# Patient Record
Sex: FEMALE | Race: WHITE | NOT HISPANIC OR LATINO | Employment: OTHER | URBAN - METROPOLITAN AREA
[De-identification: names, ages, dates, MRNs, and addresses within clinical notes are randomized per-mention and may not be internally consistent; named-entity substitution may affect disease eponyms.]

---

## 2018-01-01 ENCOUNTER — APPOINTMENT (INPATIENT)
Dept: NON INVASIVE DIAGNOSTICS | Facility: HOSPITAL | Age: 83
DRG: 371 | End: 2018-01-01
Payer: MEDICARE

## 2018-01-01 ENCOUNTER — APPOINTMENT (EMERGENCY)
Dept: CT IMAGING | Facility: HOSPITAL | Age: 83
DRG: 371 | End: 2018-01-01
Payer: MEDICARE

## 2018-01-01 ENCOUNTER — HOSPITAL ENCOUNTER (INPATIENT)
Facility: HOSPITAL | Age: 83
LOS: 36 days | Discharge: HOME | DRG: 091 | End: 2019-01-17
Attending: FAMILY MEDICINE | Admitting: FAMILY MEDICINE
Payer: MEDICARE

## 2018-01-01 ENCOUNTER — HOSPITAL ENCOUNTER (INPATIENT)
Facility: HOSPITAL | Age: 83
LOS: 3 days | Discharge: NURSING, REHAB OR LONG-TERM CARE | DRG: 371 | End: 2018-12-12
Attending: EMERGENCY MEDICINE | Admitting: FAMILY MEDICINE
Payer: MEDICARE

## 2018-01-01 ENCOUNTER — APPOINTMENT (INPATIENT)
Dept: RADIOLOGY | Facility: HOSPITAL | Age: 83
DRG: 371 | End: 2018-01-01
Payer: MEDICARE

## 2018-01-01 VITALS
DIASTOLIC BLOOD PRESSURE: 83 MMHG | HEIGHT: 62 IN | SYSTOLIC BLOOD PRESSURE: 148 MMHG | RESPIRATION RATE: 16 BRPM | OXYGEN SATURATION: 98 % | HEART RATE: 65 BPM | WEIGHT: 122.6 LBS | BODY MASS INDEX: 22.56 KG/M2 | TEMPERATURE: 98 F

## 2018-01-01 DIAGNOSIS — K52.9 GASTROENTERITIS: Primary | ICD-10-CM

## 2018-01-01 DIAGNOSIS — R77.8 ELEVATED TROPONIN: ICD-10-CM

## 2018-01-01 DIAGNOSIS — N17.9 ACUTE KIDNEY INJURY (HCC): ICD-10-CM

## 2018-01-01 DIAGNOSIS — E86.0 DEHYDRATION: ICD-10-CM

## 2018-01-01 DIAGNOSIS — N39.0 UTI (URINARY TRACT INFECTION): ICD-10-CM

## 2018-01-01 DIAGNOSIS — J44.9 COPD (CHRONIC OBSTRUCTIVE PULMONARY DISEASE) (HCC): ICD-10-CM

## 2018-01-01 DIAGNOSIS — R10.9 ABDOMINAL PAIN: ICD-10-CM

## 2018-01-01 DIAGNOSIS — A04.72 C. DIFFICILE COLITIS: ICD-10-CM

## 2018-01-01 DIAGNOSIS — E13.9 DIABETES 1.5, MANAGED AS TYPE 2 (HCC): ICD-10-CM

## 2018-01-01 DIAGNOSIS — R19.7 ACUTE DIARRHEA: Primary | ICD-10-CM

## 2018-01-01 DIAGNOSIS — N18.9 CHRONIC KIDNEY DISEASE: ICD-10-CM

## 2018-01-01 DIAGNOSIS — R11.2 NAUSEA AND VOMITING: ICD-10-CM

## 2018-01-01 LAB
ALBUMIN SERPL BCP-MCNC: 2.7 G/DL (ref 3.5–5.7)
ALBUMIN SERPL BCP-MCNC: 3 G/DL (ref 3.5–5.7)
ALBUMIN SERPL BCP-MCNC: 3 G/DL (ref 3.5–5.7)
ALBUMIN SERPL BCP-MCNC: 3.5 G/DL (ref 3.5–5.7)
ALP SERPL-CCNC: 45 U/L (ref 55–165)
ALP SERPL-CCNC: 49 U/L (ref 55–165)
ALP SERPL-CCNC: 54 U/L (ref 55–165)
ALP SERPL-CCNC: 66 U/L (ref 55–165)
ALT SERPL W P-5'-P-CCNC: 10 U/L (ref 7–52)
ALT SERPL W P-5'-P-CCNC: 14 U/L (ref 7–52)
ALT SERPL W P-5'-P-CCNC: 7 U/L (ref 7–52)
ALT SERPL W P-5'-P-CCNC: 7 U/L (ref 7–52)
ANION GAP SERPL CALCULATED.3IONS-SCNC: 5 MMOL/L (ref 4–13)
ANION GAP SERPL CALCULATED.3IONS-SCNC: 7 MMOL/L (ref 4–13)
ANION GAP SERPL CALCULATED.3IONS-SCNC: 7 MMOL/L (ref 4–13)
ANION GAP SERPL CALCULATED.3IONS-SCNC: 8 MMOL/L (ref 4–13)
APTT PPP: 28 SECONDS (ref 26–38)
AST SERPL W P-5'-P-CCNC: 12 U/L (ref 13–39)
AST SERPL W P-5'-P-CCNC: 17 U/L (ref 13–39)
AST SERPL W P-5'-P-CCNC: 27 U/L (ref 13–39)
AST SERPL W P-5'-P-CCNC: 9 U/L (ref 13–39)
ATRIAL RATE: 71 BPM
ATRIAL RATE: 78 BPM
ATRIAL RATE: 89 BPM
BACTERIA UR CULT: ABNORMAL
BACTERIA UR CULT: ABNORMAL
BACTERIA UR QL AUTO: ABNORMAL /HPF
BASOPHILS # BLD AUTO: 0 THOUSANDS/ΜL (ref 0–0.1)
BASOPHILS # BLD AUTO: 0.1 THOUSANDS/ΜL (ref 0–0.1)
BASOPHILS NFR BLD AUTO: 0 % (ref 0–2)
BASOPHILS NFR BLD AUTO: 0 % (ref 0–2)
BASOPHILS NFR BLD AUTO: 1 % (ref 0–2)
BILIRUB SERPL-MCNC: 0.5 MG/DL (ref 0.2–1)
BILIRUB SERPL-MCNC: 0.5 MG/DL (ref 0.2–1)
BILIRUB SERPL-MCNC: 0.6 MG/DL (ref 0.2–1)
BILIRUB SERPL-MCNC: 0.8 MG/DL (ref 0.2–1)
BILIRUB UR QL STRIP: NEGATIVE
BNP SERPL-MCNC: 1145 PG/ML (ref 1–100)
BNP SERPL-MCNC: 2492 PG/ML (ref 1–100)
BUN SERPL-MCNC: 23 MG/DL (ref 7–25)
BUN SERPL-MCNC: 23 MG/DL (ref 7–25)
BUN SERPL-MCNC: 30 MG/DL (ref 7–25)
BUN SERPL-MCNC: 35 MG/DL (ref 7–25)
BUN SERPL-MCNC: 41 MG/DL (ref 7–25)
BUN SERPL-MCNC: 46 MG/DL (ref 7–25)
C DIFF TOX GENS STL QL NAA+PROBE: ABNORMAL
CALCIUM SERPL-MCNC: 8.1 MG/DL (ref 8.6–10.5)
CALCIUM SERPL-MCNC: 8.5 MG/DL (ref 8.6–10.5)
CALCIUM SERPL-MCNC: 8.5 MG/DL (ref 8.6–10.5)
CALCIUM SERPL-MCNC: 8.8 MG/DL (ref 8.6–10.5)
CALCIUM SERPL-MCNC: 8.9 MG/DL (ref 8.6–10.5)
CALCIUM SERPL-MCNC: 9.4 MG/DL (ref 8.6–10.5)
CHLORIDE SERPL-SCNC: 106 MMOL/L (ref 98–107)
CHLORIDE SERPL-SCNC: 107 MMOL/L (ref 98–107)
CHLORIDE SERPL-SCNC: 108 MMOL/L (ref 98–107)
CHLORIDE SERPL-SCNC: 108 MMOL/L (ref 98–107)
CHLORIDE SERPL-SCNC: 112 MMOL/L (ref 98–107)
CHLORIDE SERPL-SCNC: 114 MMOL/L (ref 98–107)
CLARITY UR: ABNORMAL
CO2 SERPL-SCNC: 24 MMOL/L (ref 21–31)
CO2 SERPL-SCNC: 24 MMOL/L (ref 21–31)
CO2 SERPL-SCNC: 26 MMOL/L (ref 21–31)
CO2 SERPL-SCNC: 26 MMOL/L (ref 21–31)
CO2 SERPL-SCNC: 27 MMOL/L (ref 21–31)
CO2 SERPL-SCNC: 29 MMOL/L (ref 21–31)
COLOR UR: YELLOW
CREAT SERPL-MCNC: 2.46 MG/DL (ref 0.6–1.2)
CREAT SERPL-MCNC: 2.57 MG/DL (ref 0.6–1.2)
CREAT SERPL-MCNC: 2.67 MG/DL (ref 0.6–1.2)
CREAT SERPL-MCNC: 2.73 MG/DL (ref 0.6–1.2)
CREAT SERPL-MCNC: 2.82 MG/DL (ref 0.6–1.2)
CREAT SERPL-MCNC: 3.24 MG/DL (ref 0.6–1.2)
EOSINOPHIL # BLD AUTO: 0.1 THOUSAND/ΜL (ref 0–0.61)
EOSINOPHIL # BLD AUTO: 0.3 THOUSAND/ΜL (ref 0–0.61)
EOSINOPHIL NFR BLD AUTO: 1 % (ref 0–5)
EOSINOPHIL NFR BLD AUTO: 3 % (ref 0–5)
EOSINOPHIL NFR BLD AUTO: 3 % (ref 0–5)
EOSINOPHIL NFR BLD AUTO: 4 % (ref 0–5)
EOSINOPHIL NFR BLD AUTO: 5 % (ref 0–5)
ERYTHROCYTE [DISTWIDTH] IN BLOOD BY AUTOMATED COUNT: 15.5 % (ref 11.5–14.5)
ERYTHROCYTE [DISTWIDTH] IN BLOOD BY AUTOMATED COUNT: 15.8 % (ref 11.5–14.5)
ERYTHROCYTE [DISTWIDTH] IN BLOOD BY AUTOMATED COUNT: 15.8 % (ref 11.5–14.5)
ERYTHROCYTE [DISTWIDTH] IN BLOOD BY AUTOMATED COUNT: 16 % (ref 11.5–14.5)
ERYTHROCYTE [DISTWIDTH] IN BLOOD BY AUTOMATED COUNT: 16 % (ref 11.5–14.5)
EST. AVERAGE GLUCOSE BLD GHB EST-MCNC: 128 MG/DL
GFR SERPL CREATININE-BSD FRML MDRD: 12 ML/MIN/1.73SQ M
GFR SERPL CREATININE-BSD FRML MDRD: 14 ML/MIN/1.73SQ M
GFR SERPL CREATININE-BSD FRML MDRD: 15 ML/MIN/1.73SQ M
GFR SERPL CREATININE-BSD FRML MDRD: 15 ML/MIN/1.73SQ M
GFR SERPL CREATININE-BSD FRML MDRD: 16 ML/MIN/1.73SQ M
GFR SERPL CREATININE-BSD FRML MDRD: 17 ML/MIN/1.73SQ M
GLUCOSE SERPL-MCNC: 103 MG/DL (ref 65–140)
GLUCOSE SERPL-MCNC: 106 MG/DL (ref 65–140)
GLUCOSE SERPL-MCNC: 107 MG/DL (ref 65–140)
GLUCOSE SERPL-MCNC: 110 MG/DL (ref 65–140)
GLUCOSE SERPL-MCNC: 115 MG/DL (ref 65–140)
GLUCOSE SERPL-MCNC: 117 MG/DL (ref 65–140)
GLUCOSE SERPL-MCNC: 118 MG/DL (ref 65–140)
GLUCOSE SERPL-MCNC: 133 MG/DL (ref 65–140)
GLUCOSE SERPL-MCNC: 134 MG/DL (ref 65–99)
GLUCOSE SERPL-MCNC: 137 MG/DL (ref 65–140)
GLUCOSE SERPL-MCNC: 138 MG/DL (ref 65–140)
GLUCOSE SERPL-MCNC: 138 MG/DL (ref 65–99)
GLUCOSE SERPL-MCNC: 139 MG/DL (ref 65–140)
GLUCOSE SERPL-MCNC: 143 MG/DL (ref 65–140)
GLUCOSE SERPL-MCNC: 144 MG/DL (ref 65–140)
GLUCOSE SERPL-MCNC: 145 MG/DL (ref 65–99)
GLUCOSE SERPL-MCNC: 147 MG/DL (ref 65–99)
GLUCOSE SERPL-MCNC: 151 MG/DL (ref 65–140)
GLUCOSE SERPL-MCNC: 152 MG/DL (ref 65–140)
GLUCOSE SERPL-MCNC: 153 MG/DL (ref 65–140)
GLUCOSE SERPL-MCNC: 155 MG/DL (ref 65–99)
GLUCOSE SERPL-MCNC: 158 MG/DL (ref 65–140)
GLUCOSE SERPL-MCNC: 159 MG/DL (ref 65–140)
GLUCOSE SERPL-MCNC: 167 MG/DL (ref 65–140)
GLUCOSE SERPL-MCNC: 169 MG/DL (ref 65–140)
GLUCOSE SERPL-MCNC: 170 MG/DL (ref 65–140)
GLUCOSE SERPL-MCNC: 170 MG/DL (ref 65–140)
GLUCOSE SERPL-MCNC: 175 MG/DL (ref 65–140)
GLUCOSE SERPL-MCNC: 180 MG/DL (ref 65–140)
GLUCOSE SERPL-MCNC: 190 MG/DL (ref 65–140)
GLUCOSE SERPL-MCNC: 200 MG/DL (ref 65–140)
GLUCOSE SERPL-MCNC: 201 MG/DL (ref 65–140)
GLUCOSE SERPL-MCNC: 202 MG/DL (ref 65–140)
GLUCOSE SERPL-MCNC: 219 MG/DL (ref 65–140)
GLUCOSE SERPL-MCNC: 236 MG/DL (ref 65–140)
GLUCOSE SERPL-MCNC: 249 MG/DL (ref 65–140)
GLUCOSE SERPL-MCNC: 299 MG/DL (ref 65–140)
GLUCOSE SERPL-MCNC: 47 MG/DL (ref 65–140)
GLUCOSE SERPL-MCNC: 60 MG/DL (ref 65–140)
GLUCOSE SERPL-MCNC: 63 MG/DL (ref 65–140)
GLUCOSE SERPL-MCNC: 69 MG/DL (ref 65–140)
GLUCOSE SERPL-MCNC: 70 MG/DL (ref 65–99)
GLUCOSE SERPL-MCNC: 71 MG/DL (ref 65–140)
GLUCOSE SERPL-MCNC: 89 MG/DL (ref 65–140)
GLUCOSE SERPL-MCNC: 90 MG/DL (ref 65–140)
GLUCOSE SERPL-MCNC: 96 MG/DL (ref 65–140)
GLUCOSE UR STRIP-MCNC: NEGATIVE MG/DL
HBA1C MFR BLD: 6.1 % (ref 4.2–6.3)
HCT VFR BLD AUTO: 27.3 % (ref 34.8–46.1)
HCT VFR BLD AUTO: 30.7 % (ref 34.8–46.1)
HCT VFR BLD AUTO: 30.8 % (ref 34.8–46.1)
HCT VFR BLD AUTO: 34 % (ref 34.8–46.1)
HCT VFR BLD AUTO: 34.4 % (ref 34.8–46.1)
HGB BLD-MCNC: 10 G/DL (ref 12–16)
HGB BLD-MCNC: 10 G/DL (ref 12–16)
HGB BLD-MCNC: 10.9 G/DL (ref 12–16)
HGB BLD-MCNC: 11.1 G/DL (ref 12–16)
HGB BLD-MCNC: 9 G/DL (ref 12–16)
HGB UR QL STRIP.AUTO: NEGATIVE
INR PPP: 1.08 (ref 0.9–1.5)
KETONES UR STRIP-MCNC: NEGATIVE MG/DL
LACTATE SERPL-SCNC: 1 MMOL/L (ref 0.5–2)
LEUKOCYTE ESTERASE UR QL STRIP: ABNORMAL
LIPASE SERPL-CCNC: 19 U/L (ref 11–82)
LYMPHOCYTES # BLD AUTO: 1.6 THOUSANDS/ΜL (ref 0.6–4.47)
LYMPHOCYTES # BLD AUTO: 1.7 THOUSANDS/ΜL (ref 0.6–4.47)
LYMPHOCYTES # BLD AUTO: 2.2 THOUSANDS/ΜL (ref 0.6–4.47)
LYMPHOCYTES # BLD AUTO: 2.2 THOUSANDS/ΜL (ref 0.6–4.47)
LYMPHOCYTES # BLD AUTO: 3.3 THOUSANDS/ΜL (ref 0.6–4.47)
LYMPHOCYTES NFR BLD AUTO: 17 % (ref 21–51)
LYMPHOCYTES NFR BLD AUTO: 23 % (ref 21–51)
LYMPHOCYTES NFR BLD AUTO: 26 % (ref 21–51)
LYMPHOCYTES NFR BLD AUTO: 27 % (ref 21–51)
LYMPHOCYTES NFR BLD AUTO: 30 % (ref 21–51)
MAGNESIUM SERPL-MCNC: 1.6 MG/DL (ref 1.9–2.7)
MCH RBC QN AUTO: 29.1 PG (ref 26–34)
MCH RBC QN AUTO: 29.4 PG (ref 26–34)
MCH RBC QN AUTO: 29.4 PG (ref 26–34)
MCH RBC QN AUTO: 29.6 PG (ref 26–34)
MCH RBC QN AUTO: 30.1 PG (ref 26–34)
MCHC RBC AUTO-ENTMCNC: 32.1 G/DL (ref 31–37)
MCHC RBC AUTO-ENTMCNC: 32.3 G/DL (ref 31–37)
MCHC RBC AUTO-ENTMCNC: 32.6 G/DL (ref 31–37)
MCHC RBC AUTO-ENTMCNC: 32.7 G/DL (ref 31–37)
MCHC RBC AUTO-ENTMCNC: 32.9 G/DL (ref 31–37)
MCV RBC AUTO: 90 FL (ref 81–99)
MCV RBC AUTO: 90 FL (ref 81–99)
MCV RBC AUTO: 91 FL (ref 81–99)
MONOCYTES # BLD AUTO: 0.6 THOUSAND/ΜL (ref 0.17–1.22)
MONOCYTES # BLD AUTO: 0.8 THOUSAND/ΜL (ref 0.17–1.22)
MONOCYTES # BLD AUTO: 0.9 THOUSAND/ΜL (ref 0.17–1.22)
MONOCYTES # BLD AUTO: 0.9 THOUSAND/ΜL (ref 0.17–1.22)
MONOCYTES # BLD AUTO: 1.2 THOUSAND/ΜL (ref 0.17–1.22)
MONOCYTES NFR BLD AUTO: 11 % (ref 2–12)
MONOCYTES NFR BLD AUTO: 13 % (ref 2–12)
MONOCYTES NFR BLD AUTO: 9 % (ref 2–12)
NEUTROPHILS # BLD AUTO: 3.9 THOUSANDS/ΜL (ref 1.4–6.5)
NEUTROPHILS # BLD AUTO: 4.3 THOUSANDS/ΜL (ref 1.4–6.5)
NEUTROPHILS # BLD AUTO: 4.8 THOUSANDS/ΜL (ref 1.4–6.5)
NEUTROPHILS # BLD AUTO: 7.3 THOUSANDS/ΜL (ref 1.4–6.5)
NEUTROPHILS # BLD AUTO: 8.3 THOUSANDS/ΜL (ref 1.4–6.5)
NEUTS SEG NFR BLD AUTO: 53 % (ref 42–75)
NEUTS SEG NFR BLD AUTO: 59 % (ref 42–75)
NEUTS SEG NFR BLD AUTO: 63 % (ref 42–75)
NEUTS SEG NFR BLD AUTO: 64 % (ref 42–75)
NEUTS SEG NFR BLD AUTO: 71 % (ref 42–75)
NITRITE UR QL STRIP: NEGATIVE
NON-SQ EPI CELLS URNS QL MICRO: ABNORMAL /HPF
NRBC BLD AUTO-RTO: 0 /100 WBCS
P AXIS: 105 DEGREES
P AXIS: 2 DEGREES
P AXIS: 52 DEGREES
PH UR STRIP.AUTO: 6 [PH] (ref 5–8)
PHOSPHATE SERPL-MCNC: 2 MG/DL (ref 3–5.5)
PLATELET # BLD AUTO: 158 THOUSANDS/UL (ref 149–390)
PLATELET # BLD AUTO: 168 THOUSANDS/UL (ref 149–390)
PLATELET # BLD AUTO: 187 THOUSANDS/UL (ref 149–390)
PLATELET # BLD AUTO: 209 THOUSANDS/UL (ref 149–390)
PLATELET # BLD AUTO: 216 THOUSANDS/UL (ref 149–390)
PLATELET # BLD AUTO: 222 THOUSANDS/UL (ref 149–390)
PMV BLD AUTO: 8.7 FL (ref 8.6–11.7)
PMV BLD AUTO: 8.8 FL (ref 8.6–11.7)
PMV BLD AUTO: 8.9 FL (ref 8.6–11.7)
PMV BLD AUTO: 9.1 FL (ref 8.6–11.7)
PMV BLD AUTO: 9.2 FL (ref 8.6–11.7)
POTASSIUM SERPL-SCNC: 3.1 MMOL/L (ref 3.5–5.5)
POTASSIUM SERPL-SCNC: 3.2 MMOL/L (ref 3.5–5.5)
POTASSIUM SERPL-SCNC: 3.4 MMOL/L (ref 3.5–5.5)
POTASSIUM SERPL-SCNC: 3.5 MMOL/L (ref 3.5–5.5)
POTASSIUM SERPL-SCNC: 3.5 MMOL/L (ref 3.5–5.5)
POTASSIUM SERPL-SCNC: 4 MMOL/L (ref 3.5–5.5)
PR INTERVAL: 144 MS
PR INTERVAL: 154 MS
PR INTERVAL: 154 MS
PROT SERPL-MCNC: 4.8 G/DL (ref 6.4–8.9)
PROT SERPL-MCNC: 5.3 G/DL (ref 6.4–8.9)
PROT SERPL-MCNC: 5.6 G/DL (ref 6.4–8.9)
PROT SERPL-MCNC: 6.4 G/DL (ref 6.4–8.9)
PROT UR STRIP-MCNC: ABNORMAL MG/DL
PROTHROMBIN TIME: 12.5 SECONDS (ref 10.2–13)
QRS AXIS: 186 DEGREES
QRS AXIS: 46 DEGREES
QRS AXIS: 7 DEGREES
QRSD INTERVAL: 76 MS
QRSD INTERVAL: 78 MS
QRSD INTERVAL: 84 MS
QT INTERVAL: 374 MS
QT INTERVAL: 426 MS
QT INTERVAL: 480 MS
QTC INTERVAL: 455 MS
QTC INTERVAL: 485 MS
QTC INTERVAL: 521 MS
RBC # BLD AUTO: 2.99 MILLION/UL (ref 3.9–5.2)
RBC # BLD AUTO: 3.39 MILLION/UL (ref 3.9–5.2)
RBC # BLD AUTO: 3.42 MILLION/UL (ref 3.9–5.2)
RBC # BLD AUTO: 3.76 MILLION/UL (ref 3.9–5.2)
RBC # BLD AUTO: 3.78 MILLION/UL (ref 3.9–5.2)
RBC #/AREA URNS AUTO: ABNORMAL /HPF
SODIUM SERPL-SCNC: 140 MMOL/L (ref 134–143)
SODIUM SERPL-SCNC: 141 MMOL/L (ref 134–143)
SODIUM SERPL-SCNC: 141 MMOL/L (ref 134–143)
SODIUM SERPL-SCNC: 142 MMOL/L (ref 134–143)
SODIUM SERPL-SCNC: 144 MMOL/L (ref 134–143)
SODIUM SERPL-SCNC: 146 MMOL/L (ref 134–143)
SP GR UR STRIP.AUTO: 1.02 (ref 1–1.03)
T WAVE AXIS: 2 DEGREES
T WAVE AXIS: 241 DEGREES
T WAVE AXIS: 264 DEGREES
TROPONIN I SERPL-MCNC: 0.07 NG/ML
TROPONIN I SERPL-MCNC: 0.08 NG/ML
TROPONIN I SERPL-MCNC: 0.09 NG/ML
UROBILINOGEN UR QL STRIP.AUTO: 0.2 E.U./DL
VENTRICULAR RATE: 71 BPM
VENTRICULAR RATE: 78 BPM
VENTRICULAR RATE: 89 BPM
WBC # BLD AUTO: 10.2 THOUSAND/UL (ref 4.8–10.8)
WBC # BLD AUTO: 12.9 THOUSAND/UL (ref 4.8–10.8)
WBC # BLD AUTO: 6.8 THOUSAND/UL (ref 4.8–10.8)
WBC # BLD AUTO: 7.3 THOUSAND/UL (ref 4.8–10.8)
WBC # BLD AUTO: 8.1 THOUSAND/UL (ref 4.8–10.8)
WBC #/AREA URNS AUTO: ABNORMAL /HPF

## 2018-01-01 PROCEDURE — 97530 THERAPEUTIC ACTIVITIES: CPT

## 2018-01-01 PROCEDURE — 82948 REAGENT STRIP/BLOOD GLUCOSE: CPT

## 2018-01-01 PROCEDURE — 84100 ASSAY OF PHOSPHORUS: CPT | Performed by: PHYSICIAN ASSISTANT

## 2018-01-01 PROCEDURE — 85025 COMPLETE CBC W/AUTO DIFF WBC: CPT | Performed by: PHYSICIAN ASSISTANT

## 2018-01-01 PROCEDURE — 83605 ASSAY OF LACTIC ACID: CPT | Performed by: EMERGENCY MEDICINE

## 2018-01-01 PROCEDURE — 97167 OT EVAL HIGH COMPLEX 60 MIN: CPT

## 2018-01-01 PROCEDURE — 84484 ASSAY OF TROPONIN QUANT: CPT | Performed by: FAMILY MEDICINE

## 2018-01-01 PROCEDURE — 80048 BASIC METABOLIC PNL TOTAL CA: CPT | Performed by: INTERNAL MEDICINE

## 2018-01-01 PROCEDURE — G8979 MOBILITY GOAL STATUS: HCPCS

## 2018-01-01 PROCEDURE — 85049 AUTOMATED PLATELET COUNT: CPT | Performed by: PHYSICIAN ASSISTANT

## 2018-01-01 PROCEDURE — 85025 COMPLETE CBC W/AUTO DIFF WBC: CPT | Performed by: EMERGENCY MEDICINE

## 2018-01-01 PROCEDURE — 83735 ASSAY OF MAGNESIUM: CPT | Performed by: PHYSICIAN ASSISTANT

## 2018-01-01 PROCEDURE — 93010 ELECTROCARDIOGRAM REPORT: CPT | Performed by: INTERNAL MEDICINE

## 2018-01-01 PROCEDURE — 83880 ASSAY OF NATRIURETIC PEPTIDE: CPT | Performed by: PHYSICIAN ASSISTANT

## 2018-01-01 PROCEDURE — 85610 PROTHROMBIN TIME: CPT | Performed by: EMERGENCY MEDICINE

## 2018-01-01 PROCEDURE — 96375 TX/PRO/DX INJ NEW DRUG ADDON: CPT

## 2018-01-01 PROCEDURE — 1124F ACP DISCUSS-NO DSCNMKR DOCD: CPT | Performed by: INTERNAL MEDICINE

## 2018-01-01 PROCEDURE — G8978 MOBILITY CURRENT STATUS: HCPCS

## 2018-01-01 PROCEDURE — 83880 ASSAY OF NATRIURETIC PEPTIDE: CPT | Performed by: FAMILY MEDICINE

## 2018-01-01 PROCEDURE — 93005 ELECTROCARDIOGRAM TRACING: CPT

## 2018-01-01 PROCEDURE — 96361 HYDRATE IV INFUSION ADD-ON: CPT

## 2018-01-01 PROCEDURE — 94760 N-INVAS EAR/PLS OXIMETRY 1: CPT

## 2018-01-01 PROCEDURE — 87077 CULTURE AEROBIC IDENTIFY: CPT | Performed by: EMERGENCY MEDICINE

## 2018-01-01 PROCEDURE — 99285 EMERGENCY DEPT VISIT HI MDM: CPT

## 2018-01-01 PROCEDURE — 99254 IP/OBS CNSLTJ NEW/EST MOD 60: CPT | Performed by: PHYSICIAN ASSISTANT

## 2018-01-01 PROCEDURE — 83036 HEMOGLOBIN GLYCOSYLATED A1C: CPT | Performed by: PHYSICIAN ASSISTANT

## 2018-01-01 PROCEDURE — 96374 THER/PROPH/DIAG INJ IV PUSH: CPT

## 2018-01-01 PROCEDURE — 99233 SBSQ HOSP IP/OBS HIGH 50: CPT | Performed by: INTERNAL MEDICINE

## 2018-01-01 PROCEDURE — 80053 COMPREHEN METABOLIC PANEL: CPT | Performed by: EMERGENCY MEDICINE

## 2018-01-01 PROCEDURE — 80053 COMPREHEN METABOLIC PANEL: CPT | Performed by: PHYSICIAN ASSISTANT

## 2018-01-01 PROCEDURE — 87186 SC STD MICRODIL/AGAR DIL: CPT | Performed by: EMERGENCY MEDICINE

## 2018-01-01 PROCEDURE — G8987 SELF CARE CURRENT STATUS: HCPCS

## 2018-01-01 PROCEDURE — 71045 X-RAY EXAM CHEST 1 VIEW: CPT

## 2018-01-01 PROCEDURE — G8988 SELF CARE GOAL STATUS: HCPCS

## 2018-01-01 PROCEDURE — 87493 C DIFF AMPLIFIED PROBE: CPT | Performed by: EMERGENCY MEDICINE

## 2018-01-01 PROCEDURE — 87086 URINE CULTURE/COLONY COUNT: CPT | Performed by: EMERGENCY MEDICINE

## 2018-01-01 PROCEDURE — 81001 URINALYSIS AUTO W/SCOPE: CPT | Performed by: EMERGENCY MEDICINE

## 2018-01-01 PROCEDURE — 85025 COMPLETE CBC W/AUTO DIFF WBC: CPT | Performed by: FAMILY MEDICINE

## 2018-01-01 PROCEDURE — 83690 ASSAY OF LIPASE: CPT | Performed by: EMERGENCY MEDICINE

## 2018-01-01 PROCEDURE — 85730 THROMBOPLASTIN TIME PARTIAL: CPT | Performed by: EMERGENCY MEDICINE

## 2018-01-01 PROCEDURE — 94664 DEMO&/EVAL PT USE INHALER: CPT

## 2018-01-01 PROCEDURE — 74176 CT ABD & PELVIS W/O CONTRAST: CPT

## 2018-01-01 PROCEDURE — 36415 COLL VENOUS BLD VENIPUNCTURE: CPT | Performed by: EMERGENCY MEDICINE

## 2018-01-01 PROCEDURE — 97163 PT EVAL HIGH COMPLEX 45 MIN: CPT

## 2018-01-01 PROCEDURE — 80048 BASIC METABOLIC PNL TOTAL CA: CPT | Performed by: PHYSICIAN ASSISTANT

## 2018-01-01 RX ORDER — FERROUS SULFATE 325(65) MG
325 TABLET ORAL
Status: DISCONTINUED | OUTPATIENT
Start: 2018-01-01 | End: 2018-01-01 | Stop reason: HOSPADM

## 2018-01-01 RX ORDER — DOCUSATE SODIUM 100 MG/1
100 CAPSULE, LIQUID FILLED ORAL 2 TIMES DAILY PRN
COMMUNITY

## 2018-01-01 RX ORDER — FAMOTIDINE 20 MG/1
20 TABLET, FILM COATED ORAL DAILY
COMMUNITY
End: 2018-01-01 | Stop reason: HOSPADM

## 2018-01-01 RX ORDER — LACTOBACILLUS ACIDOPHILUS / LACTOBACILLUS BULGARICUS 100 MILLION CFU STRENGTH
1 GRANULES ORAL
Status: DISCONTINUED | OUTPATIENT
Start: 2018-01-01 | End: 2018-01-01 | Stop reason: HOSPADM

## 2018-01-01 RX ORDER — ATORVASTATIN CALCIUM 10 MG/1
10 TABLET, FILM COATED ORAL DAILY
COMMUNITY

## 2018-01-01 RX ORDER — PANTOPRAZOLE SODIUM 40 MG/1
40 TABLET, DELAYED RELEASE ORAL DAILY
COMMUNITY

## 2018-01-01 RX ORDER — SODIUM CHLORIDE 450 MG/100ML
100 INJECTION, SOLUTION INTRAVENOUS CONTINUOUS
Status: DISCONTINUED | OUTPATIENT
Start: 2018-01-01 | End: 2018-01-01

## 2018-01-01 RX ORDER — IPRATROPIUM BROMIDE AND ALBUTEROL SULFATE 2.5; .5 MG/3ML; MG/3ML
SOLUTION RESPIRATORY (INHALATION)
Status: DISPENSED
Start: 2018-01-01 | End: 2018-01-01

## 2018-01-01 RX ORDER — IPRATROPIUM BROMIDE AND ALBUTEROL SULFATE 2.5; .5 MG/3ML; MG/3ML
3 SOLUTION RESPIRATORY (INHALATION) 4 TIMES DAILY PRN
Status: DISCONTINUED | OUTPATIENT
Start: 2018-01-01 | End: 2018-01-01 | Stop reason: HOSPADM

## 2018-01-01 RX ORDER — LANOLIN ALCOHOL/MO/W.PET/CERES
1000 CREAM (GRAM) TOPICAL DAILY
COMMUNITY

## 2018-01-01 RX ORDER — LACTOBACILLUS ACIDOPHILUS / LACTOBACILLUS BULGARICUS 100 MILLION CFU STRENGTH
1 GRANULES ORAL
Qty: 30 PACKET | Refills: 0 | Status: ON HOLD | OUTPATIENT
Start: 2018-01-01 | End: 2019-01-01

## 2018-01-01 RX ORDER — PHENOL 1.4 %
600 AEROSOL, SPRAY (ML) MUCOUS MEMBRANE DAILY
COMMUNITY

## 2018-01-01 RX ORDER — FAMOTIDINE 20 MG/1
20 TABLET, FILM COATED ORAL DAILY
Status: DISCONTINUED | OUTPATIENT
Start: 2018-01-01 | End: 2018-01-01 | Stop reason: HOSPADM

## 2018-01-01 RX ORDER — ONDANSETRON 2 MG/ML
4 INJECTION INTRAMUSCULAR; INTRAVENOUS EVERY 6 HOURS PRN
Qty: 30 ML | Refills: 0 | Status: SHIPPED | OUTPATIENT
Start: 2018-01-01 | End: 2019-01-01

## 2018-01-01 RX ORDER — CIPROFLOXACIN 2 MG/ML
400 INJECTION, SOLUTION INTRAVENOUS EVERY 24 HOURS
Status: DISCONTINUED | OUTPATIENT
Start: 2018-01-01 | End: 2018-01-01

## 2018-01-01 RX ORDER — CEPHALEXIN 500 MG/1
500 CAPSULE ORAL EVERY 12 HOURS SCHEDULED
Status: DISCONTINUED | OUTPATIENT
Start: 2018-01-01 | End: 2018-01-01 | Stop reason: HOSPADM

## 2018-01-01 RX ORDER — FUROSEMIDE 10 MG/ML
40 INJECTION INTRAMUSCULAR; INTRAVENOUS ONCE
Status: COMPLETED | OUTPATIENT
Start: 2018-01-01 | End: 2018-01-01

## 2018-01-01 RX ORDER — FERROUS SULFATE 325(65) MG
325 TABLET ORAL
COMMUNITY
End: 2019-01-01 | Stop reason: HOSPADM

## 2018-01-01 RX ORDER — HEPARIN SODIUM 5000 [USP'U]/ML
5000 INJECTION, SOLUTION INTRAVENOUS; SUBCUTANEOUS EVERY 8 HOURS SCHEDULED
Status: DISCONTINUED | OUTPATIENT
Start: 2018-01-01 | End: 2018-01-01 | Stop reason: HOSPADM

## 2018-01-01 RX ORDER — IPRATROPIUM BROMIDE AND ALBUTEROL SULFATE 2.5; .5 MG/3ML; MG/3ML
3 SOLUTION RESPIRATORY (INHALATION) 4 TIMES DAILY PRN
Qty: 60 VIAL | Refills: 3 | Status: SHIPPED | OUTPATIENT
Start: 2018-01-01 | End: 2019-01-01

## 2018-01-01 RX ORDER — POLYVINYL ALCOHOL 14 MG/ML
1 SOLUTION/ DROPS OPHTHALMIC
Status: DISCONTINUED | OUTPATIENT
Start: 2018-01-01 | End: 2018-01-01 | Stop reason: HOSPADM

## 2018-01-01 RX ORDER — CHOLECALCIFEROL (VITAMIN D3) 125 MCG
1000 CAPSULE ORAL DAILY
Status: DISCONTINUED | OUTPATIENT
Start: 2018-01-01 | End: 2018-01-01 | Stop reason: HOSPADM

## 2018-01-01 RX ORDER — CIPROFLOXACIN 2 MG/ML
400 INJECTION, SOLUTION INTRAVENOUS ONCE
Status: COMPLETED | OUTPATIENT
Start: 2018-01-01 | End: 2018-01-01

## 2018-01-01 RX ORDER — HYDRALAZINE HYDROCHLORIDE 25 MG/1
25 TABLET, FILM COATED ORAL 2 TIMES DAILY
COMMUNITY

## 2018-01-01 RX ORDER — POLYETHYLENE GLYCOL 3350 17 G/17G
17 POWDER, FOR SOLUTION ORAL DAILY
COMMUNITY

## 2018-01-01 RX ORDER — ONDANSETRON 4 MG/1
4 TABLET, FILM COATED ORAL EVERY 6 HOURS PRN
COMMUNITY

## 2018-01-01 RX ORDER — ONDANSETRON 2 MG/ML
4 INJECTION INTRAMUSCULAR; INTRAVENOUS EVERY 6 HOURS PRN
Status: DISCONTINUED | OUTPATIENT
Start: 2018-01-01 | End: 2018-01-01 | Stop reason: HOSPADM

## 2018-01-01 RX ORDER — CALCIUM CARBONATE 500(1250)
1 TABLET ORAL
Status: DISCONTINUED | OUTPATIENT
Start: 2018-01-01 | End: 2018-01-01 | Stop reason: HOSPADM

## 2018-01-01 RX ORDER — ISOSORBIDE DINITRATE 10 MG/1
10 TABLET ORAL 3 TIMES DAILY
COMMUNITY

## 2018-01-01 RX ORDER — CEPHALEXIN 500 MG/1
500 CAPSULE ORAL EVERY 12 HOURS SCHEDULED
Qty: 8 CAPSULE | Refills: 0 | Status: SHIPPED | OUTPATIENT
Start: 2018-01-01 | End: 2018-01-01

## 2018-01-01 RX ORDER — SODIUM CHLORIDE 9 MG/ML
125 INJECTION, SOLUTION INTRAVENOUS CONTINUOUS
Status: DISCONTINUED | OUTPATIENT
Start: 2018-01-01 | End: 2018-01-01

## 2018-01-01 RX ORDER — HYDRALAZINE HYDROCHLORIDE 25 MG/1
25 TABLET, FILM COATED ORAL 2 TIMES DAILY
Status: DISCONTINUED | OUTPATIENT
Start: 2018-01-01 | End: 2018-01-01 | Stop reason: HOSPADM

## 2018-01-01 RX ORDER — ACETAMINOPHEN 325 MG/1
650 TABLET ORAL EVERY 6 HOURS PRN
Status: DISCONTINUED | OUTPATIENT
Start: 2018-01-01 | End: 2018-01-01 | Stop reason: HOSPADM

## 2018-01-01 RX ORDER — POTASSIUM CHLORIDE 20 MEQ/1
20 TABLET, EXTENDED RELEASE ORAL 2 TIMES DAILY
Status: COMPLETED | OUTPATIENT
Start: 2018-01-01 | End: 2018-01-01

## 2018-01-01 RX ORDER — ONDANSETRON 2 MG/ML
4 INJECTION INTRAMUSCULAR; INTRAVENOUS ONCE
Status: COMPLETED | OUTPATIENT
Start: 2018-01-01 | End: 2018-01-01

## 2018-01-01 RX ORDER — LANOLIN ALCOHOL/MO/W.PET/CERES
400 CREAM (GRAM) TOPICAL DAILY
Status: DISCONTINUED | OUTPATIENT
Start: 2018-01-01 | End: 2018-01-01 | Stop reason: HOSPADM

## 2018-01-01 RX ORDER — ISOSORBIDE DINITRATE 10 MG/1
10 TABLET ORAL 3 TIMES DAILY
Status: DISCONTINUED | OUTPATIENT
Start: 2018-01-01 | End: 2018-01-01 | Stop reason: HOSPADM

## 2018-01-01 RX ORDER — SENNOSIDES 8.6 MG
650 CAPSULE ORAL EVERY 6 HOURS PRN
COMMUNITY

## 2018-01-01 RX ADMIN — METRONIDAZOLE 500 MG: 500 INJECTION, SOLUTION INTRAVENOUS at 08:44

## 2018-01-01 RX ADMIN — METRONIDAZOLE 500 MG: 500 INJECTION, SOLUTION INTRAVENOUS at 17:16

## 2018-01-01 RX ADMIN — VANCOMYCIN HYDROCHLORIDE 250 MG: 500 INJECTION, POWDER, LYOPHILIZED, FOR SOLUTION INTRAVENOUS at 12:25

## 2018-01-01 RX ADMIN — METRONIDAZOLE 500 MG: 500 INJECTION, SOLUTION INTRAVENOUS at 09:00

## 2018-01-01 RX ADMIN — METRONIDAZOLE 500 MG: 500 INJECTION, SOLUTION INTRAVENOUS at 09:34

## 2018-01-01 RX ADMIN — ISOSORBIDE DINITRATE 10 MG: 10 TABLET ORAL at 09:47

## 2018-01-01 RX ADMIN — HEPARIN SODIUM 5000 UNITS: 5000 INJECTION INTRAVENOUS; SUBCUTANEOUS at 06:30

## 2018-01-01 RX ADMIN — IPRATROPIUM BROMIDE AND ALBUTEROL SULFATE 3 ML: 2.5; .5 SOLUTION RESPIRATORY (INHALATION) at 05:17

## 2018-01-01 RX ADMIN — INSULIN LISPRO 2 UNITS: 100 INJECTION, SOLUTION INTRAVENOUS; SUBCUTANEOUS at 11:46

## 2018-01-01 RX ADMIN — CALCIUM 1 TABLET: 500 TABLET ORAL at 07:27

## 2018-01-01 RX ADMIN — METRONIDAZOLE 500 MG: 500 INJECTION, SOLUTION INTRAVENOUS at 00:16

## 2018-01-01 RX ADMIN — METRONIDAZOLE 500 MG: 500 INJECTION, SOLUTION INTRAVENOUS at 16:45

## 2018-01-01 RX ADMIN — HYDRALAZINE HYDROCHLORIDE 25 MG: 25 TABLET ORAL at 09:47

## 2018-01-01 RX ADMIN — LACTOBACILLUS ACIDOPHILUS / LACTOBACILLUS BULGARICUS 1 PACKET: 100 MILLION CFU STRENGTH GRANULES at 08:59

## 2018-01-01 RX ADMIN — METOPROLOL TARTRATE 25 MG: 25 TABLET, FILM COATED ORAL at 22:29

## 2018-01-01 RX ADMIN — VANCOMYCIN HYDROCHLORIDE 250 MG: 500 INJECTION, POWDER, LYOPHILIZED, FOR SOLUTION INTRAVENOUS at 05:18

## 2018-01-01 RX ADMIN — Medication 400 MG: at 09:00

## 2018-01-01 RX ADMIN — CYANOCOBALAMIN TAB 500 MCG 1000 MCG: 500 TAB at 09:39

## 2018-01-01 RX ADMIN — ISOSORBIDE DINITRATE 10 MG: 10 TABLET ORAL at 16:18

## 2018-01-01 RX ADMIN — SITAGLIPTIN 25 MG: 25 TABLET, FILM COATED ORAL at 09:47

## 2018-01-01 RX ADMIN — CIPROFLOXACIN 400 MG: 2 INJECTION, SOLUTION INTRAVENOUS at 12:25

## 2018-01-01 RX ADMIN — FUROSEMIDE 40 MG: 10 INJECTION, SOLUTION INTRAVENOUS at 11:41

## 2018-01-01 RX ADMIN — VANCOMYCIN HYDROCHLORIDE 125 MG: 500 INJECTION, POWDER, LYOPHILIZED, FOR SOLUTION INTRAVENOUS at 13:48

## 2018-01-01 RX ADMIN — FAMOTIDINE 20 MG: 20 TABLET ORAL at 08:42

## 2018-01-01 RX ADMIN — SITAGLIPTIN 25 MG: 25 TABLET, FILM COATED ORAL at 09:39

## 2018-01-01 RX ADMIN — METRONIDAZOLE 500 MG: 500 INJECTION, SOLUTION INTRAVENOUS at 00:33

## 2018-01-01 RX ADMIN — HEPARIN SODIUM 5000 UNITS: 5000 INJECTION INTRAVENOUS; SUBCUTANEOUS at 22:05

## 2018-01-01 RX ADMIN — Medication 400 MG: at 09:47

## 2018-01-01 RX ADMIN — METOPROLOL TARTRATE 25 MG: 25 TABLET, FILM COATED ORAL at 21:50

## 2018-01-01 RX ADMIN — METOPROLOL TARTRATE 25 MG: 25 TABLET, FILM COATED ORAL at 21:33

## 2018-01-01 RX ADMIN — VANCOMYCIN HYDROCHLORIDE 250 MG: 500 INJECTION, POWDER, LYOPHILIZED, FOR SOLUTION INTRAVENOUS at 11:41

## 2018-01-01 RX ADMIN — INSULIN LISPRO 1 UNITS: 100 INJECTION, SOLUTION INTRAVENOUS; SUBCUTANEOUS at 12:24

## 2018-01-01 RX ADMIN — ISOSORBIDE DINITRATE 10 MG: 10 TABLET ORAL at 20:40

## 2018-01-01 RX ADMIN — ISOSORBIDE DINITRATE 10 MG: 10 TABLET ORAL at 16:44

## 2018-01-01 RX ADMIN — HEPARIN SODIUM 5000 UNITS: 5000 INJECTION INTRAVENOUS; SUBCUTANEOUS at 05:49

## 2018-01-01 RX ADMIN — SODIUM CHLORIDE 125 ML/HR: 9 INJECTION, SOLUTION INTRAVENOUS at 12:47

## 2018-01-01 RX ADMIN — HEPARIN SODIUM 5000 UNITS: 5000 INJECTION INTRAVENOUS; SUBCUTANEOUS at 22:30

## 2018-01-01 RX ADMIN — INSULIN DETEMIR 4 UNITS: 100 INJECTION, SOLUTION SUBCUTANEOUS at 21:30

## 2018-01-01 RX ADMIN — Medication 400 MG: at 09:39

## 2018-01-01 RX ADMIN — INSULIN LISPRO 1 UNITS: 100 INJECTION, SOLUTION INTRAVENOUS; SUBCUTANEOUS at 16:16

## 2018-01-01 RX ADMIN — INSULIN DETEMIR 4 UNITS: 100 INJECTION, SOLUTION SUBCUTANEOUS at 21:49

## 2018-01-01 RX ADMIN — METRONIDAZOLE 500 MG: 500 INJECTION, SOLUTION INTRAVENOUS at 18:09

## 2018-01-01 RX ADMIN — INSULIN DETEMIR 4 UNITS: 100 INJECTION, SOLUTION SUBCUTANEOUS at 22:04

## 2018-01-01 RX ADMIN — SODIUM CHLORIDE 100 ML/HR: 4.5 INJECTION, SOLUTION INTRAVENOUS at 17:17

## 2018-01-01 RX ADMIN — HEPARIN SODIUM 5000 UNITS: 5000 INJECTION INTRAVENOUS; SUBCUTANEOUS at 05:18

## 2018-01-01 RX ADMIN — METRONIDAZOLE 500 MG: 500 INJECTION, SOLUTION INTRAVENOUS at 17:57

## 2018-01-01 RX ADMIN — HEPARIN SODIUM 5000 UNITS: 5000 INJECTION INTRAVENOUS; SUBCUTANEOUS at 15:02

## 2018-01-01 RX ADMIN — ISOSORBIDE DINITRATE 10 MG: 10 TABLET ORAL at 17:16

## 2018-01-01 RX ADMIN — ISOSORBIDE DINITRATE 10 MG: 10 TABLET ORAL at 17:57

## 2018-01-01 RX ADMIN — LACTOBACILLUS ACIDOPHILUS / LACTOBACILLUS BULGARICUS 1 PACKET: 100 MILLION CFU STRENGTH GRANULES at 17:57

## 2018-01-01 RX ADMIN — METOPROLOL TARTRATE 25 MG: 25 TABLET, FILM COATED ORAL at 08:59

## 2018-01-01 RX ADMIN — Medication 400 MG: at 08:43

## 2018-01-01 RX ADMIN — VANCOMYCIN HYDROCHLORIDE 125 MG: 500 INJECTION, POWDER, LYOPHILIZED, FOR SOLUTION INTRAVENOUS at 17:18

## 2018-01-01 RX ADMIN — HYDRALAZINE HYDROCHLORIDE 25 MG: 25 TABLET ORAL at 17:18

## 2018-01-01 RX ADMIN — ISOSORBIDE DINITRATE 10 MG: 10 TABLET ORAL at 21:49

## 2018-01-01 RX ADMIN — HYDRALAZINE HYDROCHLORIDE 25 MG: 25 TABLET ORAL at 08:43

## 2018-01-01 RX ADMIN — VANCOMYCIN HYDROCHLORIDE 250 MG: 500 INJECTION, POWDER, LYOPHILIZED, FOR SOLUTION INTRAVENOUS at 17:59

## 2018-01-01 RX ADMIN — METRONIDAZOLE 500 MG: 500 INJECTION, SOLUTION INTRAVENOUS at 10:08

## 2018-01-01 RX ADMIN — CIPROFLOXACIN 400 MG: 2 INJECTION, SOLUTION INTRAVENOUS at 12:10

## 2018-01-01 RX ADMIN — INSULIN DETEMIR 4 UNITS: 100 INJECTION, SOLUTION SUBCUTANEOUS at 22:30

## 2018-01-01 RX ADMIN — HYDRALAZINE HYDROCHLORIDE 25 MG: 25 TABLET ORAL at 08:59

## 2018-01-01 RX ADMIN — HEPARIN SODIUM 5000 UNITS: 5000 INJECTION INTRAVENOUS; SUBCUTANEOUS at 05:17

## 2018-01-01 RX ADMIN — HEPARIN SODIUM 5000 UNITS: 5000 INJECTION INTRAVENOUS; SUBCUTANEOUS at 21:30

## 2018-01-01 RX ADMIN — INSULIN LISPRO 1 UNITS: 100 INJECTION, SOLUTION INTRAVENOUS; SUBCUTANEOUS at 10:10

## 2018-01-01 RX ADMIN — MORPHINE SULFATE 2 MG: 2 INJECTION, SOLUTION INTRAMUSCULAR; INTRAVENOUS at 09:27

## 2018-01-01 RX ADMIN — ISOSORBIDE DINITRATE 10 MG: 10 TABLET ORAL at 08:59

## 2018-01-01 RX ADMIN — CIPROFLOXACIN 400 MG: 2 INJECTION, SOLUTION INTRAVENOUS at 12:11

## 2018-01-01 RX ADMIN — HEPARIN SODIUM 5000 UNITS: 5000 INJECTION INTRAVENOUS; SUBCUTANEOUS at 21:49

## 2018-01-01 RX ADMIN — VANCOMYCIN HYDROCHLORIDE 250 MG: 500 INJECTION, POWDER, LYOPHILIZED, FOR SOLUTION INTRAVENOUS at 00:32

## 2018-01-01 RX ADMIN — CYANOCOBALAMIN TAB 500 MCG 1000 MCG: 500 TAB at 08:59

## 2018-01-01 RX ADMIN — CALCIUM 1 TABLET: 500 TABLET ORAL at 08:59

## 2018-01-01 RX ADMIN — SODIUM CHLORIDE 100 ML/HR: 4.5 INJECTION, SOLUTION INTRAVENOUS at 19:01

## 2018-01-01 RX ADMIN — HEPARIN SODIUM 5000 UNITS: 5000 INJECTION INTRAVENOUS; SUBCUTANEOUS at 14:43

## 2018-01-01 RX ADMIN — INSULIN LISPRO 1 UNITS: 100 INJECTION, SOLUTION INTRAVENOUS; SUBCUTANEOUS at 16:43

## 2018-01-01 RX ADMIN — FUROSEMIDE 40 MG: 10 INJECTION, SOLUTION INTRAVENOUS at 05:38

## 2018-01-01 RX ADMIN — VANCOMYCIN HYDROCHLORIDE 250 MG: 500 INJECTION, POWDER, LYOPHILIZED, FOR SOLUTION INTRAVENOUS at 05:49

## 2018-01-01 RX ADMIN — SITAGLIPTIN 25 MG: 25 TABLET, FILM COATED ORAL at 08:59

## 2018-01-01 RX ADMIN — VANCOMYCIN HYDROCHLORIDE 125 MG: 500 INJECTION, POWDER, LYOPHILIZED, FOR SOLUTION INTRAVENOUS at 05:21

## 2018-01-01 RX ADMIN — ISOSORBIDE DINITRATE 10 MG: 10 TABLET ORAL at 09:39

## 2018-01-01 RX ADMIN — HEPARIN SODIUM 5000 UNITS: 5000 INJECTION INTRAVENOUS; SUBCUTANEOUS at 21:39

## 2018-01-01 RX ADMIN — CALCIUM 1 TABLET: 500 TABLET ORAL at 09:39

## 2018-01-01 RX ADMIN — CYANOCOBALAMIN TAB 500 MCG 1000 MCG: 500 TAB at 09:47

## 2018-01-01 RX ADMIN — FERROUS SULFATE TAB 325 MG (65 MG ELEMENTAL FE) 325 MG: 325 (65 FE) TAB at 08:59

## 2018-01-01 RX ADMIN — CYANOCOBALAMIN TAB 500 MCG 1000 MCG: 500 TAB at 08:42

## 2018-01-01 RX ADMIN — METOPROLOL TARTRATE 25 MG: 25 TABLET, FILM COATED ORAL at 20:40

## 2018-01-01 RX ADMIN — FAMOTIDINE 20 MG: 20 TABLET ORAL at 08:59

## 2018-01-01 RX ADMIN — CIPROFLOXACIN 400 MG: 2 INJECTION, SOLUTION INTRAVENOUS at 11:50

## 2018-01-01 RX ADMIN — HYDRALAZINE HYDROCHLORIDE 25 MG: 25 TABLET ORAL at 17:57

## 2018-01-01 RX ADMIN — INSULIN DETEMIR 4 UNITS: 100 INJECTION, SOLUTION SUBCUTANEOUS at 21:39

## 2018-01-01 RX ADMIN — INSULIN LISPRO 1 UNITS: 100 INJECTION, SOLUTION INTRAVENOUS; SUBCUTANEOUS at 07:27

## 2018-01-01 RX ADMIN — LACTOBACILLUS ACIDOPHILUS / LACTOBACILLUS BULGARICUS 1 PACKET: 100 MILLION CFU STRENGTH GRANULES at 16:17

## 2018-01-01 RX ADMIN — ISOSORBIDE DINITRATE 10 MG: 10 TABLET ORAL at 21:39

## 2018-01-01 RX ADMIN — VANCOMYCIN HYDROCHLORIDE 250 MG: 500 INJECTION, POWDER, LYOPHILIZED, FOR SOLUTION INTRAVENOUS at 00:08

## 2018-01-01 RX ADMIN — POTASSIUM CHLORIDE 20 MEQ: 1500 TABLET, EXTENDED RELEASE ORAL at 17:18

## 2018-01-01 RX ADMIN — HYDRALAZINE HYDROCHLORIDE 25 MG: 25 TABLET ORAL at 18:09

## 2018-01-01 RX ADMIN — POTASSIUM CHLORIDE 20 MEQ: 1500 TABLET, EXTENDED RELEASE ORAL at 14:02

## 2018-01-01 RX ADMIN — INSULIN LISPRO 2 UNITS: 100 INJECTION, SOLUTION INTRAVENOUS; SUBCUTANEOUS at 12:25

## 2018-01-01 RX ADMIN — FERROUS SULFATE TAB 325 MG (65 MG ELEMENTAL FE) 325 MG: 325 (65 FE) TAB at 07:27

## 2018-01-01 RX ADMIN — FERROUS SULFATE TAB 325 MG (65 MG ELEMENTAL FE) 325 MG: 325 (65 FE) TAB at 09:39

## 2018-01-01 RX ADMIN — LACTOBACILLUS ACIDOPHILUS / LACTOBACILLUS BULGARICUS 1 PACKET: 100 MILLION CFU STRENGTH GRANULES at 12:11

## 2018-01-01 RX ADMIN — VANCOMYCIN HYDROCHLORIDE 250 MG: 500 INJECTION, POWDER, LYOPHILIZED, FOR SOLUTION INTRAVENOUS at 18:16

## 2018-01-01 RX ADMIN — CALCIUM 1 TABLET: 500 TABLET ORAL at 08:42

## 2018-01-01 RX ADMIN — SODIUM CHLORIDE 125 ML/HR: 9 INJECTION, SOLUTION INTRAVENOUS at 09:45

## 2018-01-01 RX ADMIN — METRONIDAZOLE 500 MG: 500 INJECTION, SOLUTION INTRAVENOUS at 00:47

## 2018-01-01 RX ADMIN — ONDANSETRON 4 MG: 2 INJECTION INTRAMUSCULAR; INTRAVENOUS at 12:18

## 2018-01-01 RX ADMIN — CIPROFLOXACIN 400 MG: 2 INJECTION, SOLUTION INTRAVENOUS at 11:41

## 2018-01-01 RX ADMIN — VANCOMYCIN HYDROCHLORIDE 250 MG: 500 INJECTION, POWDER, LYOPHILIZED, FOR SOLUTION INTRAVENOUS at 06:30

## 2018-01-01 RX ADMIN — METOPROLOL TARTRATE 25 MG: 25 TABLET, FILM COATED ORAL at 09:47

## 2018-01-01 RX ADMIN — INSULIN LISPRO 1 UNITS: 100 INJECTION, SOLUTION INTRAVENOUS; SUBCUTANEOUS at 11:49

## 2018-01-01 RX ADMIN — ISOSORBIDE DINITRATE 10 MG: 10 TABLET ORAL at 08:43

## 2018-01-01 RX ADMIN — VANCOMYCIN HYDROCHLORIDE 250 MG: 500 INJECTION, POWDER, LYOPHILIZED, FOR SOLUTION INTRAVENOUS at 18:32

## 2018-01-01 RX ADMIN — METRONIDAZOLE 500 MG: 500 INJECTION, SOLUTION INTRAVENOUS at 00:58

## 2018-01-01 RX ADMIN — HEPARIN SODIUM 5000 UNITS: 5000 INJECTION INTRAVENOUS; SUBCUTANEOUS at 14:02

## 2018-01-01 RX ADMIN — METOPROLOL TARTRATE 25 MG: 25 TABLET, FILM COATED ORAL at 09:39

## 2018-01-01 RX ADMIN — SODIUM CHLORIDE 1000 ML: 0.9 INJECTION, SOLUTION INTRAVENOUS at 09:23

## 2018-01-01 RX ADMIN — METOPROLOL TARTRATE 25 MG: 25 TABLET, FILM COATED ORAL at 08:43

## 2018-01-01 RX ADMIN — VANCOMYCIN HYDROCHLORIDE 250 MG: 500 INJECTION, POWDER, LYOPHILIZED, FOR SOLUTION INTRAVENOUS at 23:33

## 2018-01-01 RX ADMIN — SODIUM CHLORIDE 125 ML/HR: 9 INJECTION, SOLUTION INTRAVENOUS at 00:57

## 2018-01-01 RX ADMIN — SITAGLIPTIN 25 MG: 25 TABLET, FILM COATED ORAL at 08:44

## 2018-01-01 RX ADMIN — CIPROFLOXACIN 400 MG: 2 INJECTION, SOLUTION INTRAVENOUS at 12:30

## 2018-01-01 RX ADMIN — HEPARIN SODIUM 5000 UNITS: 5000 INJECTION INTRAVENOUS; SUBCUTANEOUS at 05:11

## 2018-01-01 RX ADMIN — FAMOTIDINE 20 MG: 20 TABLET ORAL at 09:47

## 2018-01-01 RX ADMIN — HYDRALAZINE HYDROCHLORIDE 25 MG: 25 TABLET ORAL at 09:39

## 2018-01-01 RX ADMIN — FAMOTIDINE 20 MG: 20 TABLET ORAL at 09:39

## 2018-01-01 RX ADMIN — HYDRALAZINE HYDROCHLORIDE 25 MG: 25 TABLET ORAL at 17:17

## 2018-01-01 RX ADMIN — ISOSORBIDE DINITRATE 10 MG: 10 TABLET ORAL at 20:44

## 2018-01-01 RX ADMIN — ONDANSETRON 4 MG: 2 INJECTION INTRAMUSCULAR; INTRAVENOUS at 09:25

## 2018-01-01 RX ADMIN — ISOSORBIDE DINITRATE 10 MG: 10 TABLET ORAL at 22:12

## 2018-01-01 RX ADMIN — METOPROLOL TARTRATE 25 MG: 25 TABLET, FILM COATED ORAL at 20:44

## 2018-01-01 RX ADMIN — HEPARIN SODIUM 5000 UNITS: 5000 INJECTION INTRAVENOUS; SUBCUTANEOUS at 13:49

## 2018-01-01 RX ADMIN — FERROUS SULFATE TAB 325 MG (65 MG ELEMENTAL FE) 325 MG: 325 (65 FE) TAB at 08:42

## 2018-01-01 RX ADMIN — SODIUM CHLORIDE 100 ML/HR: 4.5 INJECTION, SOLUTION INTRAVENOUS at 05:00

## 2018-01-01 RX ADMIN — ONDANSETRON 4 MG: 2 INJECTION INTRAMUSCULAR; INTRAVENOUS at 20:44

## 2018-05-10 LAB
BACTERIA UR QL AUTO: ABNORMAL
BILIRUB UR QL STRIP: NEGATIVE
CLARITY UR: ABNORMAL
COLOR UR: YELLOW
GLUCOSE UR STRIP-MCNC: ABNORMAL MG/DL
HGB UR QL STRIP.AUTO: NEGATIVE
KETONES UR STRIP-MCNC: NEGATIVE MG/DL
LEUKOCYTE ESTERASE UR QL STRIP: NEGATIVE
MUCUS THREADS (HISTORICAL): PRESENT /HPF
NITRITE UR QL STRIP: NEGATIVE
NON-SQ EPI CELLS URNS QL MICRO: ABNORMAL /HPF
PH UR STRIP.AUTO: 5.5 [PH] (ref 4.5–8)
PROT UR STRIP-MCNC: ABNORMAL MG/DL
RBC #/AREA URNS AUTO: ABNORMAL /HPF
SP GR UR STRIP.AUTO: 1.02 (ref 1–1.03)
UROBILINOGEN UR QL STRIP.AUTO: 0.2 EU/DL (ref 0.2–8)
WBC #/AREA URNS AUTO: ABNORMAL /HPF

## 2018-05-30 LAB
BILIRUB UR QL STRIP: NEGATIVE
CLARITY UR: ABNORMAL
COLOR UR: YELLOW
GLUCOSE UR STRIP-MCNC: NEGATIVE MG/DL
HGB UR QL STRIP.AUTO: NEGATIVE
KETONES UR STRIP-MCNC: NEGATIVE MG/DL
LEUKOCYTE ESTERASE UR QL STRIP: NEGATIVE
NITRITE UR QL STRIP: NEGATIVE
PH UR STRIP.AUTO: 6.5 [PH] (ref 4.5–8)
PROT UR STRIP-MCNC: ABNORMAL MG/DL
SP GR UR STRIP.AUTO: 1.02 (ref 1–1.03)
UROBILINOGEN UR QL STRIP.AUTO: 0.2 EU/DL (ref 0.2–8)

## 2018-07-26 ENCOUNTER — LAB REQUISITION (OUTPATIENT)
Dept: LAB | Facility: HOSPITAL | Age: 83
End: 2018-07-26
Payer: MEDICARE

## 2018-07-26 DIAGNOSIS — N39.0 URINARY TRACT INFECTION: ICD-10-CM

## 2018-07-26 LAB
BACTERIA UR QL AUTO: ABNORMAL /HPF
BILIRUB UR QL STRIP: NEGATIVE
CLARITY UR: ABNORMAL
COLOR UR: YELLOW
GLUCOSE UR STRIP-MCNC: NEGATIVE MG/DL
HGB UR QL STRIP.AUTO: NEGATIVE
KETONES UR STRIP-MCNC: ABNORMAL MG/DL
LEUKOCYTE ESTERASE UR QL STRIP: ABNORMAL
NITRITE UR QL STRIP: NEGATIVE
NON-SQ EPI CELLS URNS QL MICRO: ABNORMAL /HPF
OTHER STN SPEC: ABNORMAL
PH UR STRIP.AUTO: 6 [PH] (ref 5–8)
PROT UR STRIP-MCNC: ABNORMAL MG/DL
RBC #/AREA URNS AUTO: ABNORMAL /HPF
SP GR UR STRIP.AUTO: 1.02 (ref 1–1.03)
UROBILINOGEN UR QL STRIP.AUTO: 0.2 E.U./DL
WBC #/AREA URNS AUTO: ABNORMAL /HPF

## 2018-07-26 PROCEDURE — 81001 URINALYSIS AUTO W/SCOPE: CPT | Performed by: NURSE PRACTITIONER

## 2018-07-26 PROCEDURE — 87186 SC STD MICRODIL/AGAR DIL: CPT | Performed by: NURSE PRACTITIONER

## 2018-07-26 PROCEDURE — 87077 CULTURE AEROBIC IDENTIFY: CPT | Performed by: NURSE PRACTITIONER

## 2018-07-26 PROCEDURE — 87086 URINE CULTURE/COLONY COUNT: CPT | Performed by: NURSE PRACTITIONER

## 2018-07-29 LAB
BACTERIA UR CULT: ABNORMAL
BACTERIA UR CULT: ABNORMAL

## 2018-12-07 PROBLEM — N17.9 ACUTE KIDNEY INJURY (HCC): Status: ACTIVE | Noted: 2018-01-01

## 2018-12-07 PROBLEM — R19.7 ACUTE DIARRHEA: Status: ACTIVE | Noted: 2018-01-01

## 2018-12-07 PROBLEM — K52.9 GASTROENTERITIS: Status: ACTIVE | Noted: 2018-01-01

## 2018-12-07 PROBLEM — R11.2 NAUSEA & VOMITING: Status: ACTIVE | Noted: 2018-01-01

## 2018-12-07 NOTE — ED PROVIDER NOTES
History  Chief Complaint   Patient presents with    Abdominal Pain     x2 days    Nausea    Vomiting    Diarrhea     Patient is a 80-year-old female resident of a local personal care home who presents the emergency department today with 2 days of symptoms of nausea vomiting and diarrhea with worsening vomiting and abdominal pain that started this morning  Pains or generalized and associated with several episodes of vomiting today  History provided by:  Patient, nursing home and EMS personnel  Abdominal Pain   Pain location:  Generalized  Pain quality: aching and cramping    Pain radiates to:  Does not radiate  Pain severity:  Moderate  Onset quality:  Gradual  Duration:  2 days  Timing:  Constant  Progression:  Worsening  Chronicity:  New  Associated symptoms: diarrhea, nausea and vomiting    Associated symptoms: no chest pain, no chills, no constipation, no cough, no dysuria, no fatigue, no fever, no hematuria, no shortness of breath and no sore throat        Prior to Admission Medications   Prescriptions Last Dose Informant Patient Reported? Taking?    Linagliptin (TRADJENTA) 5 MG TABS 12/6/2018 at Unknown time  Yes Yes   Sig: Take 5 mg by mouth daily   Magnesium Chloride (MAG64 PO) 12/6/2018 at Unknown time  Yes Yes   Sig: Take 1 tablet by mouth daily   Polyvinyl Alcohol-Povidone (ARTIFICIAL TEARS) 5-6 MG/ML SOLN 12/6/2018 at Unknown time  Yes Yes   Sig: Apply 1 drop to eye 4 (four) times a day   acetaminophen (TYLENOL) 650 mg CR tablet Unknown at Unknown time  Yes No   Sig: Take 650 mg by mouth every 6 (six) hours as needed for mild pain   atorvastatin (LIPITOR) 10 mg tablet 12/6/2018 at Unknown time  Yes Yes   Sig: Take 10 mg by mouth daily   calcium carbonate (CALCIUM 600) 600 MG tablet 12/6/2018 at famotidin  Yes Yes   Sig: Take 600 mg by mouth daily   cyanocobalamin (VITAMIN B-12) 1,000 mcg tablet 12/6/2018 at Unknown time  Yes Yes   Sig: Take 1,000 mcg by mouth daily   docusate sodium (COLACE) 100 mg capsule Unknown at Unknown time  Yes No   Sig: Take 100 mg by mouth 2 (two) times a day as needed for constipation   famotidine (PEPCID) 20 mg tablet 12/6/2018 at Unknown time  Yes Yes   Sig: Take 20 mg by mouth daily   ferrous sulfate 325 (65 Fe) mg tablet 12/6/2018 at Unknown time  Yes Yes   Sig: Take 325 mg by mouth daily with breakfast   hydrALAZINE (APRESOLINE) 25 mg tablet 12/6/2018 at Unknown time  Yes Yes   Sig: Take 25 mg by mouth 2 (two) times a day   insulin detemir (LEVEMIR) 100 units/mL subcutaneous injection 12/6/2018 at Unknown time  Yes Yes   Sig: Inject under the skin daily at bedtime   isosorbide dinitrate (ISORDIL) 10 mg tablet 12/6/2018 at Unknown time  Yes Yes   Sig: Take 10 mg by mouth 3 (three) times a day   metoprolol tartrate (LOPRESSOR) 25 mg tablet 12/6/2018 at Unknown time  Yes Yes   Sig: Take 25 mg by mouth every 12 (twelve) hours   ondansetron (ZOFRAN) 4 mg tablet 12/3/2018  Yes Yes   Sig: Take 4 mg by mouth every 6 (six) hours as needed for nausea or vomiting   pantoprazole (PROTONIX) 40 mg tablet 12/6/2018 at Unknown time  Yes Yes   Sig: Take 40 mg by mouth daily   polyethylene glycol (MIRALAX) 17 g packet 12/6/2018 at Unknown time  Yes Yes   Sig: Take 17 g by mouth daily      Facility-Administered Medications: None       Past Medical History:   Diagnosis Date    Constipation     Diabetes mellitus (HCC)     GERD (gastroesophageal reflux disease)     Hypercholesterolemia     Hypertension     Iron deficiency anemia     Vitamin B12 deficiency        History reviewed  No pertinent surgical history  History reviewed  No pertinent family history  I have reviewed and agree with the history as documented  Social History   Substance Use Topics    Smoking status: Unknown If Ever Smoked    Smokeless tobacco: Never Used    Alcohol use No        Review of Systems   Constitutional: Negative for activity change, appetite change, chills, fatigue and fever     HENT: Negative for congestion, ear pain, rhinorrhea and sore throat  Eyes: Negative for discharge, redness and visual disturbance  Respiratory: Negative for cough, chest tightness, shortness of breath and wheezing  Cardiovascular: Negative for chest pain and palpitations  Gastrointestinal: Positive for abdominal pain, diarrhea, nausea and vomiting  Negative for constipation  Endocrine: Negative for polydipsia and polyuria  Genitourinary: Negative for difficulty urinating, dysuria, frequency, hematuria and urgency  Musculoskeletal: Negative for arthralgias and myalgias  Skin: Negative for color change, pallor and rash  Neurological: Negative for dizziness, weakness, light-headedness, numbness and headaches  Hematological: Negative for adenopathy  Does not bruise/bleed easily  All other systems reviewed and are negative  Physical Exam  Physical Exam   Constitutional: She is oriented to person, place, and time  She appears well-developed and well-nourished  HENT:   Head: Normocephalic and atraumatic  Right Ear: External ear normal    Left Ear: External ear normal    Nose: Nose normal    Mouth/Throat: Oropharynx is clear and moist    Eyes: Pupils are equal, round, and reactive to light  Conjunctivae and EOM are normal    Neck: Normal range of motion  Neck supple  Cardiovascular: Normal rate, regular rhythm, normal heart sounds and intact distal pulses  Pulmonary/Chest: Effort normal and breath sounds normal  No respiratory distress  She has no wheezes  She has no rales  She exhibits no tenderness  Abdominal: Soft  Bowel sounds are normal  She exhibits no distension  There is generalized tenderness  There is no guarding  Musculoskeletal: Normal range of motion  Neurological: She is alert and oriented to person, place, and time  No cranial nerve deficit or sensory deficit  Skin: Skin is warm and dry  Psychiatric: She has a normal mood and affect     Nursing note and vitals reviewed  Vital Signs  ED Triage Vitals [12/07/18 0900]   Temperature Pulse Respirations Blood Pressure SpO2   (!) 97 2 °F (36 2 °C) 80 20 (!) 188/77 98 %      Temp Source Heart Rate Source Patient Position - Orthostatic VS BP Location FiO2 (%)   Temporal Monitor Lying Left arm --      Pain Score       --           Vitals:    12/07/18 0900 12/07/18 1059   BP: (!) 188/77 (!) 207/79   Pulse: 80 80   Patient Position - Orthostatic VS: Lying Lying       Visual Acuity      ED Medications  Medications   ciprofloxacin (CIPRO) IVPB (premix) 400 mg (not administered)   metroNIDAZOLE (FLAGYL) IVPB (premix) 500 mg (not administered)   sodium chloride 0 9 % infusion (not administered)   sodium chloride 0 9 % bolus 1,000 mL (1,000 mL Intravenous New Bag 12/7/18 0923)   ondansetron (ZOFRAN) injection 4 mg (4 mg Intravenous Given 12/7/18 0925)   morphine injection 2 mg (2 mg Intravenous Given 12/7/18 3442)       Diagnostic Studies  Results Reviewed     Procedure Component Value Units Date/Time    Stool Enteric Bacterial Panel by PCR [[id number]]     Lab Status:  No result Specimen:  Stool from Rectum     Clostridium difficile toxin by PCR [[id number]]     Lab Status:  No result Specimen:  Stool from Rectum     UA w Reflex to Microscopic w Reflex to Culture [[id number]]  (Abnormal) Collected:  12/07/18 1111    Lab Status:  Final result Specimen:  Urine from Urine, Straight Cath Updated:  12/07/18 1120     Color, UA Yellow     Clarity, UA Slightly Cloudy (A)     Specific Gravity, UA 1 020     pH, UA 6 0     Leukocytes, UA Trace (A)     Nitrite, UA Negative     Protein, UA 2+ (A) mg/dl      Glucose, UA Negative mg/dl      Ketones, UA Negative mg/dl      Urobilinogen, UA 0 2 E U /dl      Bilirubin, UA Negative     Blood, UA Negative    Urine Microscopic [[id number]] Collected:  12/07/18 1111    Lab Status:   In process Specimen:  Urine from Urine, Straight Cath Updated:  12/07/18 1119    Protime-INR [[id number]]  (Normal) Collected: 12/07/18 0915    Lab Status:  Final result Specimen:  Blood from Arm, Left Updated:  12/07/18 0950     Protime 12 5 seconds      INR 1 08    APTT [[id number]]  (Normal) Collected:  12/07/18 0915    Lab Status:  Final result Specimen:  Blood from Arm, Left Updated:  12/07/18 0950     PTT 28 seconds     Lactic acid, plasma [[id number]]  (Normal) Collected:  12/07/18 0915    Lab Status:  Final result Specimen:  Blood from Arm, Left Updated:  12/07/18 0949     LACTIC ACID 1 0 mmol/L     Narrative:         Result may be elevated if tourniquet was used during collection  Lipase [[id number]]  (Normal) Collected:  12/07/18 0915    Lab Status:  Final result Specimen:  Blood from Arm, Left Updated:  12/07/18 0949     Lipase 19 u/L     Comprehensive metabolic panel [[id number]]  (Abnormal) Collected:  12/07/18 0915    Lab Status:  Final result Specimen:  Blood from Arm, Left Updated:  12/07/18 0949     Sodium 144 (H) mmol/L      Potassium 4 0 mmol/L      Chloride 107 mmol/L      CO2 29 mmol/L      ANION GAP 8 mmol/L      BUN 46 (H) mg/dL      Creatinine 3 24 (H) mg/dL      Glucose 145 (H) mg/dL      Calcium 9 4 mg/dL      AST 17 U/L      ALT 10 U/L      Alkaline Phosphatase 66 U/L      Total Protein 6 4 g/dL      Albumin 3 5 g/dL      Total Bilirubin 0 60 mg/dL      eGFR 12 ml/min/1 73sq m     Narrative:         National Kidney Disease Education Program recommendations are as follows:  GFR calculation is accurate only with a steady state creatinine  Chronic Kidney disease less than 60 ml/min/1 73 sq  meters  Kidney failure less than 15 ml/min/1 73 sq  meters      CBC and differential [[id number]]  (Abnormal) Collected:  12/07/18 0915    Lab Status:  Final result Specimen:  Blood from Arm, Left Updated:  12/07/18 0931     WBC 10 20 Thousand/uL      RBC 3 78 (L) Million/uL      Hemoglobin 11 1 (L) g/dL      Hematocrit 34 4 (L) %      MCV 91 fL      MCH 29 4 pg      MCHC 32 3 g/dL      RDW 15 5 (H) %      MPV 8 9 fL      Platelets 187 Thousands/uL      nRBC 0 /100 WBCs      Neutrophils Relative 71 %      Lymphocytes Relative 17 (L) %      Monocytes Relative 9 %      Eosinophils Relative 3 %      Basophils Relative 0 %      Neutrophils Absolute 7 30 (H) Thousands/µL      Lymphocytes Absolute 1 70 Thousands/µL      Monocytes Absolute 0 90 Thousand/µL      Eosinophils Absolute 0 30 Thousand/µL      Basophils Absolute 0 00 Thousands/µL                  CT abdomen pelvis wo contrast   Final Result by Kendra Blizzard, MD (12/07 1113)   Liquid appearing colonic stool  No additional potentially acute noncontrast finding              Signed by Kendra Blizzard, MD                 Procedures  ECG 12 Lead Documentation  Date/Time: 12/7/2018 9:37 AM  Performed by: Primitivo Flores by: Minnie Flores     ECG reviewed by me, the ED Provider: yes    Patient location:  ED  Previous ECG:     Comparison to cardiac monitor: Yes    Rate:     ECG rate:  78    ECG rate assessment: normal    Rhythm:     Rhythm: sinus rhythm    Ectopy:     Ectopy: none    QRS:     QRS axis:  Left  ST segments:     ST segments:  Non-specific  T waves:     T waves: non-specific               Phone Contacts  ED Phone Contact    ED Course        11:25 AM  spoke with Dr Viry Hernandez hospitalists patient's primary physician reviewed case and findings in the emergency department he agrees with care management at this point and will accept for observation and further evaluation and treatment in the hospital                           MDM  Number of Diagnoses or Management Options  Abdominal pain: new and requires workup  Acute kidney injury [facility]): new and requires workup  Chronic kidney disease: new and requires workup  Dehydration: new and requires workup  Gastroenteritis: new and requires workup  Nausea and vomiting: new and requires workup     Amount and/or Complexity of Data Reviewed  Clinical lab tests: ordered and reviewed  Tests in the radiology section of CPT®: ordered and reviewed  Tests in the medicine section of CPT®: ordered and reviewed  Decide to obtain previous medical records or to obtain history from someone other than the patient: yes  Review and summarize past medical records: yes  Independent visualization of images, tracings, or specimens: yes    Risk of Complications, Morbidity, and/or Mortality  Presenting problems: moderate  Management options: moderate    Patient Progress  Patient progress: stable    CritCare Time    Disposition  Final diagnoses:   Acute kidney injury ([facility] )   Chronic kidney disease   Nausea and vomiting   Abdominal pain   Dehydration   Gastroenteritis     Time reflects when diagnosis was documented in both MDM as applicable and the Disposition within this note     Time User Action Codes Description Comment    12/7/2018 11:14 AM Clearnce Distel Add [N17 9] [facility]     12/7/2018 11:14 AM Clearnce Distel Add [N18 9] Chronic kidney disease     12/7/2018 11:14 AM Meaghan Yamilex Candelaria Crisp Add [R11 2] Nausea and vomiting     12/7/2018 11:14 AM Clearnce Distel Add [R10 9] Abdominal pain     12/7/2018 11:14 AM Clearnce Distel Add [E86 0] Dehydration     12/7/2018 11:19 AM Clearnce Distel Add [K52 9] Gastroenteritis     12/7/2018 11:19 AM Meaghan BariYamilex lee Crisp Modify [N17 9] Acute kidney injury ([facility] )     12/7/2018 11:19 AM Clearnce Distel Modify [K52 9] Gastroenteritis       ED Disposition     ED Disposition Condition Comment    Admit  Case was discussed with Dr Kurt Pool and the patient's admission status was agreed to be Admission Status: observation status to the service of Dr Kurt Pool  Follow-up Information    None         Patient's Medications   Discharge Prescriptions    No medications on file     No discharge procedures on file      ED Provider  Electronically Signed by           Tammy Fuller DO  12/07/18 1681

## 2018-12-07 NOTE — H&P
Judy Olguin#  [id number]  ENF:[id number]    HRK:[id number]  Adm Date: 12/7/2018 0905  9:05 AM   ATT PHY: Patel Berryvanis, Do  [facility]         Chief Complaint: Abdominal pain, nausea, vomiting, diarrhea    History of Presenting Illness: Isidoro Luevano is a(n) 80y o  year old female  presenting originally to [facility] ER for acute generalized abdominal pain, nausea, vomiting, and diarrhea for the last 2 days  Patient states that the vomiting had become more severe today  BP was noted to be 207/79 in the ED  CT of the abdomen was largely unremarkable with the exception of liquid colonic stool  CMP revealed evidence of acute kidney injury, with creatinine of 3 24, BUN of 46, and GFR of 12  It was with these findings that the patient was deemed appropriate for Med/Surg Admission as 24 hour observation  The patient was seen after reviewing the chart and discussing the case with caring staff  Today during our encounter, the patient was unable to provide any meaningful history given profound confusion  Of note, her BP on arrival to the Med/Surg Unit had significantly improved to 151/64  No Known Allergies    No current facility-administered medications on file prior to encounter  No current outpatient prescriptions on file prior to encounter  Active Ambulatory Problems     Diagnosis Date Noted    No Active Ambulatory Problems     Resolved Ambulatory Problems     Diagnosis Date Noted    No Resolved Ambulatory Problems     Past Medical History:   Diagnosis Date    Constipation     Diabetes mellitus ([address] )     GERD (gastroesophageal reflux disease)     Hypercholesterolemia     Hypertension     Iron deficiency anemia     Vitamin B12 deficiency        History reviewed  No pertinent surgical history  Social History: Patient is a resident of a local personal care home   The patient is unable to provide any further meaningful history  Per charts, she does not drink alcohol, smoke, or use illicit drugs  Family History: Non-contributory at this stage  Review of Systems   Gastrointestinal: Positive for abdominal pain, diarrhea, nausea and vomiting  Vitals:    12/07/18 1201   BP: 151/64   Pulse: 86   Resp: 16   Temp: (!) 96 8 °F (36 °C)   SpO2: 93%       Physical Exam   Constitutional: Awake and Alert  Well-developed and well-nourished  No apparent distress  Patient appears profoundly confused  HENT:   Head: Normocephalic and atraumatic  Mouth/Throat: Unable to assess  Eyes: Conjunctivae and EOM are normal  Pupils are equal, round, and reactive to light  Right and left eye exhibits no discharge  Ears: Unable to assess  Neck: Unable to assess  Cardiovascular: RRR with normal heart sounds  Exam reveals no friction rub  +holosystolic blowing murmur heard most prominently in the aortic region  Pulmonary/Chest: Effort normal and breath sounds normal  No respiratory distress  Patient has no wheezes, rales, or rhonchi  Abdominal: Soft  Bowel sounds are normal  No distension  Difficulty assessing for tenderness, but patient appears to grimace/show discomfort in the LLQ and RLQ  There is no rebound and no guarding  Neurological: Unable to assess  Skin: Skin is warm and dry  No rash noted  No diaphoresis  No erythema  No edema  No cyanosis  Assessment     Kelsie Tena is a(n) 80y o  year old female with Acute Kidney Injury secondary to volume depletion from vomiting/diarrhea  1  Acute Kidney Injury  Patient has received a one time bolus of normosaline 1000cc  Additional order was placed for normosaline infusion at 125cc/hr  We will consult Dr Michael Anderson for further evaluation and management  Will continue to trend CMP  2  Acute Abdominal Pain, Nausea, Vomiting, and Diarrhea  Stool C  Diff and Bacteria Panels by PCR are pending   Patient has received empiric Cipro 400mg IV and Flagyl 500mg IV x1 in the ER, which will be continued pending the above labs  Continue IV hydration as mentioned above, and Zofran 4mg IV as needed for nausea/vomiting  Will continue to monitor vitals and CBC for evidence of acute/worsening infection  Patient has a history of constipation for which her Colace will be on hold  3  GERD  Continue Pepcid 20mg once daily  4  Cardiac with history of Hypertension and Hyperlipidemia  Continue Hydralazine 25mg bid, Isosorbide dinitrate 10mg tid, Metoprolol tartrate 25mg bid  5  Type 2 Diabetes Mellitus  Continue Levemir 6 units in the AM and 4 units in the PM along with Tradjenta 5mg once daily with low dose  insulin sliding scale    Will check HgbA1c  Patient will be on Level 3 Carb Controlled Diet  Accu Checks ACHS  6  History of Anemia  Mild at this stage  Continue Ferrous Sulfate 325mg once daily  7  DJD/OA with low back pain  Calcium carbonate 600mg once daily along with Tylenol as needed for pain  8  Gait Disturbance  PT/OT will be on consult for this  9  Dry Eyes  Artificial Tears as needed  Prognosis: Fair  Discharge Plan: In progress  Advanced Directives: I have discussed in detail the patient the advanced directives  The patient does have a POA and does not have a living will  First contact is listed as Emelyn Vanegas, who can be reached at [phone]  I spoke with her, and she stated that she is the POA  She will attempt to bring in this paperwork for our charts  When discussing cardiac and pulmonary resuscitation efforts with the patient, the POA states that the patient has been quoted as saying "If I die, don't bring me back " Patient will be accordingly placed as a DNAR  I have spent more than 50 minutes gathering data, doing physical examination, and discussing the advanced directives, which was witnessed by caring staff  The patient was discussed with Dr Mary Beth Salvador and he is in agreement with the above note

## 2018-12-08 NOTE — CONSULTS
VTE Prophylaxis: Heparin      Recommendations for Discharge:  · Per Primary Service    Counseling / Coordination of Care Time: 45 minutes  Greater than 50% of total time spent on patient counseling and coordination of care  History of Present Illness:  Sunita Elliott is a 80 y o  female who is originally admitted to the medical service due to elevated BUN creatinine above baseline  Nephrology service is consulted for   Evidence of acute kidney injury with BUN of 46 and creatinine of 3 24  Patient presented to our emergency room after caregivers at the personal care home where she resides noted poor p o  intake with nausea vomiting and diarrhea over the last 48 hours or more prior to admission  Workup in emergency room revealed markedly elevated systolic blood pressure of greater than 200  Laboratory studies showed marked elevation of BUN and creatinine  Potassium is normal acid-base levels normal, and anion gap normal   Appropriate clip cultures of blood and urine were taken and are pending at the time of this consult  The patient was started on appropriate IV fluids  Follow-up laboratory studies reveal significant improvement in renal function and I anticipate further improvement over the course of this admission  Patient's baseline renal function is not known at this time  No further renal workup is indicated at this time unless renal function deteriorates further  Nephrology Services will continue to follow along with the medical team and make recommendations accordingly    Review of Systems:  Review of Systems   Unable to perform ROS: Dementia (Confusion)       Past Medical and Surgical History:   Past Medical History:   Diagnosis Date    Constipation     Diabetes mellitus ([facility] [address] )     DJD (degenerative joint disease)     Gait abnormality     GERD (gastroesophageal reflux disease)     History of fall     Hypercholesterolemia     Hypertension     Iron deficiency anemia     Low back pain  Vitamin B12 deficiency        History reviewed  No pertinent surgical history  Meds/Allergies:  all medications and allergies reviewed    Allergies: No Known Allergies    Social History:     Marital Status: Single    Substance Use History:   History   Alcohol Use No     History   Smoking Status    Unknown If Ever Smoked   Smokeless Tobacco    Never Used     History   Drug Use No       Family History:  I have reviewed the patients family history    Physical Exam:   Vitals:   Blood Pressure: 124/60 (12/07/18 1809)  Pulse: 71 (12/07/18 1459)  Temperature: (!) 96 7 °F (35 9 °C) (12/07/18 1459)  Temp Source: Tympanic (12/07/18 1459)  Respirations: 17 (12/07/18 1459)  Height: 5' 2" (157 5 cm) (12/07/18 1201)  Weight - Scale: 55 kg (121 lb 3 2 oz) (12/07/18 1201)  SpO2: 96 % (12/07/18 1459)    Physical Exam   Constitutional: She appears well-developed and well-nourished  HENT:   Head: Normocephalic and atraumatic  Eyes: Pupils are equal, round, and reactive to light  Conjunctivae and EOM are normal    Neck: Normal range of motion  Neck supple  No JVD present  No thyromegaly present  Cardiovascular: Regular rhythm  Murmur heard  Pulmonary/Chest: Effort normal and breath sounds normal    Abdominal: Soft  Bowel sounds are normal    Musculoskeletal: Normal range of motion  Skin: Skin is warm and dry  Additional Data:   Lab Results: I have personally reviewed pertinent reports          Results from last 7 days  Lab Units 12/07/18  1743 12/07/18  0915   WBC Thousand/uL  --  10 20   HEMOGLOBIN g/dL  --  11 1*   HEMATOCRIT %  --  34 4*   PLATELETS Thousands/uL 168 187   NEUTROS PCT %  --  71   LYMPHS PCT %  --  17*   MONOS PCT %  --  9   EOS PCT %  --  3       Results from last 7 days  Lab Units 12/07/18  1743   POTASSIUM mmol/L 3 5   CHLORIDE mmol/L 108*   CO2 mmol/L 26   BUN mg/dL 41*   CREATININE mg/dL 2 82*   CALCIUM mg/dL 8 5*   ALK PHOS U/L 54*   ALT U/L 7   AST U/L 12*       Results from last 7 days  Lab Units 12/07/18  0915   INR  1 08         No results found for: HGBA1C    Results from last 7 days  Lab Units 12/07/18  1552 12/07/18  1256   POC GLUCOSE mg/dl 117 107       Imaging: I have personally reviewed pertinent reports  CT abdomen pelvis wo contrast   Final Result by Jim Isaac MD (12/07 1113)   Liquid appearing colonic stool  No additional potentially acute noncontrast finding  Signed by Jim Isaac MD          EKG, Pathology, and Other Studies Reviewed on Admission:   · EKG:   Reviewed    ** Please Note: This note has been constructed using a voice recognition system  **    Consult- Rhododendron Mac 4/7/1927, 80 y o  female MRN: [id number]    Unit/Bed#: -01 Encounter: [id number]    Primary Care Provider: Cheryle Smoker, MD   Date and time admitted to hospital: 12/7/2018  9:05 AM      Consults    No new Assessment & Plan notes have been filed under this hospital service since the last note was generated    Service: Nephrology

## 2018-12-08 NOTE — UTILIZATION REVIEW
Initial Clinical Review    Admission: Date/Time/Statement: 12/7/18 1122    Orders Placed This Encounter   Procedures    Place in Observation (expected length of stay for this patient is less than two midnights)     Standing Status:   Standing     Number of Occurrences:   1     Order Specific Question:   Admitting Physician     Answer:   Sandra López [[id number]]     Order Specific Question:   Level of Care     Answer:   Med Surg [16]         ED: Date/Time/Mode of Arrival:   ED Arrival Information     Expected Arrival Acuity Means of Arrival Escorted By Service Admission Type    - 12/7/2018 09:02 Urgent Ambulance [address] [facility] General Medicine Urgent    Arrival Complaint    Vomiting, nausea          Chief Complaint:   Chief Complaint   Patient presents with    Abdominal Pain     x2 days    Nausea    Vomiting    Diarrhea       History of Illness: Srinivasa Payne is a(n) 80y o  year old female  presenting originally to [facility] ER for acute generalized abdominal pain, nausea, vomiting, and diarrhea for the last 2 days  Patient states that the vomiting had become more severe today  BP was noted to be 207/79 in the ED  CT of the abdomen was largely unremarkable with the exception of liquid colonic stool  CMP revealed evidence of acute kidney injury, with creatinine of 3 24, BUN of 46, and GFR of 12   It was with these findings that the patient was deemed appropriate for Med/Surg Admission as 24 hour observation           ED Vital Signs:   ED Triage Vitals   Temperature Pulse Respirations Blood Pressure SpO2   12/07/18 0900 12/07/18 0900 12/07/18 0900 12/07/18 0900 12/07/18 0900   (!) 97 2 °F (36 2 °C) 80 20 (!) 188/77 98 %      Temp Source Heart Rate Source Patient Position - Orthostatic VS BP Location FiO2 (%)   12/07/18 0900 12/07/18 0900 12/07/18 0900 12/07/18 0900 --   Temporal Monitor Lying Left arm       Pain Score       12/07/18 1201       No Pain        Wt Readings from Last 1 Encounters:   12/07/18 55 kg (121 lb 3 2 oz)       Vital Signs (abnormal): Abnormal Labs/Diagnostic Test Results:   BUN/CR 35/2 73 AST 9 ALK PHOS 45 ALB 2 7 PHOS 2 0 MG 1 6 H/H 9/27 3  STOOL POSITIVE C DIFF  CT ABDOMEN  Liquid appearing colonic stool  No additional potentially acute noncontrast finding  ED Treatment:   Medication Administration from 12/07/2018 0902 to 12/07/2018 1153       Date/Time Order Dose Route Action Action by Comments     12/07/2018 0923 sodium chloride 0 9 % bolus 1,000 mL 1,000 mL Intravenous [address] Laviniaur Luan Jean RN      12/07/2018 0925 ondansetron (ZOFRAN) injection 4 mg 4 mg Intravenous Given Renard Schaefer RN      12/07/2018 9363 morphine injection 2 mg 2 mg Intravenous Given Renard Schaefer RN           Past Medical/Surgical History: Active Ambulatory Problems     Diagnosis Date Noted    No Active Ambulatory Problems     Resolved Ambulatory Problems     Diagnosis Date Noted    No Resolved Ambulatory Problems     Past Medical History:   Diagnosis Date    Constipation     [facility]     DJD (degenerative joint disease)     Gait abnormality     GERD (gastroesophageal reflux disease)     History of fall     Hypercholesterolemia     Hypertension     Iron deficiency anemia     Low back pain     Vitamin B12 deficiency        Admitting Diagnosis: Dehydration [E86 0]  Gastroenteritis [K52 9]  Vomiting [R11 10]  Abdominal pain [R10 9]  Nausea and vomiting [R11 2]  Chronic kidney disease [N18 9]  Acute kidney injury ([facility] [address] ) [N17 9]    Age/Sex: 80 y o  female    Assessment/Plan:   Wayne Henderson is a(n) 80y o  year old female with Acute Kidney Injury secondary to volume depletion from vomiting/diarrhea  1  Acute Kidney Injury  Patient has received a one time bolus of normosaline 1000cc  Additional order was placed for normosaline infusion at 125cc/hr  We will consult Dr Hayley Vitale for further evaluation and management  Will continue to trend CMP    2  Acute Abdominal Pain, Nausea, Vomiting, and Diarrhea  Stool C  Diff and Bacteria Panels by PCR are pending  Patient has received empiric Cipro 400mg IV and Flagyl 500mg IV x1 in the ER, which will be continued pending the above labs  Continue IV hydration as mentioned above, and Zofran 4mg IV as needed for nausea/vomiting  Will continue to monitor vitals and CBC for evidence of acute/worsening infection  Patient has a history of constipation for which her Colace will be on hold  3  GERD  Continue Pepcid 20mg once daily  4  Cardiac with history of Hypertension and Hyperlipidemia  Continue Hydralazine 25mg bid, Isosorbide dinitrate 10mg tid, Metoprolol tartrate 25mg bid  5  Type 2 Diabetes Mellitus  Continue Levemir 6 units in the AM and 4 units in the PM along with Tradjenta 5mg once daily with low dose  insulin sliding scale    Will check HgbA1c  Patient will be on Level 3 Carb Controlled Diet  Accu Checks ACHS  6  History of Anemia  Mild at this stage  Continue Ferrous Sulfate 325mg once daily  7  DJD/OA with low back pain  Calcium carbonate 600mg once daily along with Tylenol as needed for pain  8  Gait Disturbance  PT/OT will be on consult for this  9  Dry Eyes   Artificial Tears as needed    Admission Orders:  OBSERVATION  CONSULT NEPHROLOGY  PT OT  STOOL C DIFF   TOOL ENTERIC BACTERIAL PANEL   Scheduled Meds:   Current Facility-Administered Medications:  acetaminophen 650 mg Oral Q6H PRN Tunde ROBERT Garrett-RAO    calcium carbonate 1 tablet Oral Daily With Breakfast ROBERT Tuttle-RAO    ciprofloxacin 400 mg Intravenous Q24H Marino Atkins PA-C    cyanocobalamin 1,000 mcg Oral Daily [place] ROBERT Garrett-RAO    famotidine 20 mg Oral Daily [place] ROBERT Garrett-RAO    ferrous sulfate 325 mg Oral Daily With Breakfast Marino Atkins PA-C    glucagon 1 mg Intravenous PRN Tunde Reapariela Atkins, PA-RAO    glucose 16 g Oral PRN [place] ROBERT Garrett-RAO    heparin (porcine) 5,000 Units Subcutaneous Q8H [address] ROBERT Tuttle-RAO    hydrALAZINE 25 mg Oral BID Corby Atkins PA-C    insulin detemir 4 Units Subcutaneous HS Corby Atkins PA-C    insulin lispro 1-5 Units Subcutaneous TID AC Marino Atkins PA-C    isosorbide dinitrate 10 mg Oral TID Corby Atkins PA-C    magnesium Oxide 400 mg Oral Daily Corby Atkins PA-C    metoprolol tartrate 25 mg Oral Q12H [address] Marino Atkins PA-C    metroNIDAZOLE 500 mg Intravenous Once Verónica Whitaker DO    metroNIDAZOLE 500 mg Intravenous Q8H Marino Atkins PA-C Last Rate: 500 mg (12/08/18 0844)   ondansetron 4 mg Intravenous Q6H PRN Corby Atkins PA-C    polyvinyl alcohol 1 drop Both Eyes Q3H PRN Corby Atkins PA-C    sitaGLIPtin 25 mg Oral Daily Marino Atkins PA-C    sodium chloride 125 mL/hr Intravenous Continuous Shashi Beyer DO Last Rate: 125 mL/hr (12/08/18 0945)     Continuous Infusions:   sodium chloride 125 mL/hr Last Rate: 125 mL/hr (12/08/18 0945)     PRN Meds:   acetaminophen    glucagon    glucose    ondansetron    polyvinyl alcohol

## 2018-12-08 NOTE — SOCIAL WORK
CM met with pt at bedside and explained role  CM also spoke with pt daughter Cathi Becerra via phone due to pt confusion due to history of Dementia  Holly was made aware of observation status for pt and notice regarding same was mailed to her as she lives 90 minutes away  Pt is a resident of [facility]  Pt uses walker to ambulate at the facility  Plan at this time is for pt to return there upon discharge  CM awaiting PT/OT consults for recommendations and if pt able to return to [facility]  Pt has had [facility] for PT/OT in past per daughter and has refused same again  Pt adamant she does not want HHC or rehab on discharge and wants to return to [facility]  Per [facility] [place] [facility] if pt discharged tomorrow they will be able to provide transport  CM will continue tot follow  CM reviewed d/c planning process including the following: identifying help at home, patient preference for d/c planning needs, availability of treatment team to discuss questions or concerns patient and/or family may have regarding understanding medications and recognizing signs and symptoms once discharged  CM also encouraged patient to follow up with all recommended appointments after discharge  Patient advised of importance for patient and family to participate in managing patients medical well being

## 2018-12-08 NOTE — PLAN OF CARE
Problem: DISCHARGE PLANNING - CARE MANAGEMENT  Goal: Discharge to post-acute care or home with appropriate resources  INTERVENTIONS:  - Conduct assessment to determine patient/family and health care team treatment goals, and need for post-acute services based on payer coverage, community resources, and patient preferences, and barriers to discharge  - Address psychosocial, clinical, and financial barriers to discharge as identified in assessment in conjunction with the patient/family and health care team  - Arrange appropriate level of post-acute services according to patient's   needs and preference and payer coverage in collaboration with the physician and health care team  - Communicate with and update the patient/family, physician, and health care team regarding progress on the discharge plan  - Arrange appropriate transportation to post-acute venues  - Patient goal is to return to [facility] upon discharge   Outcome: Progressing

## 2018-12-08 NOTE — NURSING NOTE
Pt awake and alert to self, pleasant and cooperative  Tender abdomen, last bm earlier today, reported to have mucous present  Denied nauseau at beginning of shift, then reported that she did feel sick   zofran given per order  Currently pt is resting comfortably, denies pain, no s/s of distress  Call bell is within reach

## 2018-12-09 NOTE — PHYSICIAN ADVISOR
Current patient class: Observation  The patient is currently on Hospital Day: 3 at [address]      The patient was admitted to the hospital at N/A on N/A for the following diagnosis:  Dehydration [E86 0]  Gastroenteritis [K52 9]  Vomiting [R11 10]  Abdominal pain [R10 9]  Nausea and vomiting [R11 2]  Chronic kidney disease [N18 9]  Acute kidney injury ([address] ) [N17 9]       There is documentation in the medical record of an expected length of stay of at least 2 midnights  The patient is therefore expected to satisfy the 2 midnight benchmark and given the 2 midnight presumption is appropriate for INPATIENT ADMISSION  Given this expectation of a satisfying stay, CMS instructs us that the patient is most often appropriate for inpatient admission under part A provided medical necessity is documented in the chart  After review of the relevant documentation, labs, vital signs and test results, the patient is appropriate for INPATIENT ADMISSION  Admission to the hospital as an inpatient is a complex decision making process which requires the practitioner to consider the patients presenting complaint, history and physical examination and all relevant testing  With this in mind, in this case, the patient was deemed appropriate for INPATIENT ADMISSION  After review of the documentation and testing available at the time of the admission I concur with this clinical determination of medical necessity  Rationale is as follows: The patient is a 80 yrs old Female who presented to the ED at 12/7/2018  9:05 AM with a chief complaint of Abdominal Pain (x2 days); Nausea; Vomiting; and Diarrhea     Given the need for further hospitalization, and along with the documentation of medical necessity present in the chart, the patient is appropriate for inpatient admission  The patient is expected to satisfy the 2 midnight benchmark, and will require further acute medical care   The patient does have comorbid conditions which increases the risk for significant adverse outcome  Given this the patient is appropriate for inpatient admission  The patients vitals on arrival were ED Triage Vitals   Temperature Pulse Respirations Blood Pressure SpO2   12/07/18 0900 12/07/18 0900 12/07/18 0900 12/07/18 0900 12/07/18 0900   (!) 97 2 °F (36 2 °C) 80 20 (!) 188/77 98 %      Temp Source Heart Rate Source Patient Position - Orthostatic VS BP Location FiO2 (%)   12/07/18 0900 12/07/18 0900 12/07/18 0900 12/07/18 0900 --   Temporal Monitor Lying Left arm       Pain Score       12/07/18 1201       No Pain           Past Medical History:   Diagnosis Date    Constipation     Diabetes mellitus (HCC)     DJD (degenerative joint disease)     Gait abnormality     GERD (gastroesophageal reflux disease)     History of fall     Hypercholesterolemia     Hypertension     Iron deficiency anemia     Low back pain     Vitamin B12 deficiency      History reviewed  No pertinent surgical history  Consults have been placed to:   IP CONSULT TO NEPHROLOGY    Vitals:    12/08/18 0754 12/08/18 1500 12/08/18 2150 12/08/18 2303   BP: 150/63 146/60 132/62 130/66   BP Location: Left arm Left arm  Left arm   Pulse: 79 74 72 83   Resp: 18 18  16   Temp: 98 5 °F (36 9 °C) 98 2 °F (36 8 °C)  98 4 °F (36 9 °C)   TempSrc: Tympanic Tympanic  Tympanic   SpO2: 98% 96%  97%   Weight:       Height:           Most recent labs:    Recent Labs      12/07/18   0915   12/08/18   0538   WBC  10 20   --   6 80   HGB  11 1*   --   9 0*   HCT  34 4*   --   27 3*   PLT  187   < >  158   K  4 0   < >  3 5   CALCIUM  9 4   < >  8 1*   BUN  46*   < >  35*   CREATININE  3 24*   < >  2 73*   LIPASE  19   --    --    INR  1 08   --    --    AST  17   < >  9*   ALT  10   < >  7   ALKPHOS  66   < >  45*    < > = values in this interval not displayed         Scheduled Meds:  Current Facility-Administered Medications:  acetaminophen 650 mg Oral Q6H PRN Tayoramesh Kitchen PA-C    calcium carbonate 1 tablet Oral Daily With Breakfast Jesus Atkins PA-C    ciprofloxacin 400 mg Intravenous Q24H Jesus Atkins PA-C Last Rate: 400 mg (12/08/18 1150)   cyanocobalamin 1,000 mcg Oral Daily Marino Atkins PA-C    famotidine 20 mg Oral Daily Jesus Atkins PA-C    ferrous sulfate 325 mg Oral Daily With Breakfast Marino Atkins PA-C    glucagon 1 mg Intravenous PRN Jesus Atkins PA-C    glucose 16 g Oral PRN Jesus Atkins PA-C    heparin (porcine) 5,000 Units Subcutaneous Q8H [facility] Marino Atkins PA-C    hydrALAZINE 25 mg Oral BID Jesus Atkins PA-C    insulin detemir 4 Units Subcutaneous HS Jesus Atkins PA-C    insulin lispro 1-5 Units Subcutaneous TID AC Marino Atkins PA-C    isosorbide dinitrate 10 mg Oral TID Jesus Atkins PA-C    magnesium Oxide 400 mg Oral Daily Jesus Atkins PA-C    metoprolol tartrate 25 mg Oral Q12H [facility] Marino Atkins PA-C    metroNIDAZOLE 500 mg Intravenous Once Saintclair Gosselin, DO    metroNIDAZOLE 500 mg Intravenous Q8H Marino Atkins PA-C Last Rate: 500 mg (12/08/18 1645)   ondansetron 4 mg Intravenous Q6H PRN Marino Atkins PA-C    polyvinyl alcohol 1 drop Both Eyes Q3H PRN Jesus Atkins PA-C    sitaGLIPtin 25 mg Oral Daily Marino Atkins PA-C    sodium chloride 100 mL/hr Intravenous Continuous Jennifer Jones MD Last Rate: 100 mL/hr (12/08/18 1717)   vancomycin 250 mg Oral Q6H [facility] Jennifer Jones MD      Continuous Infusions:  sodium chloride 100 mL/hr Last Rate: 100 mL/hr (12/08/18 1717)     PRN Meds:   acetaminophen    glucagon    glucose    ondansetron    polyvinyl alcohol    Surgical procedures (if appropriate):

## 2018-12-09 NOTE — UTILIZATION REVIEW
Continued Stay Review    PATIENT ADMITTED AS OBSERVATION STATUS 12/7/18 AND CONVERTED TO INPATIENT ADMISSION 12/9/18 FOR CONTINUED TREATMENT IV ATBX     Date: 12/9/18  Inpatient Admission (Order [id number])   Admission   Date: 12/9/2018 Department: [facility] Released By: Omkar Sebastian DO (auto-released) Authorizing: Andreea Barros MD   Order Information     Order Name   INPATIENT ADMISSION [263]   Inpatient   Date/Time Action Taken User Additional Information   12/09/18 0032 Release Dom Roth DO (auto-released) From Order: [id number]   12/09/18 0032 Complete Hilario Roth DO    Order Details     Frequency Duration Priority Order Class   Once 1  occurrence Routine Hospital Performed   Inpatient Admission   Order: [id number]   Status:  Completed   Visible to patient:  No (Not Released) Next appt:  None                             Order Questions     Question Answer Comment   Admitting Physician [facility]    Level of Care Med Surg    Estimated length of stay More than 2 Midnights    Certification I certify that inpatient services are medically necessary for this patient for a duration of greater than two midnights  See H&P and MD Progress Notes for additional information about the patient's course of treatment            Vital Signs: /60 (BP Location: Left arm)   Pulse 80   Temp 98 4 °F (36 9 °C) (Tympanic)   Resp 18   Ht 5' 2" (1 575 m)   Wt 55 kg (121 lb 3 2 oz)   SpO2 96%   BMI 22 17 kg/m²     Medications:   Scheduled Meds:   Current Facility-Administered Medications:  acetaminophen 650 mg Oral Q6H PRN Jesus Atkins PA-C    calcium carbonate 1 tablet Oral Daily With Breakfast Marino Atkins PA-C    ciprofloxacin 400 mg Intravenous Q24H Marino Atkins PA-C Last Rate: 400 mg (12/08/18 1150)   cyanocobalamin 1,000 mcg Oral Daily Marino Atkins PA-C    famotidine 20 mg Oral Daily Jesus Atkins PA-C    ferrous sulfate 325 mg Oral Daily With Breakfast Darlene Atkins PA-C    glucagon 1 mg Intravenous PRN Darlene Atkins PA-C    glucose 16 g Oral PRN Darlene Atkins PA-C    heparin (porcine) 5,000 Units Subcutaneous Q8H [address] La[address] Atkins PA-C    hydrALAZINE 25 mg Oral BID Darlene Atkins PA-C    insulin detemir 4 Units Subcutaneous HS ROBERT Aguero-RAO    insulin lispro 1-5 Units Subcutaneous TID AC ROBERT Tuttle-C    isosorbide dinitrate 10 mg Oral TID ROBERT Aguero-RAO    magnesium Oxide 400 mg Oral Daily Darlene Atkins PA-C    metoprolol tartrate 25 mg Oral Q12H [address]  Marino Atkins PA-C    metroNIDAZOLE 500 mg Intravenous Once [employer], DO    metroNIDAZOLE 500 mg Intravenous Q8H Marino Atkins PA-C Last Rate: 500 mg (12/09/18 0033)   ondansetron 4 mg Intravenous Q6H PRN ROBERT Aguero-RAO    polyvinyl alcohol 1 drop Both Eyes Q3H PRN Darlene Atkins PA-C    sitaGLIPtin 25 mg Oral Daily Darlene Atkins PA-C    sodium chloride 100 mL/hr Intravenous Continuous Colleen Castillo MD Last Rate: 100 mL/hr (12/09/18 0500)   vancomycin 250 mg Oral Q6H [address] Colleen Castillo MD      Continuous Infusions:   sodium chloride 100 mL/hr Last Rate: 100 mL/hr (12/09/18 0500)     PRN Meds:   acetaminophen    glucagon    glucose    ondansetron    polyvinyl alcohol    Abnormal Labs/Diagnostic Results:       Age/Sex: 80 y o  female     PER PA REVIEW     After review of the relevant documentation, labs, vital signs and test results, the patient is appropriate for INPATIENT ADMISSION       Admission to the hospital as an inpatient is a complex decision making process which requires the practitioner to consider the patients presenting complaint, history and physical examination and all relevant testing  With this in mind, in this case, the patient was deemed appropriate for INPATIENT ADMISSION   After review of the documentation and testing available at the time of the admission I concur with this clinical determination of medical necessity            Rationale is as follows:     The patient is a 80 yrs old Female who presented to the ED at 12/7/2018  9:05 AM with a chief complaint of Abdominal Pain (x2 days); Nausea; Vomiting; and Diarrhea      Given the need for further hospitalization, and along with the documentation of medical necessity present in the chart, the patient is appropriate for inpatient admission  The patient is expected to satisfy the 2 midnight benchmark, and will require further acute medical care  The patient does have comorbid conditions which increases the risk for significant adverse outcome   Given this the patient is appropriate for inpatient admission

## 2018-12-09 NOTE — SOCIAL WORK
Pt converted from OBS to inpatient 12/9  CM discussed same with pt daughter Liliya Turcios and mailed IMM  Pt not yet stable for discharge  CM awaiting PT/OT consults for recommendations to determine if pt able to return to [facility] or if she needs STR vs  [facility]  [facility] may be able to transport home upon discharge, if not pt will need W/C king  CM to follow

## 2018-12-09 NOTE — PLAN OF CARE
Problem: Potential for Falls  Goal: Patient will remain free of falls  INTERVENTIONS:  - Assess patient frequently for physical needs  -  Identify cognitive and physical deficits and behaviors that affect risk of falls    -  [place] fall precautions as indicated by assessment   - Educate patient/family on patient safety including physical limitations  - Instruct patient to call for assistance with activity based on assessment  - Modify environment to reduce risk of injury  - Consider OT/PT consult to assist with strengthening/mobility   Outcome: Progressing      Problem: PAIN - ADULT  Goal: Verbalizes/displays adequate comfort level or baseline comfort level  Interventions:  - Encourage patient to monitor pain and request assistance  - Assess pain using appropriate pain scale  - Administer analgesics based on type and severity of pain and evaluate response  - Implement non-pharmacological measures as appropriate and evaluate response  - Consider cultural and social influences on pain and pain management  - Notify physician/advanced practitioner if interventions unsuccessful or patient reports new pain   Outcome: Progressing      Problem: INFECTION - ADULT  Goal: Absence or prevention of progression during hospitalization  INTERVENTIONS:  - Assess and monitor for signs and symptoms of infection  - Monitor lab/diagnostic results  - Monitor all insertion sites, i e  indwelling lines, tubes, and drains  - Monitor endotracheal (as able) and nasal secretions for changes in amount and color  - [place] appropriate cooling/warming therapies per order  - Administer medications as ordered  - Instruct and encourage patient and family to use good hand hygiene technique  - Identify and instruct in appropriate isolation precautions for identified infection/condition   Outcome: Progressing      Problem: SAFETY ADULT  Goal: Maintain or return to baseline ADL function  INTERVENTIONS:  -  Assess patient's ability to carry out ADLs; assess patient's baseline for ADL function and identify physical deficits which impact ability to perform ADLs (bathing, care of mouth/teeth, toileting, grooming, dressing, etc )  - Assess/evaluate cause of self-care deficits   - Assess range of motion  - Assess patient's mobility; develop plan if impaired  - Assess patient's need for assistive devices and provide as appropriate  - Encourage maximum independence but intervene and supervise when necessary  ¯ Involve family in performance of ADLs  ¯ Assess for home care needs following discharge   ¯ Request OT consult to assist with ADL evaluation and planning for discharge  ¯ Provide patient education as appropriate   Outcome: Progressing    Goal: Maintain or return mobility status to optimal level  INTERVENTIONS:  - Assess patient's baseline mobility status (ambulation, transfers, stairs, etc )    - Identify cognitive and physical deficits and behaviors that affect mobility  - Identify mobility aids required to assist with transfers and/or ambulation (gait belt, sit-to-stand, lift, walker, cane, etc )  - [place] fall precautions as indicated by assessment  - Record patient progress and toleration of activity level on Mobility SBAR; progress patient to next Phase/Stage  - Instruct patient to call for assistance with activity based on assessment  - Request Rehabilitation consult to assist with strengthening/weightbearing, etc    Outcome: Progressing      Problem: Knowledge Deficit  Goal: Patient/family/caregiver demonstrates understanding of disease process, treatment plan, medications, and discharge instructions  Complete learning assessment and assess knowledge base  Interventions:  - Provide teaching at level of understanding  - Provide teaching via preferred learning methods   Outcome: Not Progressing  Pt  Oriented  x1  Self  And  Was  Unable  To  Understand  Any  Teaching  At  Bedside

## 2018-12-10 NOTE — PROGRESS NOTES
Progress Note - Lizbeth Art 80 y o  female MRN: [id number]    Unit/Bed#: -01 Encounter: [id number]      Assessment:  1   C diff colitis  now on vancomycin p o   2   Acute on chronic renal failure  BUN creatinine at or near patient's baseline  Will DC all IV fluids and push p o  fluids  3   Diabetes  On insulin coverage  4   Hypertension  Controlled  5   Dementia  Ongoing    Plan:  As above    Subjective:   No subjective complaint  Patient demented    Objective:     Vitals: Blood pressure 94/60, pulse 75, temperature 98 3 °F (36 8 °C), temperature source Tympanic, resp  rate 18, height 5' 2" (1 575 m), weight 55 kg (121 lb 3 2 oz), SpO2 93 %  ,Body mass index is 22 17 kg/m²        Intake/Output Summary (Last 24 hours) at 12/09/18 1925  Last data filed at 12/09/18 1601   Gross per 24 hour   Intake              360 ml   Output                2 ml   Net              358 ml       Physical Exam: BP 94/60 (BP Location: Right arm)   Pulse 75   Temp 98 3 °F (36 8 °C) (Tympanic)   Resp 18   Ht 5' 2" (1 575 m)   Wt 55 kg (121 lb 3 2 oz)   SpO2 93%   BMI 22 17 kg/m²     General Appearance:    Alert, cooperative, no distress, appears stated age   Head:    Normocephalic, without obvious abnormality, atraumatic   Eyes:    PERRL, conjunctiva/corneas clear, EOM's intact   Ears:     Nose:    Throat:    Neck:   Supple, symmetrical, trachea midline, no adenopathy;     thyroid:  no enlargement/tenderness/nodules; no carotid    bruit or JVD   Back:     Symmetric, no curvature, ROM normal, no CVA tenderness   Lungs:     Clear to auscultation bilaterally, respirations unlabored   Chest Wall:    No tenderness or deformity    Heart:    Regular rate and rhythm, S1 and S2 normal, no murmur, rub   or gallop   Breast Exam:      Abdomen:     Soft, non-tender, bowel sounds active all four quadrants,     no masses, no organomegaly   Genitalia:     Rectal:     Extremities:   Extremities normal, atraumatic, no cyanosis or edema   Pulses:   2+ and symmetric all extremities   Skin:   Skin color, texture, turgor normal, no rashes or lesions   Lymph nodes:   Cervical, supraclavicular, and axillary nodes normal   Neurologic:   CNII-XII intact, normal strength         Invasive Devices     Peripheral Intravenous Line            Peripheral IV 12/07/18 Right Forearm 2 days                Lab, Imaging and other studies: I have personally reviewed pertinent reports      VTE Pharmacologic Prophylaxis: Heparin

## 2018-12-10 NOTE — NURSING NOTE
Pt assessed and discovered to have 76 pulse ox, put patient on 2 L nasal cannula and sat her upright at 70 deg, pulse ox came up to 90 within 5 minutes

## 2018-12-10 NOTE — NURSING NOTE
Pt had issues with respiratory status this am   Patient was put on 2L n/c due to a pulse ox of 77 around 3 am, then progressed to 4L n/c due to a pulse ox of 88  Pt lungs had rhonchi/rales all the way up, had labored breathing  Contacted Dr and supervisor, attending put in orders for EKG, CBC, MI profile, chest x-ray, albuterol nebulizer, and 40 mg Lasix IV  Pt respiratory status appears to be resolved at this time after implementing orders

## 2018-12-10 NOTE — PLAN OF CARE
Problem: DISCHARGE PLANNING - CARE MANAGEMENT  Goal: Discharge to post-acute care or home with appropriate resources  INTERVENTIONS:  - Conduct assessment to determine patient/family and health care team treatment goals, and need for post-acute services based on payer coverage, community resources, and patient preferences, and barriers to discharge  - Address psychosocial, clinical, and financial barriers to discharge as identified in assessment in conjunction with the patient/family and health care team  - Arrange appropriate level of post-acute services according to patient's   needs and preference and payer coverage in collaboration with the physician and health care team  - Communicate with and update the patient/family, physician, and health care team regarding progress on the discharge plan  - Arrange appropriate transportation to post-acute venues  - Patient goal is to return to [facility] upon discharge    INTERVENTIONS:  - Conduct assessment to determine patient/family and health care team treatment goals, and need for post-acute services based on payer coverage, community resources, and patient preferences, and barriers to discharge  - Address psychosocial, clinical, and financial barriers to discharge as identified in assessment in conjunction with the patient/family and health care team  - Arrange appropriate level of post-acute services according to patient's   needs and preference and payer coverage in collaboration with the physician and health care team  - Communicate with and update the patient/family, physician, and health care team regarding progress on the discharge plan  - Arrange appropriate transportation to post-acute venues    D/c to snf  Outcome: Progressing

## 2018-12-10 NOTE — RESPIRATORY THERAPY NOTE
RT Protocol Note  Ratna Templeton 80 y o  female MRN: [id number]  Unit/Bed#: -01 Encounter: [id number]    Assessment    Principal Problem:    Acute kidney injury ([facility] )  Active Problems:    Gastroenteritis    Nausea & vomiting    Acute diarrhea      Home Pulmonary Medications:  none  Home Devices/Therapy: Other (Comment)    Past Medical History:   Diagnosis Date    Constipation     Diabetes mellitus ([facility] )     DJD (degenerative joint disease)     Gait abnormality     GERD (gastroesophageal reflux disease)     History of fall     Hypercholesterolemia     Hypertension     Iron deficiency anemia     Low back pain     Vitamin B12 deficiency      Social History     Social History    Marital status: Single     Spouse name: N/A    Number of children: N/A    Years of education: N/A     Social History Main Topics    Smoking status: Unknown If Ever Smoked    Smokeless tobacco: Never Used    Alcohol use No    Drug use: No    Sexual activity: Not Asked     Other Topics Concern    None     Social History Narrative    None       Subjective         Objective    Physical Exam:   Assessment Type: (P) Pre-treatment  General Appearance: (P) Alert, Awake (hard of hearing)  Respiratory Pattern: (P) Labored  Chest Assessment: (P) Chest expansion symmetrical  Bilateral Breath Sounds: (P) Diminished, Rales, Expiratory wheezes  Cough: (P) None    Vitals:  Blood pressure 165/62, pulse 87, temperature 98 1 °F (36 7 °C), temperature source Tympanic, resp  rate 18, height 5' 2" (1 575 m), weight 55 kg (121 lb 3 2 oz), SpO2 91 %  Imaging and other studies: I have personally reviewed pertinent reports              Plan    Respiratory Plan: (P) Mild Distress pathway

## 2018-12-10 NOTE — PHYSICAL THERAPY NOTE
Physical Therapy Evaluation     Patient's Name: [facility]    Admitting Diagnosis  Dehydration [E86 0]  Gastroenteritis [K52 9]  Vomiting [R11 10]  Abdominal pain [R10 9]  Nausea and vomiting [R11 2]  Chronic kidney disease [N18 9]  Acute kidney injury ([facility] ) [N17 9]    Problem List  Patient Active Problem List   Diagnosis    Acute kidney injury ([facility] )    Gastroenteritis    Nausea & vomiting    Acute diarrhea       Past Medical History  Past Medical History:   Diagnosis Date    Constipation     [facility]     DJD (degenerative joint disease)     Gait abnormality     GERD (gastroesophageal reflux disease)     History of fall     Hypercholesterolemia     Hypertension     Iron deficiency anemia     Low back pain     Vitamin B12 deficiency        Past Surgical History  History reviewed  No pertinent surgical history  12/10/18 0900   Note Type   Note type Eval/Treat   Pain Assessment   Pain Assessment No/denies pain   Pain Score No Pain   Home Living   [facility] Ass[facility]   Additional Comments Unable to provide details on living scenario due to cognitive status    Prior Function   Level of [place] (unable to assess due to patient's cognitive state )   Lives With Facility staff   Vocational Retired   Restrictions/Precautions   [place] Bearing Precautions Per Order No   Other Precautions Cognitive; Fall Risk;Hard of hearing;Droplet precautions;Contact/isolation   General   Family/Caregiver Present No   Cognition   Overall Cognitive Status Impaired   Arousal/Participation Cooperative   Orientation Level Oriented to person   Memory Decreased recall of recent events;Decreased recall of biographical information   Following Commands Follows one step commands inconsistently   RLE Assessment   RLE Assessment X  (Patient LE's dependent, limited PROM )   LLE Assessment   LLE Assessment X  (pt LE's dependent, limited passive ROM )   Coordination   Movements are Fluid and Coordinated 0   Coordination and Movement Description unable to stand or support self when sitting   Bed Mobility   Rolling R 2  Maximal assistance   Additional items Assist x 2;LE management;Verbal cues   Rolling L 2  Maximal assistance   Additional items Assist x 2;LE management;Verbal cues   Supine to Sit 2  Maximal assistance   Additional items Assist x 1;LE management;Verbal cues; Increased time required   Sit to Supine 2  Maximal assistance   Additional items Assist x 2;LE management;Verbal cues; Increased time required   Balance   Static Sitting Poor +   Dynamic Sitting Poor -   Endurance Deficit   Endurance Deficit Yes   Endurance Deficit Description wheezing after rolling in bed to change soiled sheets    Activity Tolerance   Activity Tolerance Treatment limited secondary to medical complications (Comment)   Nurse Made Aware Martha Borrero made aware of patient state and response to bed mobility    Assessment   Prognosis Fair   Problem List Decreased strength;Decreased range of motion;Decreased endurance;Decreased mobility; Decreased coordination;Decreased cognition; Impaired judgement;Decreased safety awareness; Impaired hearing   Assessment Pt is 80 y o  female seen for PT evaluation s/p admit to [address] [place] [address] on 12/7/2018 w/ Acute kidney injury ([facility] )  PT consulted to assess pt's functional mobility and d/c needs  Order placed for PT eval and tx, w/ activity as tolerated order  Comorbidities affecting pt's physical performance at time of assessment include: DUSTIN, nausea and vomiting, diarrhea, reduced cognitive status, generalized weakness  PTA, pt was resident of [facility]   Personal factors affecting pt at time of IE include: communication issues, inability to ambulate household distances, inability to navigate community distances, inability to navigate level surfaces w/o external assistance, decreased cognition, hearing impairments, impulsivity, inability to perform IADLs, inability to perform ADLs and inability to live alone  Please find objective findings from PT assessment regarding body systems outlined above with impairments and limitations including weakness, decreased ROM, decreased endurance, impaired coordination, decreased activity tolerance, decreased functional mobility tolerance, decreased safety awareness, impaired judgement, fall risk, SOB upon exertion and decreased cognition  The following objective measures performed on IE also reveal limitations: Barthel Index: 10/100  Pt's clinical presentation is currently unstable/unpredictable  Pt to benefit from continued PT tx to address deficits as defined above and maximize level of functional independent mobility and consistency  From PT/mobility standpoint, recommendation at time of d/c would be STR pending progress in order to facilitate return to PLOF  Goals   Patient Goals unable to report due to cognitive status   STG Expiration Date 12/20/18   Short Term Goal #1 1 ) Patient will complete bed mobility with Min A of 2 for decrease need for caregiver assistance, decrease burden of care  2 ) Patient will complete transfers with Min A x2 to decrease risk of falls, facilitate upright standing posture  3 ) BLE strength to greater than/equal to 3+/5 gross musculature to increase ability to safely transfer, control descent to chair  4 ) Patient will demonstrate static standing balance to Fair , for 3-5 minutes without LOB and Min A x2 w/ RW to improve activity tolerance  5 ) Patient will demonstrate dynamic ambulatory balance to Fair to improve ability to mobilize to toilet, chair and decrease risk for additional medical complications  6 ) Patient will exhibit good self monitoring and ability to follow 2 step commands to increase complexity of tasks and resume ADL's without LOB  Treatment Day 1   Plan   Treatment/Interventions Functional transfer training;LE strengthening/ROM; Therapeutic exercise; Endurance training;Cognitive reorientation;Patient/family training;Equipment eval/education;Gait training;Bed mobility;OT   PT Frequency 5x/wk   Recommendation   Recommendation Short-term skilled PT   Equipment Recommended Other (Comment)  (Will assess when appropriate at later date)   PT - OK to Discharge No   Additional Comments Patient's soiled linens were changed, and she was positioned in bed with her call muñoz in reach  Nurse was informed on her perforamnce and current status    Barthel Index   Feeding 5   Bathing 0   Grooming Score 0   Dressing Score 0   Bladder Score 0   Bowels Score 0   Toilet Use Score 0   Transfers (Bed/Chair) Score 5   Mobility (Level Surface) Score 0   Stairs Score 0   Barthel Index Score 10     Additional Skilled Intervention: Therapeutic Activity x8 min, Pt LE's were ranged and bed mobility was practiced to toleration so that her linens could be changed  Pt then sat up at EOB for 3 minutes  Current cognitive status limits ability to receive education or assess response to activity       Alisha Waldron,SPT

## 2018-12-10 NOTE — PROGRESS NOTES
Progress Note - Phill Acosta 80 y o  female MRN: [id number]    Unit/Bed#: -01 Encounter: [id number]        Subjective:   Patient seen and examined at bedside after reviewing the chart and discussing the case with the caring staff  I received a phone call early this morning around 3:00 a m  that patient developed acute shortness of breath with O2 sats in the 80s  Patient received oxygen via nasal cannula 5-6 L initially  Stat labs were ordered including cardiac enzymes, BNP, CBC, CXR and 12 lead EKG  Patient's chest x-ray showed evidence of diffuse bilateral airspace disease/edema left greater than the right with the evidence of bilateral layering effusion  Patient's cardiac enzymes showed slightly elevated troponins  Patient received Lasix 40 mg IV x2 doses and diuresed well  On examination at bedside patient seems comfortable not in respiratory distress although she still has oxygen via nasal cannula 4 L  Physical Exam   Vitals: Blood pressure 120/62, pulse 68, temperature 98 2 °F (36 8 °C), temperature source Tympanic, resp  rate 16, height 5' 2" (1 575 m), weight 55 kg (121 lb 3 2 oz), SpO2 92 %  ,Body mass index is 22 17 kg/m²  Constitutional: He appears well-developed  HEENT: PERR, EOMI, MMM  Cardiovascular: Normal rate and regular rhythm  Pulmonary/Chest: Effort normal and breath sounds normal    Abdomen: Soft, + BS, NT    Assessment/Plan:  Phill Acosta is a(n) 80y o  year old female with Acute Kidney Injury secondary to volume depletion from vomiting/diarrhea      1  Acute respiratory distress with possible fluid overload due to IV hydration  Patient has received Lasix IV 40 mg x 2 along with oxygen via nasal cannula 4 L  We will try to wean the patient off oxygen as patient has diuresed well after Lasix  2  Cardiac with history of Hypertension and Hyperlipidemia and now elevated troponin levels  I will consult Cardiology for further evaluation and treatment     Continue Hydralazine 25mg bid, Isosorbide dinitrate 10mg tid, Metoprolol tartrate 25mg bid  3  Acute Kidney Injury with history of chronic kidney disease and possible diabetic nephropathy  Dr Corinne Danielle is on consult  Acute kidney injury seems to have resolved with her creatinine levels back to below her baseline  4  Type 2 Diabetes Mellitus  Continue Levemir 6 units in the AM and 4 units in the PM along with Tradjenta 5mg once daily with low dose  insulin sliding scale     Patient's hemoglobin A1c was found to be 6 1  Patient will be on Level 3 Carb Controlled Diet  Accu Checks ACHS  5  Acute C diff colitis  Patient is currently on vancomycin p o  I will add acidophilus to the patient  6  GERD  Continue Pepcid 20mg once daily  7  History of Anemia  Mild at this stage  Continue Ferrous Sulfate 325mg once daily  8  DJD/OA with low back pain  Calcium carbonate 600mg once daily along with Tylenol as needed for pain  9  Gait Disturbance  PT/OT will be on consult for this  10  Dry Eyes  Artificial Tears as needed

## 2018-12-10 NOTE — SOCIAL WORK
Chart reviewed by case management, I called Holly @[phone] to discuss d/c plan, she gave [facility]  And she will talk to her family to get a list of additional choices, [facility] was [facility], the pt;s grand-daughter works at the [address] [place] [address] [employer]

## 2018-12-10 NOTE — PLAN OF CARE
Problem: PHYSICAL THERAPY ADULT  Goal: Performs mobility at highest level of function for planned discharge setting  See evaluation for individualized goals  Treatment/Interventions: Functional transfer training, LE strengthening/ROM, Therapeutic exercise, Endurance training, Cognitive reorientation, Patient/family training, Equipment eval/education, Gait training, Bed mobility, OT  Equipment Recommended: Other (Comment) (Will assess when appropriate at later date)       See flowsheet documentation for full assessment, interventions and recommendations  Reilly Saba    Prognosis: Fair  Problem List: Decreased strength, Decreased range of motion, Decreased endurance, Decreased mobility, Decreased coordination, Decreased cognition, Impaired judgement, Decreased safety awareness, Impaired hearing  Assessment: Pt is 80 y o  female seen for PT evaluation s/p admit to [address] [place] [address] on 12/7/2018 w/ Acute kidney injury ([facility] [place] [address] )  PT consulted to assess pt's functional mobility and d/c needs  Order placed for PT eval and tx, w/ activity as tolerated order  Comorbidities affecting pt's physical performance at time of assessment include: DUSTIN, nausea and vomiting, diarrhea, reduced cognitive status, generalized weakness  PTA, pt was resident [facility]  Personal factors affecting pt at time of IE include: communication issues, inability to ambulate household distances, inability to navigate community distances, inability to navigate level surfaces w/o external assistance, decreased cognition, hearing impairments, impulsivity, inability to perform IADLs, inability to perform ADLs and inability to live alone   Please find objective findings from PT assessment regarding body systems outlined above with impairments and limitations including weakness, decreased ROM, decreased endurance, impaired coordination, decreased activity tolerance, decreased functional mobility tolerance, decreased safety awareness, impaired judgement, fall risk, SOB upon exertion and decreased cognition  The following objective measures performed on IE also reveal limitations: Barthel Index: 10/100  Pt's clinical presentation is currently unstable/unpredictable  Pt to benefit from continued PT tx to address deficits as defined above and maximize level of functional independent mobility and consistency  From PT/mobility standpoint, recommendation at time of d/c would be STR pending progress in order to facilitate return to PLOF  Recommendation: Short-term skilled PT     PT - OK to Discharge: No    See flowsheet documentation for full assessment     Reilly Saba

## 2018-12-11 NOTE — PHYSICAL THERAPY NOTE
12/11/18 0942   Pain Assessment   Pain Assessment 0-10   Pain Score No Pain   Restrictions/Precautions   Weight Bearing Precautions Per Order No   Other Precautions Contact/isolation;Cognitive; Fall Risk;Hard of hearing   General   Family/Caregiver Present No   Cognition   Overall Cognitive Status Impaired   Arousal/Participation Alert; Cooperative   Attention Attends with cues to redirect   Orientation Level Oriented to person   Memory Decreased long term memory;Decreased recall of biographical information;Decreased short term memory;Decreased recall of recent events;Decreased recall of precautions   Following Commands Follows one step commands inconsistently   Subjective   Subjective pt denies pain when asked   Bed Mobility   Rolling R 2  Maximal assistance   Additional items Assist x 1; Increased time required;Verbal cues;LE management   Rolling L 2  Maximal assistance   Additional items Assist x 1; Increased time required;Verbal cues;LE management   Supine to Sit 2  Maximal assistance   Additional items Assist x 1; Increased time required;Verbal cues;LE management   Transfers   Sit to Stand 4  Minimal assistance   Additional items Assist x 2; Increased time required;Verbal cues   Stand to Sit 4  Minimal assistance   Additional items Assist x 2;Armrests; Verbal cues  (tactile cues for sitting from standing)   Additional Comments 3-4 steps from bed to chair with RW min x 2   Ambulation/Elevation   Gait pattern Improper Weight shift;Narrow BEATRICE; Forward Flexion;Decreased foot clearance;Shuffling;Excessively slow   Gait Assistance 4  Minimal assist   Additional items Assist x 2;Verbal cues; Tactile cues   Assistive Device Rolling walker   Distance 3 feet bed to chair   Stair Management Assistance Not tested   Balance   Static Sitting Poor +   Dynamic Sitting Poor   Static Standing Poor +   Dynamic Standing Poor   Ambulatory Poor   Endurance Deficit   Endurance Deficit Yes   Activity Tolerance   Activity Tolerance Patient limited by fatigue;Treatment limited secondary to medical complications (Comment)   Nurse Made Aware yes Erna Joe aware   Assessment   Prognosis Fair   Problem List Decreased strength;Decreased range of motion;Decreased endurance; Impaired balance;Decreased mobility; Decreased coordination;Decreased cognition; Impaired judgement;Decreased safety awareness; Impaired hearing   Assessment Pt seen for PT treatment session this date with interventions consisting of therapeutic activity consisting of training: bed mobility, supine<>sit transfers, sit<>stand transfers and stand pivot transfer 3 feet x 1 with RW min x 2  Pt agreeable to PT treatment session upon arrival, pt found supine in bed w/ HOB elevated, in no apparent distress  In comparison to previous session, pt with no improvements as evidenced by cont'd need for assist x 2 for transfers OOB to chair  Post session: all needs in reach in bedside chair, alarm in place  Continue to recommend STR at time of d/c in order to maximize pt's functional independence and safety w/ mobility  Pt continues to be functioning below baseline level, and remains limited 2* factors listed above and including decreased strength, endurance & safe functional mobility  PT will continue to see pt while here in order to address the deficits listed above and provide interventions consistent w/ POC in effort to achieve STGs   Goals   Patient Goals unable to state due to cognition   Treatment Day 2   Plan   Treatment/Interventions Functional transfer training;LE strengthening/ROM; Therapeutic exercise; Endurance training;Cognitive reorientation;Patient/family training;Equipment eval/education; Bed mobility;Gait training;Spoke to nursing   Progress No functional improvements   PT Frequency 5x/wk   Recommendation   Recommendation Short-term skilled PT   Equipment Recommended (TBD)   PT - OK to Discharge Yes  (when med cleared to STR)   Additional Comments pt in bedside chair, all needs in reach, alarm in place, SCDs active post session   Christopher Iglesias, PTA

## 2018-12-11 NOTE — OCCUPATIONAL THERAPY NOTE
Occupational Therapy Evaluation      [facility]    12/11/2018    Patient Active Problem List   Diagnosis    [facility] kidney injury ([facility] )    Gastroenteritis    Nausea & vomiting    Acute diarrhea       Past Medical History:   Diagnosis Date    Constipation     Diabetes mellitus ([facility] )     DJD (degenerative joint disease)     Gait abnormality     GERD (gastroesophageal reflux disease)     History of fall     Hypercholesterolemia     Hypertension     Iron deficiency anemia     Low back pain     Vitamin B12 deficiency        History reviewed  No pertinent surgical history  12/11/18 0982   Note Type   Note type Eval/Treat   Restrictions/Precautions   Weight Bearing Precautions Per Order No   Other Precautions Fall Risk;Cognitive;Contact/isolation;Hard of hearing   Pain Assessment   Pain Assessment No/denies pain   Pain Score No Pain   Home Living   Type of Home Assisted living   Additional Comments DETAILS UNCERTAIN   Prior Function   Level of [place] Needs assistance with IADLs; Needs assistance with ADLs and functional mobility   Lives With Facility staff   Receives Help From Personal care attendant   ADL Assistance Needs assistance   IADLs Needs assistance   Falls in the last 6 months (UNCERTAIN)   Comments Reportedly Min A with ADL's and ambulation with walker    Subjective   Subjective denies pain   ADL   Eating Assistance 3  Moderate Assistance   Additional Comments dependent for most self care ADL's at this time   Bed Mobility   Rolling R 2  Maximal assistance   Additional items Verbal cues; Increased time required;LE management;Assist x 1   Rolling L 2  Maximal assistance   Additional items Assist x 1   Supine to Sit 2  Maximal assistance   Transfers   Sit to Stand 4  Minimal assistance   Additional items Assist x 2;Verbal cues; Increased time required   Stand to Sit 4  Minimal assistance   Additional items Assist x 2;Verbal cues;Armrests  (tactile cues)   Additional Comments approximately 3 steps to chair, with VC's and tactile cues throughout, not safe   Balance   Static Sitting Poor +   Dynamic Sitting Poor   Static Standing Poor +   Dynamic Standing Poor   Activity Tolerance   Activity Tolerance Patient limited by fatigue  (only able to take a few steps)   Nurse Made Aware yes   RUE Assessment   RUE Assessment WFL  (range WFL's, strength F/F+ as able to assess)   LUE Assessment   LUE Assessment WFL  (range grossly WFL's, strength F/F+ as able to assess)   Hand Function   Gross Motor Coordination Functional   Fine Motor Coordination Functional   Cognition   Overall Cognitive Status Impaired   Arousal/Participation Alert; Cooperative   Attention Attends with cues to redirect   Orientation Level Oriented to person;Disoriented to place; Disoriented to time;Disoriented to situation  (unable to state birthday or age, oriented to name only)   Memory Decreased long term memory;Decreased recall of biographical information;Decreased short term memory;Decreased recall of recent events;Decreased recall of precautions   Following Commands Follows multistep commands inconsistently   Assessment   Limitation Decreased ADL status; Decreased UE strength;Decreased cognition;Decreased endurance;Decreased Safe judgement during ADL;Decreased self-care trans   Prognosis Fair   Assessment Pt is a 80 y o  female seen for OT evaluation s/p admit to [facility] on 12/7/2018 w/ Acute kidney injury ([facility] [address] )  Comorbidities affecting pt's functional performance at time of assessment include: limited hearing and limited cognition  Personal factors affecting pt at time of IE include:difficulty performing ADLS  Prior to admission, pt was requiring min assistance with self care ADL's and mobility with RW (D for IADL's)   Upon evaluation: Pt requires max to D with ADL's, Mod to max A with mobility and transfers r/t the following deficits impacting occupational performance: weakness, decreased strength, decreased balance, decreased tolerance and decreased safety awareness  Pt to benefit from continued skilled OT tx while in the hospital to address deficits as defined above and maximize level of functional independence w ADL's and functional mobility  Occupational Performance areas to address include: eating, grooming, bathing/shower, dressing and functional mobility  From OT standpoint, recommendation at time of d/c would be STR vs [facility] with services at facility  Goals   Patient Goals unable to stste   STG Time Frame 3-5   Short Term Goal #1 increase self feeding to set up/S/VC's   Short Term Goal #2 initiate participation in light UB self care ADL's with BATISTA/S/VC's (face hands)   LTG Time Frame 7-10   Long Term Goal #1 increase bed mobility to min A X 1, to prepare for self care and lessen burden of care+-   Long Term Goal #2 increase UE strength X 1/2 grade, for increased ability to safely assist with functional transfers   Long Term Goal increase UB self care to Min A with VC's as needed, progress to LB as safely possible   Plan   Treatment Interventions ADL retraining;Functional transfer training;UE strengthening/ROM; Endurance training;Patient/family training; Activityengagement   Goal Expiration Date 12/21/18   Treatment Day 0   OT Frequency 3-5x/wk   Recommendation   OT Discharge Recommendation Short Term Rehab  (vs return to [facility] with in facility therapies)   Barthel Index   Feeding 5   Bathing 0   Grooming Score 0   Dressing Score 0   Bladder Score 0   Bowels Score 0   Toilet Use Score 0   Transfers (Bed/Chair) Score 5   Mobility (Level Surface) Score 0   Stairs Score 0   Barthel Index Score 10   Vira Sheets, OT

## 2018-12-11 NOTE — CONSULTS
Consultation - Cardiology   La Brooks 80 y o  female MRN: [id number]  Unit/Bed#: -01 Encounter: [id number]  12/11/18  11:25 AM    Assessment / Plan:     1  Elevated troponin  · 0 07, 0 08, 0 09  Levels flat, no rise/fall  · Likely secondary to renal insufficiency, hypoxia  · Unlikely representative of ACS    2  Acute dyspnea, resolved  · CXR - diffuse b/l airspace disease/edema with L>R effusions  · BNP 2492 - will check an echo  · Preserved LV function on echo in 2015 (see description below)  · Improved with lasix and supplemental O2     3  Hypertension  · Adequately controlled  · Continue hydralazine, metoprolol, isosorbide    3  Valvular disease  · Moderate AS and mild-mod MR reported on TRISTAN in 2015  · Will re-check echo    4  Acute on chronic renal insufficiency  · No baseline labs available  · Cr 3 24 on admission, now trending down  Per Dr Honey Eric, she is back to her baseline  · Likely responsible for minor troponin elevation    HPI: La Brooks is a 80y o  year old female who presented with abdominal pain, N/V/D  The patient is unable to provide a history  Per chart review, she was found to be acutely dyspneic yesterday with O2 sats in the 80s on room air  Troponin levels were found to be slightly elevated  She was placed on supplemental O2 and received 2 doses of IV lasix  This morning she was 97% on room air and did not appear to be in any respiratory distress  EKG: Limb leads reversed  Will repeat  Most recent cardiac imaging: Echo ordered     TRISTAN in Jan 2015: Mild-mod LVH  Normal LV systolic function, EF 91%  Moderate AS, mild-mod MR      Review of Systems:   Review of Systems   Unable to perform ROS: dementia     Historical Information   Past Medical History:   Diagnosis Date    Constipation     Diabetes mellitus ([facility] [address] )     DJD (degenerative joint disease)     Gait abnormality     GERD (gastroesophageal reflux disease)     History of fall     Hypercholesterolemia     Hypertension     Iron deficiency anemia     Low back pain     Vitamin B12 deficiency      History reviewed  No pertinent surgical history  History   Alcohol Use No     History   Drug Use No     History   Smoking Status    Unknown If Ever Smoked   Smokeless Tobacco    Never Used     Family History:   History reviewed  No pertinent family history  Meds/Allergies   all current active meds have been reviewed  Prescriptions Prior to Admission   Medication    atorvastatin (LIPITOR) 10 mg tablet    calcium carbonate (CALCIUM 600) 600 MG tablet    cyanocobalamin (VITAMIN B-12) 1,000 mcg tablet    famotidine (PEPCID) 20 mg tablet    ferrous sulfate 325 (65 Fe) mg tablet    hydrALAZINE (APRESOLINE) 25 mg tablet    insulin detemir (LEVEMIR) 100 units/mL subcutaneous injection    isosorbide dinitrate (ISORDIL) 10 mg tablet    Linagliptin (TRADJENTA) 5 MG TABS    Magnesium Chloride (MAG64 PO)    metoprolol tartrate (LOPRESSOR) 25 mg tablet    ondansetron (ZOFRAN) 4 mg tablet    pantoprazole (PROTONIX) 40 mg tablet    polyethylene glycol (MIRALAX) 17 g packet    Polyvinyl Alcohol-Povidone (ARTIFICIAL TEARS) 5-6 MG/ML SOLN    acetaminophen (TYLENOL) 650 mg CR tablet    docusate sodium (COLACE) 100 mg capsule       No Known Allergies    Objective   Vitals: Blood pressure 137/68, pulse 69, temperature (!) 96 9 °F (36 1 °C), temperature source Tympanic, resp  rate 16, height 5' 2" (1 575 m), weight 55 6 kg (122 lb 9 6 oz), SpO2 97 %  , Body mass index is 22 42 kg/m² ,   Orthostatic Blood Pressures      Most Recent Value   Blood Pressure  137/68 filed at 12/11/2018 5218   Patient Position - Orthostatic VS  Lying filed at 12/11/2018 9267        Physical Exam   Physical Exam   Constitutional: She appears well-developed and well-nourished  No distress  HENT:   Head: Normocephalic and atraumatic  Eyes: EOM are normal  No scleral icterus  Neck: Normal range of motion  Neck supple     Cardiovascular: Normal rate, regular rhythm, S1 normal, S2 normal and intact distal pulses  Murmur (Grade 3/6 systolic murmur at the URSB ) heard  Pulmonary/Chest: Effort normal and breath sounds normal  She has no wheezes  She has no rales  Abdominal: Soft  Bowel sounds are normal    Musculoskeletal: She exhibits no edema  Neurological: She is alert  Skin: Skin is warm and dry  Psychiatric: She has a normal mood and affect  Nursing note and vitals reviewed      Lab Results:     Troponins:     Results from last 7 days  Lab Units 12/10/18  1204 12/10/18  0936 12/10/18  0559   TROPONIN I ng/mL 0 09* 0 08* 0 07*     BNP:    Results from last 6 Months  Lab Units 12/10/18  0559   BNP pg/mL 2,492*     CBC with diff:     Results from last 7 days  Lab Units 12/11/18  1030 12/10/18  0559 12/08/18  0538   WBC Thousand/uL 8 10 12 90* 6 80   HEMOGLOBIN g/dL 10 9* 10 0* 9 0*   HEMATOCRIT % 34 0* 30 8* 27 3*   PLATELETS Thousands/uL 216 209 158   RBC Million/uL 3 76* 3 39* 2 99*     CMP:    Results from last 7 days  Lab Units 12/11/18  1030 12/09/18  0532 12/08/18  0538 12/07/18  1743 12/07/18  0915   POTASSIUM mmol/L 3 1* 3 4* 3 5 3 5 4 0   CHLORIDE mmol/L 106 112* 114* 108* 107   CO2 mmol/L 27 24 24 26 29   BUN mg/dL 23 30* 35* 41* 46*   CREATININE mg/dL 2 67* 2 46* 2 73* 2 82* 3 24*   CALCIUM mg/dL 8 9 8 5* 8 1* 8 5* 9 4   AST U/L 27  --  9* 12* 17   ALT U/L 14  --  7 7 10   ALK PHOS U/L 49*  --  45* 54* 66   EGFR ml/min/1 73sq m 15 17 15 14 12     TSH:     Coags:     Results from last 7 days  Lab Units 12/07/18  0915   INR  1 08

## 2018-12-11 NOTE — PLAN OF CARE
Problem: PHYSICAL THERAPY ADULT  Goal: Performs mobility at highest level of function for planned discharge setting  See evaluation for individualized goals  Treatment/Interventions: Functional transfer training, LE strengthening/ROM, Therapeutic exercise, Endurance training, Cognitive reorientation, Patient/family training, Equipment eval/education, Gait training, Bed mobility, OT  Equipment Recommended: Other (Comment) (Will assess when appropriate at later date)       See flowsheet documentation for full assessment, interventions and recommendations  [employer], [facility]     Outcome: Not Progressing  Prognosis: Fair  Problem List: Decreased strength, Decreased range of motion, Decreased endurance, Impaired balance, Decreased mobility, Decreased coordination, Decreased cognition, Impaired judgement, Decreased safety awareness, Impaired hearing  Assessment: Pt seen for PT treatment session this date with interventions consisting of therapeutic activity consisting of training: bed mobility, supine<>sit transfers, sit<>stand transfers and stand pivot transfer 3 feet x 1 with RW min x 2  Pt agreeable to PT treatment session upon arrival, pt found supine in bed w/ HOB elevated, in no apparent distress  In comparison to previous session, pt with no improvements as evidenced by cont'd need for assist x 2 for transfers OOB to chair  Post session: all needs in reach in bedside chair, alarm in place  Continue to recommend STR at time of d/c in order to maximize pt's functional independence and safety w/ mobility  Pt continues to be functioning below baseline level, and remains limited 2* factors listed above and including decreased strength, endurance & safe functional mobility   PT will continue to see pt while here in order to address the deficits listed above and provide interventions consistent w/ POC in effort to achieve STGs        Recommendation: Short-term skilled PT     PT - OK to Discharge: Yes (when med cleared to STR)    See flowsheet documentation for full assessment     Dossie Veronica, PTA

## 2018-12-11 NOTE — SOCIAL WORK
Chart reviewed by case management , I spoke with Devin Gonzalez at [facility] and she is able to accept the pt tomorrow, I called Holly @13:45 to make her aware of the acceptance and I reviewed imm with her, [facility] the [facility]  D/c plan was discussed at care coordination rounds today

## 2018-12-11 NOTE — NURSING NOTE
No changes in the pts assess throughout the day, dr Neeta Fung PA was in to see and assess the pt, orders noted, pt repositioned with good skin care done, calazyme applied to sacral/buttocks area, bed alarm on , pt presently in bed in no acute distress watching tv and eating dinner, callbell in reach of the pt

## 2018-12-11 NOTE — PROGRESS NOTES
Judy Olguin#  [id number] F  VB    ABV:[id number]  Adm Date: 2018  9:05 AM   ATT PHY: Raegan Porras Md  [address] [place] [address]         Subjective     The patient was seen after reviewing the chart and discussing the case with caring staff  Today during our encounter, the patient was unable to provide a history secondary to baseline marked confusion  However, of note, the patient's breathing has normalized with use of Lasix IV 40mg x2  She is no longer oxygen-dependent and SpO2 is within normal limits  Of note, patient's leukocytosis resolved from 12 90 down to 8  10  Anemia slightly improved, with H/H rising from 10 0/30 8 to 10 9/34 0  BMP showed decreased potassium from 3 4 down to 3 1  While Dr Danny Vidal viewed her kidney function on  to be near her baseline, it has slightly worsened since then, with creatinine of 2 67 and GFR of 15  BUN is WNL at 23  Objective     Vitals:    18 0717   BP: 137/68   Pulse: 69   Resp: 16   Temp: (!) 96 9 °F (36 1 °C)   SpO2: 97%       General Appearance: Awake and Alert  No acute distress  HEENT: Normocephalic, atraumatic  PERRLA, EOMI, MMM  Heart: RRR, no murmurs  Normal S1 and S2   Lungs: CTA bilaterally with fair air entry  Assessment     Jose Bottoms a(n) 80y o  year old female with Acute Kidney Injury secondary to volume depletion from vomiting/diarrhea and C  Diff      1  Acute respiratory distress with possible fluid overload due to IV hydration  Seems to have resolved  Patient has been successfully weaned off of oxygen  2  Cardiac with history of Hypertension and Hyperlipidemia and now elevated troponin levels  Cardiology is on consult  They believe that elevated Troponins are likely secondary to acute kidney injury  They also wish to recheck an echo  Otherwise continue Hydralazine 25mg bid, Isosorbide dinitrate 10mg tid, Metoprolol tartrate 25mg bid    3  Acute Kidney Injury with history of chronic kidney disease and possible diabetic nephropathy   Dr Richard Branch is on consult who believes patient is at or near baseline  Given slightly worsened levels today, I placed order to encourage PO fluids  Will continue to trend  4  Hypokalemia  KCL 20MEq bid today with repeat BMP tomorrow  5  Type 2 Diabetes Mellitus  Continue Levemir 6 units in the AM and 4 units in the PM along with Tradjenta 5mg once daily with low dose  insulin sliding scale     Patient's hemoglobin A1c was found to be 6 1  Patient is on Level 3 Carb Controlled Diet  Accu Checks ACHS  6  Acute C diff colitis  Patient is  on vancomycin p o  which has been changed to 125mg every 6 hours  Flagyl has been discontinued  Continue acidophilus  7  UTI  Continue Cipro 400mg IV every 24 hours for 4 more doses  8  GERD  Continue Pepcid 20mg once daily  9  History of Anemia  Mild at this stage  Continue Ferrous Sulfate 325mg once daily  10  DJD/OA with low back pain  Calcium carbonate 600mg once daily along with Tylenol as needed for pain  11  Gait Disturbance  PT/OT will be on consult for this  12  Dry Eyes  Artificial Tears as needed  The patient was discussed with Dr Piedad Mckay and he is in agreement with the above note

## 2018-12-12 NOTE — OCCUPATIONAL THERAPY NOTE
Unable to engage pt  In treatment beyond her very brief hair combing and wiping of her face (actually mouth Only)  She asked purpose of therapy but explanation did not prompt participation  She also did not give her birthdate and thought that she was "almost 36 years old  Cont  With POC as able    GINA Callaway/KUSUM

## 2018-12-12 NOTE — PLAN OF CARE
Problem: DISCHARGE PLANNING - CARE MANAGEMENT  Goal: Discharge to post-acute care or home with appropriate resources  INTERVENTIONS:  - Conduct assessment to determine patient/family and health care team treatment goals, and need for post-acute services based on payer coverage, community resources, and patient preferences, and barriers to discharge  - Address psychosocial, clinical, and financial barriers to discharge as identified in assessment in conjunction with the patient/family and health care team  - Arrange appropriate level of post-acute services according to patient's   needs and preference and payer coverage in collaboration with the physician and health care team  - Communicate with and update the patient/family, physician, and health care team regarding progress on the discharge plan  - Arrange appropriate transportation to post-acute venues  - Patient goal is to return to [facility] ([facility] upon discharge    INTERVENTIONS:  - Conduct assessment to determine patient/family and health care team treatment goals, and need for post-acute services based on payer coverage, community resources, and patient preferences, and barriers to discharge  - Address psychosocial, clinical, and financial barriers to discharge as identified in assessment in conjunction with the patient/family and health care team  - Arrange appropriate level of post-acute services according to patient's   needs and preference and payer coverage in collaboration with the physician and health care team  - Communicate with and update the patient/family, physician, and health care team regarding progress on the discharge plan  - Arrange appropriate transportation to post-acute venues    D/c to snf   Outcome: Completed Date Met: 12/12/18

## 2018-12-12 NOTE — PHYSICAL THERAPY NOTE
12/12/18 0936   Pain Assessment   Pain Assessment 0-10   Pain Score No Pain   Restrictions/Precautions   Weight Bearing Precautions Per Order No   Other Precautions Contact/isolation;Cognitive; Bed Alarm; Chair Alarm; Fall Risk;Hard of hearing   General   Family/Caregiver Present No   Cognition   Overall Cognitive Status Impaired   Arousal/Participation Responsive; Cooperative   Attention Attends with cues to redirect   Orientation Level Oriented to person   Memory Decreased long term memory;Decreased recall of biographical information   Following Commands Follows one step commands inconsistently   Subjective   Subjective pt denies pain, agreed to get OOB to chair   Bed Mobility   Supine to Sit 3  Moderate assistance   Additional items Assist x 1;Bedrails; Increased time required;Verbal cues;LE management   Transfers   Sit to Stand 3  Moderate assistance   Additional items Assist x 2; Increased time required;Verbal cues   Stand to Sit 3  Moderate assistance   Additional items Assist x 2; Increased time required;Verbal cues;Armrests   Stand pivot 3  Moderate assistance   Additional items Assist x 2; Increased time required;Verbal cues;Armrests   Additional Comments sat at EOB 5 mins prior to standing, stood x 2 mins for joanna care due to bowel incontinence   Ambulation/Elevation   Gait pattern Improper Weight shift;Narrow BEATRICE; Forward Flexion;Decreased foot clearance;Shuffling;Excessively slow   Gait Assistance 3  Moderate assist   Additional items Assist x 2;Verbal cues; Tactile cues   Assistive Device Rolling walker   Distance 3 feet bed to chair   Stair Management Assistance Not tested   Balance   Static Sitting Poor +   Dynamic Sitting Poor   Static Standing Poor +   Dynamic Standing Poor   Ambulatory Poor   Endurance Deficit   Endurance Deficit Yes   Activity Tolerance   Activity Tolerance Patient limited by fatigue;Treatment limited secondary to medical complications (Comment)   Assessment   Prognosis Fair   Problem List Decreased strength;Decreased range of motion;Decreased endurance; Impaired balance;Decreased mobility; Decreased coordination;Decreased cognition; Impaired judgement;Decreased safety awareness; Impaired hearing   Assessment Pt seen for PT treatment session this date with interventions consisting of therapeutic activity consisting of training: bed mobility, supine<>sit transfers, sit<>stand transfers, static sitting tolerance at EOB for 5  minutes w/ B UE support, static standing tolerance for 2 minutes w/ B UE support and stand pivot transfer bed to chair with RW mod x 2  Pt agreeable to PT treatment session upon arrival, pt found supine in bed w/ HOB elevated, in no apparent distress  In comparison to previous session, pt with no improvements as evidenced by need for mod x 2 for transfers, inability to amb more than 3-4 steps  Post session: chair alarm engaged and all needs in reach Continue to recommend STR at time of d/c in order to maximize pt's functional independence and safety w/ mobility  Pt continues to be functioning below baseline level, and remains limited 2* factors listed above and including decreased strength, endurance & safe functional mobility  PT will continue to see pt while here in order to address the deficits listed above and provide interventions consistent w/ POC in effort to achieve STGs  Goals   Patient Goals unable to state due to cognition   Treatment Day 3   Plan   Treatment/Interventions Functional transfer training;LE strengthening/ROM; Therapeutic exercise; Endurance training;Cognitive reorientation;Patient/family training;Equipment eval/education; Bed mobility;Gait training   Progress No functional improvements   PT Frequency 5x/wk   Recommendation   Recommendation Short-term skilled PT   Equipment Recommended (TBD)   PT - OK to Discharge Yes  (when med cleared to STR)   Additional Comments pt in bedside chair, alarm in place, all needs in reach post session   Nikki Tejada, PTA

## 2018-12-12 NOTE — SOCIAL WORK
Chart reviewed by case management, d/c order written, [facility], I called Holly @ 14:20 and made her aware of the d/c , I reviewed the imm with her yesterday and mailed her a copy , pt and family are in agreement with the [facility] plan to the [facility], d/c plan was discussed at care coordination rounds today

## 2018-12-12 NOTE — PLAN OF CARE
Problem: PHYSICAL THERAPY ADULT  Goal: Performs mobility at highest level of function for planned discharge setting  See evaluation for individualized goals  Treatment/Interventions: Functional transfer training, LE strengthening/ROM, Therapeutic exercise, Endurance training, Cognitive reorientation, Patient/family training, Equipment eval/education, Gait training, Bed mobility, OT  Equipment Recommended: Other (Comment) (Will assess when appropriate at later date)       See flowsheet documentation for full assessment, interventions and recommendations  [employer], [facility]     Outcome: Not Progressing  Prognosis: Fair  Problem List: Decreased strength, Decreased range of motion, Decreased endurance, Impaired balance, Decreased mobility, Decreased coordination, Decreased cognition, Impaired judgement, Decreased safety awareness, Impaired hearing  Assessment: Pt seen for PT treatment session this date with interventions consisting of therapeutic activity consisting of training: bed mobility, supine<>sit transfers, sit<>stand transfers, static sitting tolerance at EOB for 5  minutes w/ B UE support, static standing tolerance for 2 minutes w/ B UE support and stand pivot transfer bed to chair with RW mod x 2  Pt agreeable to PT treatment session upon arrival, pt found supine in bed w/ HOB elevated, in no apparent distress  In comparison to previous session, pt with no improvements as evidenced by need for mod x 2 for transfers, inability to amb more than 3-4 steps  Post session: chair alarm engaged and all needs in reach Continue to recommend STR at time of d/c in order to maximize pt's functional independence and safety w/ mobility  Pt continues to be functioning below baseline level, and remains limited 2* factors listed above and including decreased strength, endurance & safe functional mobility   PT will continue to see pt while here in order to address the deficits listed above and provide interventions consistent w/ POC in effort to achieve STGs  Recommendation: Short-term skilled PT     PT - OK to Discharge: Yes (when med cleared to STR)    See flowsheet documentation for full assessment     Colin Soni PTA

## 2018-12-12 NOTE — DISCHARGE SUMMARY
Discharge Summary -   Sylvia Cabezas 80 y o  female MRN: [id number]  Unit/Bed#: -01 Encounter: [id number]    Admission Date: 12/7/2018     Discharge Date: 12/12/2018    Admitting Diagnosis: Dehydration [E86 0]  Gastroenteritis [K52 9]  Vomiting [R11 10]  Abdominal pain [R10 9]  Nausea and vomiting [R11 2]  Chronic kidney disease [N18 9]  Acute kidney injury ([address] ) [N17 9]    Discharge Diagnosis:   1  Resolved dehydration  2  DUSTIN with h/o  Stage 2-3 chronic kidney disease  3  Resolving Acute C diff colitis  4   Acute UTI  5   Elevated troponins with NSTEMI     Attending:  Luis Enrique Maldonado MD    Consulting Physician(s):   Dr Barb Barboza Nephrologist  Dr Osito Owen Course:   Ms Sylvia Cabezas is a 27-year-old who was admitted to the medical floor with generalized abdominal pain nausea vomiting and diarrhea for 2 days  Patient seemed dehydrated due to the symptoms and was admitted to medical-surgical floor initially for observation  On initial workup it was also found that patient had evidence of acute kidney injury with history of chronic diabetic nephropathy with stage 2-3 chronic kidney disease  Patient's troponins were also found to be elevated  Patient was started on ciprofloxacin, Flagyl IV attending C diff test along with IV fluid resuscitation  Patient's C diff test confirmed C diff colitis  Patient was then switched to p o  vancomycin  Patient also received acidophilus and Zofran on as-needed basis  Dr Barb Barboza was consulted with acute kidney injury  He agreed with the fluid resuscitation  Dr Kati Walsh Cardiologist was also consulted with elevated troponins  He was of the opinion that patient had NSTEMI type 2 secondary to renal failure  He did not thought that patient had ACS  During this short course of his stay, over the weekend patient showed evidence of fluid overload with acute shortness of breath    Chest x-ray did showed evidence of congestion and edema  Patient received IV Lasix along with oxygen  Patient's symptoms resolved within 24 hr  On the day of discharge patient was examined at bedside  Patient's vitals were reviewed which were found to be within normal limits  Patient's CMP on the day of discharge showed patient's BUN to be 23 with creatinine of 2 57 which is her baseline creatinine  Patient's physical examination was found to be within normal limits  Patient is not having diarrhea for the past 2-3 days  Patient will continue to receive p o  vancomycin for 10 more days to complete 2 weeks of treatment  Patient will also continue to receive antibiotic Keflex 500 mg 2 times daily for 5 more days  Condition at Discharge:  FAIR     Discharge instructions/Information to patient and family:   See after visit summary for information provided to patient and family  Provisions for Follow-Up Care:  See after visit summary for information related to follow-up care and any pertinent home health orders  Disposition: Skilled nursing facility at [facility]    Discharge Statement   I spent 50 minutes discharging the patient  This time was spent on the day of discharge  I had direct contact with the patient on the day of discharge  Discharge Medications:  See after visit summary for reconciled discharge medications provided to patient and family

## 2018-12-12 NOTE — PROGRESS NOTES
Cardiology Progress Note - Ekaterina Barclay 80 y o  female MRN: [id number]    Unit/Bed#: -01 Encounter: [id number]      Assessment/Plan:  1  Moderate to severe AS-  · Giovana Flaherty had an echocardiogram 1/2018 that revealed moderate to severe aortic stenosis with concentric LVH and normal LV systolic function  There was no noted change compared to her echocardiogram from 9/8/16  · Given her age, CKD, and dementia she is not a candidate for intervention such as AVR or TAVR  This was discussed with her daughter Timothy Valle who was in agreement  · Given her aortic disease she is susceptible to volume shifts, and would be judicious with volume resuscitation  · Fortunately she is currently euvolemic and now off O2  Will continue to monitor  She is not on diuretics as an outpatient  · Would recommend a conversation regarding goals of care going forward as she is at high risk of readmission       2  NSTEMI Type 2-   · Secondary to renal failure, moderate-severe AS, and hypertension  · This does not represent ACS       3  Hypertension- cont isordil, hydralazine and metoprolol     4  CKD     5  DM2- on januvia and ssi     6  C  Diff colitis - per the primary team    Subjective:   Patient seen and examined  Awake however not conversational      Objective:     Vitals: Blood pressure 148/83, pulse 65, temperature 98 °F (36 7 °C), temperature source Tympanic, resp  rate 16, height 5' 2" (1 575 m), weight 55 6 kg (122 lb 9 6 oz), SpO2 98 %  , Body mass index is 22 42 kg/m² , Orthostatic Blood Pressures      Most Recent Value   Blood Pressure  148/83 filed at 12/12/2018 0700   Patient Position - Orthostatic VS  Lying filed at 12/12/2018 0700            Intake/Output Summary (Last 24 hours) at 12/12/18 1047  Last data filed at 12/12/18 0814   Gross per 24 hour   Intake              120 ml   Output                0 ml   Net              120 ml             Physical Exam:    GEN: Ekaterina Barclay appears well and alert  HEENT: pupils equal, round, and reactive to light; extraocular muscles intact  NECK: supple, no carotid bruits   HEART: regular rhythm, normal S1 and S2, 3/6 MILAN, No clicks, gallops or rubs   LUNGS: clear to auscultation bilaterally; no wheezes, rales, or rhonchi   ABDOMEN: normal bowel sounds, soft, no tenderness, no distention  EXTREMITIES: peripheral pulses normal; no clubbing, cyanosis, or edema      Medications:      Current Facility-Administered Medications:     acetaminophen (TYLENOL) tablet 650 mg, 650 mg, Oral, Q6H PRN, Lennie Atkins PA-C    calcium carbonate (OYSTER SHELL,OSCAL) 500 mg tablet 1 tablet, 1 tablet, Oral, Daily With Breakfast, Lennie Atkins PA-C, 1 tablet at 12/11/18 0859    ciprofloxacin (CIPRO) IVPB (premix) 400 mg, 400 mg, Intravenous, Q24H, Marino Atkins PA-C, Last Rate: 200 mL/hr at 12/11/18 1211, 400 mg at 12/11/18 1211    cyanocobalamin (VITAMIN B-12) tablet 1,000 mcg, 1,000 mcg, Oral, Daily, Lennie Atkins PA-C, 1,000 mcg at 12/11/18 0859    famotidine (PEPCID) tablet 20 mg, 20 mg, Oral, Daily, Lennie Atkins PA-C, 20 mg at 12/11/18 3279    ferrous sulfate tablet 325 mg, 325 mg, Oral, Daily With Breakfast, Lennie Atkins PA-C, 325 mg at 12/11/18 0859    glucagon (GLUCAGEN) injection 1 mg, 1 mg, Intravenous, PRN, Lennie Atkins PA-C    glucose chewable tablet 16 g, 16 g, Oral, PRN, Lennie Atkins PA-C    heparin (porcine) subcutaneous injection 5,000 Units, 5,000 Units, Subcutaneous, Q8H [address], 5,000 Units at 12/12/18 0517 **AND** Platelet count, , , Once, Mary Lou Smith PA-C    hydrALAZINE (APRESOLINE) tablet 25 mg, 25 mg, Oral, BID, Lennie Atkins PA-C, 25 mg at 12/11/18 1718    insulin detemir (LEVEMIR) subcutaneous injection 4 Units, 4 Units, Subcutaneous, HS, Lennie Atkins PA-C, 4 Units at 12/11/18 2139    insulin lispro (HumaLOG) 100 units/mL subcutaneous injection 1-5 Units, 1-5 Units, Subcutaneous, TID AC, Marino Atkins PA-C, 1 Units at 12/12/18 1010   ipratropium-albuterol (DUO-NEB) 0 5-2 5 mg/3 mL inhalation solution 3 mL, 3 mL, Nebulization, 4x Daily PRN, Ame Yan MD, 3 mL at 12/10/18 0517    isosorbide dinitrate (ISORDIL) tablet 10 mg, 10 mg, Oral, TID, Brendan Atkins PA-C, 10 mg at 12/11/18 2139    lactobacillus acidophilus-bulgaricus [facility]) packet 1 packet, 1 packet, Oral, TID With Meals, Ame Yan MD, 1 packet at 12/11/18 1617    magnesium oxide (MAG-OX) tablet 400 mg, 400 mg, Oral, Daily, ROBERT Torre-C, 400 mg at 12/11/18 0900    metoprolol tartrate (LOPRESSOR) tablet 25 mg, 25 mg, Oral, Q12H [address], ROBERT Tuttle-C, 25 mg at 12/11/18 2133    ondansetron (ZOFRAN) injection 4 mg, 4 mg, Intravenous, Q6H PRN, Brendan Atkins PA-C, 4 mg at 12/07/18 2044    polyvinyl alcohol (LIQUIFILM TEARS) 1 4 % ophthalmic solution 1 drop, 1 drop, Both Eyes, Q3H PRN, Brendan Atkins PA-C    sitaGLIPtin (JANUVIA) tablet 25 mg, 25 mg, Oral, Daily, Brendan Atkins PA-C, 25 mg at 12/11/18 0859    vancomycin (VANCOCIN) oral solution 125 mg, 125 mg, Oral, Q6H [address], Marino Atkins PA-C, 125 mg at 12/12/18 0521     Labs & Results:      Results from last 7 days  Lab Units 12/10/18  1204 12/10/18  0936 12/10/18  0559   TROPONIN I ng/mL 0 09* 0 08* 0 07*       Results from last 7 days  Lab Units 12/12/18  0514 12/11/18  1030 12/10/18  0559   WBC Thousand/uL 7 30 8 10 12 90*   HEMOGLOBIN g/dL 10 0* 10 9* 10 0*   HEMATOCRIT % 30 7* 34 0* 30 8*   PLATELETS Thousands/uL 222 216 209           Results from last 7 days  Lab Units 12/12/18  0514 12/11/18  1030 12/09/18  0532 12/08/18  0538 12/07/18  1743   POTASSIUM mmol/L 3 2* 3 1* 3 4* 3 5 3 5   CHLORIDE mmol/L 108* 106 112* 114* 108*   CO2 mmol/L 26 27 24 24 26   BUN mg/dL 23 23 30* 35* 41*   CREATININE mg/dL 2 57* 2 67* 2 46* 2 73* 2 82*   CALCIUM mg/dL 8 8 8 9 8 5* 8 1* 8 5*   ALK PHOS U/L  --  49*  --  45* 54*   ALT U/L  --  14  --  7 7   AST U/L  --  27  --  9* 12*       Results from last 7 days  Lab Units 12/07/18  0915   INR  1 08   PTT seconds 28       Results from last 7 days  Lab Units 12/08/18  0538   MAGNESIUM mg/dL 1 6*             Counseling / Coordination of Care  Total floor / unit time spent today 30 minutes  Greater than 50% of total time was spent with the patient and / or family counseling and / or coordination of care

## 2018-12-13 NOTE — H&P
Judy Olguin#  [id number] F  ADL:[id number]    BYU:[id number]  Adm Date: 12/12/2018 1520  3:20 PM   ATT PHY: Oscar Hancock, [address]         Chief Complaint:  Generalized weakness status post C diff colitis for short-term versus long-term rehabilitation    History of Presenting Illness: Cuco Cruz is a(n) 80y o  year old female who was admitted from the hospital following C diff colitis with diarrhea, dehydration and generalized weakness for short-term versus long-term rehabilitation  On examination at bedside, patient seems confused  Patient denied any acute problems      No Known Allergies    Current Facility-Administered Medications on File Prior to Encounter   Medication Dose Route Frequency Provider Last Rate Last Dose    [DISCONTINUED] acetaminophen (TYLENOL) tablet 650 mg  650 mg Oral Q6H PRN Ulisses Mccauley PA-C        [DISCONTINUED] calcium carbonate (OYSTER SHELL,OSCAL) 500 mg tablet 1 tablet  1 tablet Oral Daily With Breakfast Marino Atkins PA-C   1 tablet at 12/11/18 0859    [DISCONTINUED] cephalexin (KEFLEX) capsule 500 mg  500 mg Oral Q12H [facility] Oscar Hancock MD        [DISCONTINUED] ciprofloxacin (CIPRO) IVPB (premix) 400 mg  400 mg Intravenous Q24H Marino Atkins PA-C 200 mL/hr at 12/12/18 1210 400 mg at 12/12/18 1210    [DISCONTINUED] cyanocobalamin (VITAMIN B-12) tablet 1,000 mcg  1,000 mcg Oral Daily Marino Atkins PA-C   1,000 mcg at 12/11/18 0859    [DISCONTINUED] famotidine (PEPCID) tablet 20 mg  20 mg Oral Daily Marino Atkins PA-C   20 mg at 12/11/18 2372    [DISCONTINUED] ferrous sulfate tablet 325 mg  325 mg Oral Daily With Breakfast Marino Atkins PA-C   325 mg at 12/11/18 0859    [DISCONTINUED] glucagon (GLUCAGEN) injection 1 mg  1 mg Intravenous PRN Mendel Majestic Rockovits, PA-C        [DISCONTINUED] glucose chewable tablet 16 g  16 g Oral PRN Ulisses Mccauley PA-C        [DISCONTINUED] heparin (porcine) subcutaneous injection 5,000 Units  5,000 Units Subcutaneous [facility] Ron Renae PA-C   5,000 Units at 12/12/18 8650    [DISCONTINUED] hydrALAZINE (APRESOLINE) tablet 25 mg  25 mg Oral BID Marino Atkins PA-C   25 mg at 12/11/18 1718    [DISCONTINUED] insulin detemir (LEVEMIR) subcutaneous injection 4 Units  4 Units Subcutaneous HS Ron Renae PA-C   4 Units at 12/11/18 2139    [DISCONTINUED] insulin lispro (HumaLOG) 100 units/mL subcutaneous injection 1-5 Units  1-5 Units Subcutaneous TID AC Marino Atkins PA-C   2 Units at 12/12/18 1225    [DISCONTINUED] ipratropium-albuterol (DUO-NEB) 0 5-2 5 mg/3 mL inhalation solution 3 mL  3 mL Nebulization 4x Daily PRN Abhijit Naranjo MD   3 mL at 12/10/18 0517    [DISCONTINUED] isosorbide dinitrate (ISORDIL) tablet 10 mg  10 mg Oral TID Tunde Atkins PA-C   10 mg at 12/11/18 2139    [DISCONTINUED] lactobacillus acidophilus-bulgaricus [facility]) packet 1 packet  1 packet Oral TID With Meals Abhijit Naranjo MD   1 packet at 12/11/18 1617    [DISCONTINUED] magnesium oxide (MAG-OX) tablet 400 mg  400 mg Oral Daily Marino Atkins PA-C   400 mg at 12/11/18 0900    [DISCONTINUED] metoprolol tartrate (LOPRESSOR) tablet 25 mg  25 mg Oral Q12H [facility] Marino Atkins PA-C   25 mg at 12/11/18 2133    [DISCONTINUED] ondansetron (ZOFRAN) injection 4 mg  4 mg Intravenous Q6H PRN Marino Atkins PA-C   4 mg at 12/12/18 1218    [DISCONTINUED] polyvinyl alcohol (LIQUIFILM TEARS) 1 4 % ophthalmic solution 1 drop  1 drop Both Eyes Q3H PRN Tunde Atkins PA-C        [DISCONTINUED] sitaGLIPtin (JANUVIA) tablet 25 mg  25 mg Oral Daily Marino Atkins PA-C   25 mg at 12/11/18 0859    [DISCONTINUED] vancomycin (VANCOCIN) oral solution 125 mg  125 mg Oral Q6H [facility] Marino Atkins PA-C   125 mg at 12/12/18 4868     Current Outpatient Prescriptions on File Prior to Encounter   Medication Sig Dispense Refill    acetaminophen (TYLENOL) 650 mg CR tablet Take 650 mg by mouth every 6 (six) hours as needed for mild pain      atorvastatin (LIPITOR) 10 mg tablet Take 10 mg by mouth daily      calcium carbonate (CALCIUM 600) 600 MG tablet Take 600 mg by mouth daily      cephalexin (KEFLEX) 500 mg capsule Take 1 capsule (500 mg total) by mouth every 12 (twelve) hours for 8 doses 8 capsule 0    cyanocobalamin (VITAMIN B-12) 1,000 mcg tablet Take 1,000 mcg by mouth daily      docusate sodium (COLACE) 100 mg capsule Take 100 mg by mouth 2 (two) times a day as needed for constipation      ferrous sulfate 325 (65 Fe) mg tablet Take 325 mg by mouth daily with breakfast      glucose 4 g chewable tablet Chew 4 tablets (16 g total) as needed for low blood sugar (<70 and responsive/awake) 30 tablet 0    hydrALAZINE (APRESOLINE) 25 mg tablet Take 25 mg by mouth 2 (two) times a day      insulin detemir (LEVEMIR) 100 units/mL subcutaneous injection Inject under the skin daily at bedtime      ipratropium-albuterol (DUO-NEB) 0 5-2 5 mg/3 mL nebulizer solution Take 1 vial (3 mL total) by nebulization 4 (four) times a day as needed for wheezing for up to 30 days 60 vial 3    isosorbide dinitrate (ISORDIL) 10 mg tablet Take 10 mg by mouth 3 (three) times a day      lactobacillus acidophilus-bulgaricus (FLORANEX) packet Take 1 packet by mouth 3 (three) times a day with meals 30 packet 0    Linagliptin (TRADJENTA) 5 MG TABS Take 5 mg by mouth daily      Magnesium Chloride (MAG64 PO) Take 1 tablet by mouth daily      metoprolol tartrate (LOPRESSOR) 25 mg tablet Take 25 mg by mouth every 12 (twelve) hours      ondansetron (ZOFRAN) 4 mg tablet Take 4 mg by mouth every 6 (six) hours as needed for nausea or vomiting      ondansetron (ZOFRAN) 4 mg/2 mL injection Infuse 2 mL (4 mg total) into a venous catheter every 6 (six) hours as needed for nausea or vomiting 30 mL 0    pantoprazole (PROTONIX) 40 mg tablet Take 40 mg by mouth daily      polyethylene glycol (MIRALAX) 17 g packet Take 17 g by mouth daily      Polyvinyl Alcohol-Povidone (ARTIFICIAL TEARS) 5-6 MG/ML SOLN Apply 1 drop to eye 4 (four) times a day      vancomycin (VANCOCIN) 50mg/mL SOLN Take 2 5 mL (125 mg total) by mouth every 6 (six) hours for 13 doses 32 5 mL 0       Active Ambulatory Problems     Diagnosis Date Noted    Acute kidney injury ([facility] ) 12/07/2018    Gastroenteritis 12/07/2018    Nausea & vomiting 12/07/2018    Acute diarrhea 12/07/2018     Resolved Ambulatory Problems     Diagnosis Date Noted    No Resolved Ambulatory Problems     Past Medical History:   Diagnosis Date    Constipation     Diabetes mellitus ([facility] )     DJD (degenerative joint disease)     Gait abnormality     GERD (gastroesophageal reflux disease)     History of fall     Hypercholesterolemia     Hypertension     Iron deficiency anemia     Low back pain     Vitamin B12 deficiency        History reviewed  No pertinent surgical history  Social History:   Social History     Social History    Marital status: Single     Spouse name: N/A    Number of children: N/A    Years of education: N/A     Social History Main Topics    Smoking status: Unknown If Ever Smoked    Smokeless tobacco: Never Used    Alcohol use No    Drug use: No    Sexual activity: Not Asked     Other Topics Concern    None     Social History Narrative    None       Family History: History reviewed  No pertinent family history  Review of Systems   Musculoskeletal: Positive for arthralgias, back pain and gait problem  All other systems reviewed and are negative  Physical Exam   Constitutional: Awake and Alert  Well-developed and well-nourished  No distress  HENT: PERR,EOMI, conjunctiva normal  Head: Normocephalic and atraumatic  Mouth/Throat: Oropharynx is clear and moist     Eyes: Conjunctivae and EOM are normal  Pupils are equal, round, and reactive to light  Right and left eye exhibits no discharge  Neck: Neck supple  No tracheal deviation present   No thyromegaly present  Cardiovascular: Normal rate, regular rhythm and normal heart sounds  Exam reveals no friction rub  No murmur heard  Pulmonary/Chest: Effort normal and breath sounds normal  No respiratory distress  She has no wheezes  Abdominal: Soft  Bowel sounds are normal  She exhibits no distension  There is no tenderness  There is no rebound and no guarding  Neurological: Cranial Nerves grossly intact  No sensory deficit  Coordination normal    Musculoskeletal:   Nontender spine  Skin: Skin is warm and dry  No rash noted  No diaphoresis  No erythema  No edema  No cyanosis  Assessment   Mynor garcia(n) 80y o  year old female for short-term versus long-term rehabilitation with generalized weakness status post C diff colitis     1  Acute C diff colitis  Patient will be continued on vancomycin 125 mg p o  Every 6 hours for 9 more days  2  Acute UTI   patient is finishing up Keflex 500 mg 2 times daily for 4 more days  3  Generalized weakness with gait abnormality  Patient will receive extensive physical therapy and occupational therapy as a part of rehabilitation  4  Cardiac with history of Hypertension and Hyperlipidemia with a recent NSTEMI type 2 secondary to renal failure   Patient is on Hydralazine 25mg bid, Isosorbide dinitrate 10mg tid, Metoprolol tartrate 25mg bid  5  Type 2 Diabetes Mellitus along with diabetic nephropathy with Stage 2-3 chronic renal failure  Continue Levemir 6 units in the AM and 4 units in the PM along with Tradjenta 5mg once daily with low dose  insulin sliding scale     Patient's hemoglobin A1c was found to be 6 1  Patient is on Level 3 Carb Controlled Diet  Accu Checks ACHS  6  GERD  Continue Pepcid 20mg once daily  7  History of Anemia  Mild at this stage  Continue Ferrous Sulfate 325mg once daily  8  DJD/OA with low back pain  Tylenol as needed for pain  9  Osteopenia/osteoporosis  Calcium carbonate 600mg once daily    10  Dry Eyes  Artificial Tears as needed  Prognosis: Fair      Discharge Plan: In progress      Advanced Directives: I have discussed in detail the patient the advanced directives  The patient does have a POA and does not have a living will  First contact is listed as Diomedes Gaxiola, who can be reached at [phone]  I spoke with her, and she stated that she is the POA  She will attempt to bring in this paperwork for our charts   When discussing cardiac and pulmonary resuscitation efforts with the patient, the POA states that the patient has been quoted as saying "If I die, don't bring me back " Patient will be accordingly placed as a DNR/DNI      I have spent more than 50 minutes gathering data, doing physical examination, and discussing the advanced directives, which was witnessed by caring staff

## 2019-01-01 ENCOUNTER — HOSPITAL ENCOUNTER (INPATIENT)
Facility: HOSPITAL | Age: 84
LOS: 98 days | Discharge: NURSING, REHAB OR LONG-TERM CARE | DRG: 689 | End: 2019-05-23
Attending: FAMILY MEDICINE | Admitting: FAMILY MEDICINE
Payer: MEDICARE

## 2019-01-01 ENCOUNTER — HOSPITAL ENCOUNTER (INPATIENT)
Facility: HOSPITAL | Age: 84
LOS: 190 days | DRG: 189 | End: 2019-11-29
Attending: FAMILY MEDICINE | Admitting: FAMILY MEDICINE
Payer: MEDICARE

## 2019-01-01 ENCOUNTER — APPOINTMENT (EMERGENCY)
Dept: RADIOLOGY | Facility: HOSPITAL | Age: 84
DRG: 872 | End: 2019-01-01
Payer: MEDICARE

## 2019-01-01 ENCOUNTER — EPISODE CHANGES (OUTPATIENT)
Dept: CASE MANAGEMENT | Facility: HOSPITAL | Age: 84
End: 2019-01-01

## 2019-01-01 ENCOUNTER — PATIENT OUTREACH (OUTPATIENT)
Dept: CASE MANAGEMENT | Facility: OTHER | Age: 84
End: 2019-01-01

## 2019-01-01 ENCOUNTER — APPOINTMENT (INPATIENT)
Dept: ULTRASOUND IMAGING | Facility: HOSPITAL | Age: 84
DRG: 872 | End: 2019-01-01
Payer: MEDICARE

## 2019-01-01 ENCOUNTER — HOSPITAL ENCOUNTER (INPATIENT)
Facility: HOSPITAL | Age: 84
LOS: 6 days | Discharge: NURSING, REHAB OR LONG-TERM CARE | DRG: 872 | End: 2019-02-14
Attending: EMERGENCY MEDICINE | Admitting: INTERNAL MEDICINE
Payer: MEDICARE

## 2019-01-01 ENCOUNTER — APPOINTMENT (INPATIENT)
Dept: RADIOLOGY | Facility: HOSPITAL | Age: 84
DRG: 871 | End: 2019-01-01
Payer: MEDICARE

## 2019-01-01 ENCOUNTER — APPOINTMENT (EMERGENCY)
Dept: RADIOLOGY | Facility: HOSPITAL | Age: 84
DRG: 871 | End: 2019-01-01
Payer: MEDICARE

## 2019-01-01 ENCOUNTER — APPOINTMENT (EMERGENCY)
Dept: CT IMAGING | Facility: HOSPITAL | Age: 84
DRG: 872 | End: 2019-01-01
Payer: MEDICARE

## 2019-01-01 ENCOUNTER — HOSPITAL ENCOUNTER (INPATIENT)
Facility: HOSPITAL | Age: 84
LOS: 4 days | Discharge: NURSING, REHAB OR LONG-TERM CARE | DRG: 871 | End: 2019-05-12
Attending: INTERNAL MEDICINE | Admitting: FAMILY MEDICINE
Payer: MEDICARE

## 2019-01-01 VITALS
TEMPERATURE: 98.4 F | RESPIRATION RATE: 18 BRPM | HEART RATE: 78 BPM | SYSTOLIC BLOOD PRESSURE: 112 MMHG | DIASTOLIC BLOOD PRESSURE: 60 MMHG | WEIGHT: 128.53 LBS | BODY MASS INDEX: 23.65 KG/M2 | OXYGEN SATURATION: 98 % | HEIGHT: 62 IN

## 2019-01-01 VITALS
TEMPERATURE: 98.3 F | RESPIRATION RATE: 16 BRPM | WEIGHT: 122.58 LBS | DIASTOLIC BLOOD PRESSURE: 57 MMHG | HEIGHT: 62 IN | HEART RATE: 76 BPM | SYSTOLIC BLOOD PRESSURE: 114 MMHG | OXYGEN SATURATION: 97 % | BODY MASS INDEX: 22.56 KG/M2

## 2019-01-01 DIAGNOSIS — N17.9 ACUTE KIDNEY INJURY (HCC): ICD-10-CM

## 2019-01-01 DIAGNOSIS — K92.1 BLOOD IN STOOL: ICD-10-CM

## 2019-01-01 DIAGNOSIS — A04.72 CLOSTRIDIUM DIFFICILE DIARRHEA: ICD-10-CM

## 2019-01-01 DIAGNOSIS — E13.8 DIABETES MELLITUS OF OTHER TYPE WITH COMPLICATION, UNSPECIFIED WHETHER LONG TERM INSULIN USE: Primary | ICD-10-CM

## 2019-01-01 DIAGNOSIS — N18.9 CHRONIC KIDNEY DISEASE, UNSPECIFIED CKD STAGE: ICD-10-CM

## 2019-01-01 DIAGNOSIS — J81.1 PULMONARY EDEMA: ICD-10-CM

## 2019-01-01 DIAGNOSIS — D63.1 ANEMIA OF CHRONIC KIDNEY FAILURE: ICD-10-CM

## 2019-01-01 DIAGNOSIS — N39.0 COMPLICATED UTI (URINARY TRACT INFECTION): Primary | ICD-10-CM

## 2019-01-01 DIAGNOSIS — N39.0 COMPLICATED UTI (URINARY TRACT INFECTION): ICD-10-CM

## 2019-01-01 DIAGNOSIS — N18.9 CHRONIC KIDNEY DISEASE (CKD): ICD-10-CM

## 2019-01-01 DIAGNOSIS — N39.0 URINARY TRACT INFECTION: Primary | ICD-10-CM

## 2019-01-01 DIAGNOSIS — N18.9 ANEMIA OF CHRONIC KIDNEY FAILURE: ICD-10-CM

## 2019-01-01 DIAGNOSIS — R06.02 SHORTNESS OF BREATH: ICD-10-CM

## 2019-01-01 DIAGNOSIS — J18.9 PNEUMONIA: Primary | ICD-10-CM

## 2019-01-01 DIAGNOSIS — R30.0 BURNING WITH URINATION: ICD-10-CM

## 2019-01-01 DIAGNOSIS — D50.9 IRON DEFICIENCY ANEMIA: ICD-10-CM

## 2019-01-01 DIAGNOSIS — A04.72 C. DIFFICILE COLITIS: ICD-10-CM

## 2019-01-01 DIAGNOSIS — R50.9 ELEVATED TEMPERATURE: ICD-10-CM

## 2019-01-01 DIAGNOSIS — Z71.89 COMPLEX CARE COORDINATION: Primary | ICD-10-CM

## 2019-01-01 DIAGNOSIS — D64.9 ANEMIA: ICD-10-CM

## 2019-01-01 LAB
ABO GROUP BLD BPU: NORMAL
ABO GROUP BLD BPU: NORMAL
ABO GROUP BLD: NORMAL
ALBUMIN SERPL BCP-MCNC: 2.5 G/DL (ref 3.5–5.7)
ALBUMIN SERPL BCP-MCNC: 2.6 G/DL (ref 3.5–5.7)
ALBUMIN SERPL BCP-MCNC: 2.9 G/DL (ref 3.5–5.7)
ALBUMIN SERPL BCP-MCNC: 3 G/DL (ref 3.5–5.7)
ALBUMIN SERPL BCP-MCNC: 3.1 G/DL (ref 3.5–5.7)
ALBUMIN SERPL BCP-MCNC: 3.2 G/DL (ref 3.5–5.7)
ALBUMIN SERPL BCP-MCNC: 3.2 G/DL (ref 3.5–5.7)
ALBUMIN SERPL BCP-MCNC: 3.3 G/DL (ref 3.5–5.7)
ALP SERPL-CCNC: 48 U/L (ref 55–165)
ALP SERPL-CCNC: 52 U/L (ref 55–165)
ALP SERPL-CCNC: 55 U/L (ref 55–165)
ALP SERPL-CCNC: 55 U/L (ref 55–165)
ALP SERPL-CCNC: 56 U/L (ref 55–165)
ALP SERPL-CCNC: 57 U/L (ref 55–165)
ALP SERPL-CCNC: 57 U/L (ref 55–165)
ALP SERPL-CCNC: 60 U/L (ref 55–165)
ALT SERPL W P-5'-P-CCNC: 5 U/L (ref 7–52)
ALT SERPL W P-5'-P-CCNC: 5 U/L (ref 7–52)
ALT SERPL W P-5'-P-CCNC: 6 U/L (ref 7–52)
ALT SERPL W P-5'-P-CCNC: 7 U/L (ref 7–52)
ALT SERPL W P-5'-P-CCNC: 8 U/L (ref 7–52)
ANION GAP SERPL CALCULATED.3IONS-SCNC: 10 MMOL/L (ref 4–13)
ANION GAP SERPL CALCULATED.3IONS-SCNC: 12 MMOL/L (ref 4–13)
ANION GAP SERPL CALCULATED.3IONS-SCNC: 12 MMOL/L (ref 4–13)
ANION GAP SERPL CALCULATED.3IONS-SCNC: 5 MMOL/L (ref 4–13)
ANION GAP SERPL CALCULATED.3IONS-SCNC: 7 MMOL/L (ref 4–13)
ANION GAP SERPL CALCULATED.3IONS-SCNC: 7 MMOL/L (ref 4–13)
ANION GAP SERPL CALCULATED.3IONS-SCNC: 8 MMOL/L (ref 4–13)
ANION GAP SERPL CALCULATED.3IONS-SCNC: 9 MMOL/L (ref 4–13)
APTT PPP: 26 SECONDS (ref 26–38)
AST SERPL W P-5'-P-CCNC: 11 U/L (ref 13–39)
AST SERPL W P-5'-P-CCNC: 11 U/L (ref 13–39)
AST SERPL W P-5'-P-CCNC: 12 U/L (ref 13–39)
AST SERPL W P-5'-P-CCNC: 15 U/L (ref 13–39)
AST SERPL W P-5'-P-CCNC: 9 U/L (ref 13–39)
ATRIAL RATE: 108 BPM
ATRIAL RATE: 115 BPM
ATRIAL RATE: 87 BPM
BACTERIA BLD CULT: ABNORMAL
BACTERIA BLD CULT: ABNORMAL
BACTERIA BLD CULT: NORMAL
BACTERIA BLD CULT: NORMAL
BACTERIA UR CULT: ABNORMAL
BACTERIA UR CULT: NORMAL
BACTERIA UR QL AUTO: ABNORMAL /HPF
BASOPHILS # BLD AUTO: 0 THOUSANDS/ΜL (ref 0–0.1)
BASOPHILS # BLD AUTO: 0.1 THOUSANDS/ΜL (ref 0–0.1)
BASOPHILS NFR BLD AUTO: 0 % (ref 0–2)
BASOPHILS NFR BLD AUTO: 0 % (ref 0–2)
BASOPHILS NFR BLD AUTO: 1 % (ref 0–2)
BILIRUB SERPL-MCNC: 0.4 MG/DL (ref 0.2–1)
BILIRUB SERPL-MCNC: 0.4 MG/DL (ref 0.2–1)
BILIRUB SERPL-MCNC: 0.6 MG/DL (ref 0.2–1)
BILIRUB SERPL-MCNC: 0.6 MG/DL (ref 0.2–1)
BILIRUB SERPL-MCNC: 0.8 MG/DL (ref 0.2–1)
BILIRUB SERPL-MCNC: 0.9 MG/DL (ref 0.2–1)
BILIRUB SERPL-MCNC: 1 MG/DL (ref 0.2–1)
BILIRUB SERPL-MCNC: 1.4 MG/DL (ref 0.2–1)
BILIRUB UR QL STRIP: NEGATIVE
BLD GP AB SCN SERPL QL: NEGATIVE
BNP SERPL-MCNC: 1795 PG/ML (ref 1–100)
BPU ID: NORMAL
BPU ID: NORMAL
BUN SERPL-MCNC: 31 MG/DL (ref 7–25)
BUN SERPL-MCNC: 35 MG/DL (ref 7–25)
BUN SERPL-MCNC: 38 MG/DL (ref 7–25)
BUN SERPL-MCNC: 47 MG/DL (ref 7–25)
BUN SERPL-MCNC: 49 MG/DL (ref 7–25)
BUN SERPL-MCNC: 51 MG/DL (ref 7–25)
BUN SERPL-MCNC: 52 MG/DL (ref 7–25)
BUN SERPL-MCNC: 56 MG/DL (ref 7–25)
BUN SERPL-MCNC: 57 MG/DL (ref 7–25)
BUN SERPL-MCNC: 59 MG/DL (ref 7–25)
BUN SERPL-MCNC: 62 MG/DL (ref 7–25)
BUN SERPL-MCNC: 63 MG/DL (ref 7–25)
BUN SERPL-MCNC: 63 MG/DL (ref 7–25)
BUN SERPL-MCNC: 66 MG/DL (ref 7–25)
C DIFF TOX GENS STL QL NAA+PROBE: ABNORMAL
C DIFF TOX GENS STL QL NAA+PROBE: ABNORMAL
CALCIUM SERPL-MCNC: 8.5 MG/DL (ref 8.6–10.5)
CALCIUM SERPL-MCNC: 8.5 MG/DL (ref 8.6–10.5)
CALCIUM SERPL-MCNC: 8.6 MG/DL (ref 8.6–10.5)
CALCIUM SERPL-MCNC: 8.6 MG/DL (ref 8.6–10.5)
CALCIUM SERPL-MCNC: 8.8 MG/DL (ref 8.6–10.5)
CALCIUM SERPL-MCNC: 8.9 MG/DL (ref 8.6–10.5)
CALCIUM SERPL-MCNC: 9 MG/DL (ref 8.6–10.5)
CALCIUM SERPL-MCNC: 9.1 MG/DL (ref 8.6–10.5)
CALCIUM SERPL-MCNC: 9.3 MG/DL (ref 8.6–10.5)
CALCIUM SERPL-MCNC: 9.3 MG/DL (ref 8.6–10.5)
CALCIUM SERPL-MCNC: 9.5 MG/DL (ref 8.6–10.5)
CALCIUM SERPL-MCNC: 9.7 MG/DL (ref 8.6–10.5)
CALCIUM SERPL-MCNC: 9.8 MG/DL (ref 8.6–10.5)
CALCIUM SERPL-MCNC: 9.8 MG/DL (ref 8.6–10.5)
CHLORIDE SERPL-SCNC: 105 MMOL/L (ref 98–107)
CHLORIDE SERPL-SCNC: 107 MMOL/L (ref 98–107)
CHLORIDE SERPL-SCNC: 107 MMOL/L (ref 98–107)
CHLORIDE SERPL-SCNC: 108 MMOL/L (ref 98–107)
CHLORIDE SERPL-SCNC: 109 MMOL/L (ref 98–107)
CHLORIDE SERPL-SCNC: 110 MMOL/L (ref 98–107)
CHLORIDE SERPL-SCNC: 111 MMOL/L (ref 98–107)
CHLORIDE SERPL-SCNC: 111 MMOL/L (ref 98–107)
CHLORIDE SERPL-SCNC: 112 MMOL/L (ref 98–107)
CHLORIDE SERPL-SCNC: 115 MMOL/L (ref 98–107)
CLARITY UR: ABNORMAL
CLARITY UR: ABNORMAL
CLARITY UR: CLEAR
CO2 SERPL-SCNC: 21 MMOL/L (ref 21–31)
CO2 SERPL-SCNC: 23 MMOL/L (ref 21–31)
CO2 SERPL-SCNC: 24 MMOL/L (ref 21–31)
CO2 SERPL-SCNC: 25 MMOL/L (ref 21–31)
CO2 SERPL-SCNC: 26 MMOL/L (ref 21–31)
CO2 SERPL-SCNC: 27 MMOL/L (ref 21–31)
CO2 SERPL-SCNC: 27 MMOL/L (ref 21–31)
COLOR UR: YELLOW
CREAT SERPL-MCNC: 2.93 MG/DL (ref 0.6–1.2)
CREAT SERPL-MCNC: 3.05 MG/DL (ref 0.6–1.2)
CREAT SERPL-MCNC: 3.09 MG/DL (ref 0.6–1.2)
CREAT SERPL-MCNC: 3.12 MG/DL (ref 0.6–1.2)
CREAT SERPL-MCNC: 3.29 MG/DL (ref 0.6–1.2)
CREAT SERPL-MCNC: 3.42 MG/DL (ref 0.6–1.2)
CREAT SERPL-MCNC: 3.52 MG/DL (ref 0.6–1.2)
CREAT SERPL-MCNC: 3.63 MG/DL (ref 0.6–1.2)
CREAT SERPL-MCNC: 3.63 MG/DL (ref 0.6–1.2)
CREAT SERPL-MCNC: 3.66 MG/DL (ref 0.6–1.2)
CREAT SERPL-MCNC: 3.71 MG/DL (ref 0.6–1.2)
CREAT SERPL-MCNC: 3.97 MG/DL (ref 0.6–1.2)
CREAT SERPL-MCNC: 3.97 MG/DL (ref 0.6–1.2)
CREAT SERPL-MCNC: 4.02 MG/DL (ref 0.6–1.2)
CROSSMATCH: NORMAL
CROSSMATCH: NORMAL
EOSINOPHIL # BLD AUTO: 0 THOUSAND/ΜL (ref 0–0.61)
EOSINOPHIL # BLD AUTO: 0 THOUSAND/ΜL (ref 0–0.61)
EOSINOPHIL # BLD AUTO: 0.1 THOUSAND/ΜL (ref 0–0.61)
EOSINOPHIL # BLD AUTO: 0.1 THOUSAND/ΜL (ref 0–0.61)
EOSINOPHIL # BLD AUTO: 0.2 THOUSAND/ΜL (ref 0–0.61)
EOSINOPHIL # BLD AUTO: 0.2 THOUSAND/ΜL (ref 0–0.61)
EOSINOPHIL # BLD AUTO: 0.8 THOUSAND/ΜL (ref 0–0.61)
EOSINOPHIL NFR BLD AUTO: 0 % (ref 0–5)
EOSINOPHIL NFR BLD AUTO: 0 % (ref 0–5)
EOSINOPHIL NFR BLD AUTO: 1 % (ref 0–5)
EOSINOPHIL NFR BLD AUTO: 10 % (ref 0–5)
EOSINOPHIL NFR BLD AUTO: 2 % (ref 0–5)
EOSINOPHIL NFR BLD AUTO: 2 % (ref 0–5)
EOSINOPHIL NFR BLD AUTO: 4 % (ref 0–5)
ERYTHROCYTE [DISTWIDTH] IN BLOOD BY AUTOMATED COUNT: 15 % (ref 11.5–14.5)
ERYTHROCYTE [DISTWIDTH] IN BLOOD BY AUTOMATED COUNT: 15.2 % (ref 11.5–14.5)
ERYTHROCYTE [DISTWIDTH] IN BLOOD BY AUTOMATED COUNT: 15.3 % (ref 11.5–14.5)
ERYTHROCYTE [DISTWIDTH] IN BLOOD BY AUTOMATED COUNT: 15.6 % (ref 11.5–14.5)
ERYTHROCYTE [DISTWIDTH] IN BLOOD BY AUTOMATED COUNT: 15.7 % (ref 11.5–14.5)
ERYTHROCYTE [DISTWIDTH] IN BLOOD BY AUTOMATED COUNT: 16 % (ref 11.5–14.5)
ERYTHROCYTE [DISTWIDTH] IN BLOOD BY AUTOMATED COUNT: 18.3 % (ref 11.5–14.5)
ERYTHROCYTE [DISTWIDTH] IN BLOOD BY AUTOMATED COUNT: 18.5 % (ref 11.5–14.5)
ERYTHROCYTE [DISTWIDTH] IN BLOOD BY AUTOMATED COUNT: 19.8 % (ref 11.5–14.5)
EST. AVERAGE GLUCOSE BLD GHB EST-MCNC: 100 MG/DL
EST. AVERAGE GLUCOSE BLD GHB EST-MCNC: 120 MG/DL
EST. AVERAGE GLUCOSE BLD GHB EST-MCNC: 131 MG/DL
EST. AVERAGE GLUCOSE BLD GHB EST-MCNC: 134 MG/DL
EST. AVERAGE GLUCOSE BLD GHB EST-MCNC: 137 MG/DL
EST. AVERAGE GLUCOSE BLD GHB EST-MCNC: 137 MG/DL
EST. AVERAGE GLUCOSE BLD GHB EST-MCNC: 140 MG/DL
EST. AVERAGE GLUCOSE BLD GHB EST-MCNC: 143 MG/DL
EST. AVERAGE GLUCOSE BLD GHB EST-MCNC: 146 MG/DL
FOLATE SERPL-MCNC: >20 NG/ML (ref 3.1–17.5)
GFR SERPL CREATININE-BSD FRML MDRD: 10 ML/MIN/1.73SQ M
GFR SERPL CREATININE-BSD FRML MDRD: 11 ML/MIN/1.73SQ M
GFR SERPL CREATININE-BSD FRML MDRD: 11 ML/MIN/1.73SQ M
GFR SERPL CREATININE-BSD FRML MDRD: 12 ML/MIN/1.73SQ M
GFR SERPL CREATININE-BSD FRML MDRD: 12 ML/MIN/1.73SQ M
GFR SERPL CREATININE-BSD FRML MDRD: 13 ML/MIN/1.73SQ M
GFR SERPL CREATININE-BSD FRML MDRD: 9 ML/MIN/1.73SQ M
GLUCOSE P FAST SERPL-MCNC: 121 MG/DL (ref 65–99)
GLUCOSE P FAST SERPL-MCNC: 32 MG/DL (ref 65–99)
GLUCOSE P FAST SERPL-MCNC: 48 MG/DL (ref 65–99)
GLUCOSE P FAST SERPL-MCNC: 91 MG/DL (ref 65–99)
GLUCOSE P FAST SERPL-MCNC: 93 MG/DL (ref 65–99)
GLUCOSE SERPL-MCNC: 100 MG/DL (ref 65–140)
GLUCOSE SERPL-MCNC: 101 MG/DL (ref 65–140)
GLUCOSE SERPL-MCNC: 102 MG/DL (ref 65–140)
GLUCOSE SERPL-MCNC: 103 MG/DL (ref 65–140)
GLUCOSE SERPL-MCNC: 103 MG/DL (ref 65–140)
GLUCOSE SERPL-MCNC: 104 MG/DL (ref 65–140)
GLUCOSE SERPL-MCNC: 105 MG/DL (ref 65–140)
GLUCOSE SERPL-MCNC: 107 MG/DL (ref 65–140)
GLUCOSE SERPL-MCNC: 108 MG/DL (ref 65–140)
GLUCOSE SERPL-MCNC: 108 MG/DL (ref 65–140)
GLUCOSE SERPL-MCNC: 109 MG/DL (ref 65–140)
GLUCOSE SERPL-MCNC: 110 MG/DL (ref 65–140)
GLUCOSE SERPL-MCNC: 111 MG/DL (ref 65–140)
GLUCOSE SERPL-MCNC: 112 MG/DL (ref 65–140)
GLUCOSE SERPL-MCNC: 112 MG/DL (ref 65–140)
GLUCOSE SERPL-MCNC: 113 MG/DL (ref 65–140)
GLUCOSE SERPL-MCNC: 114 MG/DL (ref 65–140)
GLUCOSE SERPL-MCNC: 115 MG/DL (ref 65–140)
GLUCOSE SERPL-MCNC: 116 MG/DL (ref 65–140)
GLUCOSE SERPL-MCNC: 116 MG/DL (ref 65–140)
GLUCOSE SERPL-MCNC: 117 MG/DL (ref 65–140)
GLUCOSE SERPL-MCNC: 117 MG/DL (ref 65–140)
GLUCOSE SERPL-MCNC: 118 MG/DL (ref 65–140)
GLUCOSE SERPL-MCNC: 119 MG/DL (ref 65–140)
GLUCOSE SERPL-MCNC: 120 MG/DL (ref 65–140)
GLUCOSE SERPL-MCNC: 120 MG/DL (ref 65–140)
GLUCOSE SERPL-MCNC: 121 MG/DL (ref 65–140)
GLUCOSE SERPL-MCNC: 122 MG/DL (ref 65–140)
GLUCOSE SERPL-MCNC: 123 MG/DL (ref 65–99)
GLUCOSE SERPL-MCNC: 124 MG/DL (ref 65–140)
GLUCOSE SERPL-MCNC: 125 MG/DL (ref 65–140)
GLUCOSE SERPL-MCNC: 126 MG/DL (ref 65–140)
GLUCOSE SERPL-MCNC: 126 MG/DL (ref 65–140)
GLUCOSE SERPL-MCNC: 127 MG/DL (ref 65–140)
GLUCOSE SERPL-MCNC: 127 MG/DL (ref 65–140)
GLUCOSE SERPL-MCNC: 128 MG/DL (ref 65–140)
GLUCOSE SERPL-MCNC: 129 MG/DL (ref 65–140)
GLUCOSE SERPL-MCNC: 130 MG/DL (ref 65–140)
GLUCOSE SERPL-MCNC: 130 MG/DL (ref 65–140)
GLUCOSE SERPL-MCNC: 131 MG/DL (ref 65–140)
GLUCOSE SERPL-MCNC: 131 MG/DL (ref 65–140)
GLUCOSE SERPL-MCNC: 132 MG/DL (ref 65–140)
GLUCOSE SERPL-MCNC: 132 MG/DL (ref 65–140)
GLUCOSE SERPL-MCNC: 133 MG/DL (ref 65–140)
GLUCOSE SERPL-MCNC: 133 MG/DL (ref 65–140)
GLUCOSE SERPL-MCNC: 134 MG/DL (ref 65–140)
GLUCOSE SERPL-MCNC: 135 MG/DL (ref 65–140)
GLUCOSE SERPL-MCNC: 137 MG/DL (ref 65–140)
GLUCOSE SERPL-MCNC: 138 MG/DL (ref 65–140)
GLUCOSE SERPL-MCNC: 139 MG/DL (ref 65–140)
GLUCOSE SERPL-MCNC: 140 MG/DL (ref 65–140)
GLUCOSE SERPL-MCNC: 141 MG/DL (ref 65–140)
GLUCOSE SERPL-MCNC: 141 MG/DL (ref 65–140)
GLUCOSE SERPL-MCNC: 142 MG/DL (ref 65–140)
GLUCOSE SERPL-MCNC: 143 MG/DL (ref 65–140)
GLUCOSE SERPL-MCNC: 143 MG/DL (ref 65–140)
GLUCOSE SERPL-MCNC: 144 MG/DL (ref 65–140)
GLUCOSE SERPL-MCNC: 145 MG/DL (ref 65–140)
GLUCOSE SERPL-MCNC: 145 MG/DL (ref 65–140)
GLUCOSE SERPL-MCNC: 146 MG/DL (ref 65–140)
GLUCOSE SERPL-MCNC: 147 MG/DL (ref 65–140)
GLUCOSE SERPL-MCNC: 147 MG/DL (ref 65–140)
GLUCOSE SERPL-MCNC: 148 MG/DL (ref 65–140)
GLUCOSE SERPL-MCNC: 148 MG/DL (ref 65–140)
GLUCOSE SERPL-MCNC: 149 MG/DL (ref 65–140)
GLUCOSE SERPL-MCNC: 150 MG/DL (ref 65–140)
GLUCOSE SERPL-MCNC: 151 MG/DL (ref 65–140)
GLUCOSE SERPL-MCNC: 152 MG/DL (ref 65–140)
GLUCOSE SERPL-MCNC: 153 MG/DL (ref 65–140)
GLUCOSE SERPL-MCNC: 154 MG/DL (ref 65–140)
GLUCOSE SERPL-MCNC: 154 MG/DL (ref 65–140)
GLUCOSE SERPL-MCNC: 156 MG/DL (ref 65–140)
GLUCOSE SERPL-MCNC: 156 MG/DL (ref 65–140)
GLUCOSE SERPL-MCNC: 157 MG/DL (ref 65–140)
GLUCOSE SERPL-MCNC: 158 MG/DL (ref 65–140)
GLUCOSE SERPL-MCNC: 158 MG/DL (ref 65–140)
GLUCOSE SERPL-MCNC: 159 MG/DL (ref 65–140)
GLUCOSE SERPL-MCNC: 160 MG/DL (ref 65–140)
GLUCOSE SERPL-MCNC: 161 MG/DL (ref 65–140)
GLUCOSE SERPL-MCNC: 162 MG/DL (ref 65–140)
GLUCOSE SERPL-MCNC: 163 MG/DL (ref 65–140)
GLUCOSE SERPL-MCNC: 165 MG/DL (ref 65–140)
GLUCOSE SERPL-MCNC: 166 MG/DL (ref 65–140)
GLUCOSE SERPL-MCNC: 166 MG/DL (ref 65–140)
GLUCOSE SERPL-MCNC: 167 MG/DL (ref 65–140)
GLUCOSE SERPL-MCNC: 167 MG/DL (ref 65–140)
GLUCOSE SERPL-MCNC: 168 MG/DL (ref 65–140)
GLUCOSE SERPL-MCNC: 168 MG/DL (ref 65–140)
GLUCOSE SERPL-MCNC: 169 MG/DL (ref 65–140)
GLUCOSE SERPL-MCNC: 170 MG/DL (ref 65–140)
GLUCOSE SERPL-MCNC: 171 MG/DL (ref 65–140)
GLUCOSE SERPL-MCNC: 172 MG/DL (ref 65–140)
GLUCOSE SERPL-MCNC: 172 MG/DL (ref 65–140)
GLUCOSE SERPL-MCNC: 172 MG/DL (ref 65–99)
GLUCOSE SERPL-MCNC: 173 MG/DL (ref 65–140)
GLUCOSE SERPL-MCNC: 174 MG/DL (ref 65–140)
GLUCOSE SERPL-MCNC: 175 MG/DL (ref 65–140)
GLUCOSE SERPL-MCNC: 176 MG/DL (ref 65–140)
GLUCOSE SERPL-MCNC: 177 MG/DL (ref 65–140)
GLUCOSE SERPL-MCNC: 177 MG/DL (ref 65–140)
GLUCOSE SERPL-MCNC: 178 MG/DL (ref 65–140)
GLUCOSE SERPL-MCNC: 179 MG/DL (ref 65–140)
GLUCOSE SERPL-MCNC: 180 MG/DL (ref 65–140)
GLUCOSE SERPL-MCNC: 181 MG/DL (ref 65–140)
GLUCOSE SERPL-MCNC: 182 MG/DL (ref 65–140)
GLUCOSE SERPL-MCNC: 183 MG/DL (ref 65–140)
GLUCOSE SERPL-MCNC: 184 MG/DL (ref 65–140)
GLUCOSE SERPL-MCNC: 185 MG/DL (ref 65–140)
GLUCOSE SERPL-MCNC: 186 MG/DL (ref 65–140)
GLUCOSE SERPL-MCNC: 187 MG/DL (ref 65–140)
GLUCOSE SERPL-MCNC: 187 MG/DL (ref 65–140)
GLUCOSE SERPL-MCNC: 188 MG/DL (ref 65–140)
GLUCOSE SERPL-MCNC: 188 MG/DL (ref 65–140)
GLUCOSE SERPL-MCNC: 189 MG/DL (ref 65–140)
GLUCOSE SERPL-MCNC: 190 MG/DL (ref 65–140)
GLUCOSE SERPL-MCNC: 191 MG/DL (ref 65–140)
GLUCOSE SERPL-MCNC: 192 MG/DL (ref 65–140)
GLUCOSE SERPL-MCNC: 195 MG/DL (ref 65–140)
GLUCOSE SERPL-MCNC: 196 MG/DL (ref 65–140)
GLUCOSE SERPL-MCNC: 197 MG/DL (ref 65–140)
GLUCOSE SERPL-MCNC: 198 MG/DL (ref 65–140)
GLUCOSE SERPL-MCNC: 198 MG/DL (ref 65–140)
GLUCOSE SERPL-MCNC: 199 MG/DL (ref 65–140)
GLUCOSE SERPL-MCNC: 20 MG/DL (ref 65–140)
GLUCOSE SERPL-MCNC: 200 MG/DL (ref 65–99)
GLUCOSE SERPL-MCNC: 201 MG/DL (ref 65–140)
GLUCOSE SERPL-MCNC: 202 MG/DL (ref 65–140)
GLUCOSE SERPL-MCNC: 203 MG/DL (ref 65–140)
GLUCOSE SERPL-MCNC: 204 MG/DL (ref 65–140)
GLUCOSE SERPL-MCNC: 205 MG/DL (ref 65–140)
GLUCOSE SERPL-MCNC: 205 MG/DL (ref 65–140)
GLUCOSE SERPL-MCNC: 206 MG/DL (ref 65–140)
GLUCOSE SERPL-MCNC: 207 MG/DL (ref 65–140)
GLUCOSE SERPL-MCNC: 208 MG/DL (ref 65–140)
GLUCOSE SERPL-MCNC: 208 MG/DL (ref 65–140)
GLUCOSE SERPL-MCNC: 209 MG/DL (ref 65–140)
GLUCOSE SERPL-MCNC: 209 MG/DL (ref 65–140)
GLUCOSE SERPL-MCNC: 21 MG/DL (ref 65–140)
GLUCOSE SERPL-MCNC: 210 MG/DL (ref 65–140)
GLUCOSE SERPL-MCNC: 210 MG/DL (ref 65–140)
GLUCOSE SERPL-MCNC: 211 MG/DL (ref 65–140)
GLUCOSE SERPL-MCNC: 211 MG/DL (ref 65–140)
GLUCOSE SERPL-MCNC: 212 MG/DL (ref 65–140)
GLUCOSE SERPL-MCNC: 213 MG/DL (ref 65–140)
GLUCOSE SERPL-MCNC: 214 MG/DL (ref 65–140)
GLUCOSE SERPL-MCNC: 215 MG/DL (ref 65–140)
GLUCOSE SERPL-MCNC: 216 MG/DL (ref 65–140)
GLUCOSE SERPL-MCNC: 217 MG/DL (ref 65–140)
GLUCOSE SERPL-MCNC: 218 MG/DL (ref 65–140)
GLUCOSE SERPL-MCNC: 219 MG/DL (ref 65–140)
GLUCOSE SERPL-MCNC: 220 MG/DL (ref 65–140)
GLUCOSE SERPL-MCNC: 220 MG/DL (ref 65–140)
GLUCOSE SERPL-MCNC: 220 MG/DL (ref 65–99)
GLUCOSE SERPL-MCNC: 221 MG/DL (ref 65–140)
GLUCOSE SERPL-MCNC: 222 MG/DL (ref 65–140)
GLUCOSE SERPL-MCNC: 223 MG/DL (ref 65–140)
GLUCOSE SERPL-MCNC: 224 MG/DL (ref 65–140)
GLUCOSE SERPL-MCNC: 224 MG/DL (ref 65–140)
GLUCOSE SERPL-MCNC: 225 MG/DL (ref 65–140)
GLUCOSE SERPL-MCNC: 225 MG/DL (ref 65–99)
GLUCOSE SERPL-MCNC: 226 MG/DL (ref 65–140)
GLUCOSE SERPL-MCNC: 228 MG/DL (ref 65–140)
GLUCOSE SERPL-MCNC: 229 MG/DL (ref 65–140)
GLUCOSE SERPL-MCNC: 229 MG/DL (ref 65–140)
GLUCOSE SERPL-MCNC: 230 MG/DL (ref 65–140)
GLUCOSE SERPL-MCNC: 231 MG/DL (ref 65–140)
GLUCOSE SERPL-MCNC: 232 MG/DL (ref 65–140)
GLUCOSE SERPL-MCNC: 232 MG/DL (ref 65–140)
GLUCOSE SERPL-MCNC: 233 MG/DL (ref 65–140)
GLUCOSE SERPL-MCNC: 236 MG/DL (ref 65–140)
GLUCOSE SERPL-MCNC: 237 MG/DL (ref 65–140)
GLUCOSE SERPL-MCNC: 239 MG/DL (ref 65–140)
GLUCOSE SERPL-MCNC: 240 MG/DL (ref 65–140)
GLUCOSE SERPL-MCNC: 241 MG/DL (ref 65–140)
GLUCOSE SERPL-MCNC: 242 MG/DL (ref 65–140)
GLUCOSE SERPL-MCNC: 244 MG/DL (ref 65–140)
GLUCOSE SERPL-MCNC: 245 MG/DL (ref 65–140)
GLUCOSE SERPL-MCNC: 246 MG/DL (ref 65–140)
GLUCOSE SERPL-MCNC: 246 MG/DL (ref 65–140)
GLUCOSE SERPL-MCNC: 247 MG/DL (ref 65–140)
GLUCOSE SERPL-MCNC: 248 MG/DL (ref 65–140)
GLUCOSE SERPL-MCNC: 249 MG/DL (ref 65–140)
GLUCOSE SERPL-MCNC: 249 MG/DL (ref 65–140)
GLUCOSE SERPL-MCNC: 250 MG/DL (ref 65–140)
GLUCOSE SERPL-MCNC: 251 MG/DL (ref 65–140)
GLUCOSE SERPL-MCNC: 251 MG/DL (ref 65–140)
GLUCOSE SERPL-MCNC: 252 MG/DL (ref 65–140)
GLUCOSE SERPL-MCNC: 252 MG/DL (ref 65–140)
GLUCOSE SERPL-MCNC: 254 MG/DL (ref 65–140)
GLUCOSE SERPL-MCNC: 255 MG/DL (ref 65–140)
GLUCOSE SERPL-MCNC: 256 MG/DL (ref 65–140)
GLUCOSE SERPL-MCNC: 257 MG/DL (ref 65–140)
GLUCOSE SERPL-MCNC: 258 MG/DL (ref 65–140)
GLUCOSE SERPL-MCNC: 259 MG/DL (ref 65–140)
GLUCOSE SERPL-MCNC: 259 MG/DL (ref 65–140)
GLUCOSE SERPL-MCNC: 260 MG/DL (ref 65–140)
GLUCOSE SERPL-MCNC: 260 MG/DL (ref 65–140)
GLUCOSE SERPL-MCNC: 261 MG/DL (ref 65–140)
GLUCOSE SERPL-MCNC: 261 MG/DL (ref 65–140)
GLUCOSE SERPL-MCNC: 262 MG/DL (ref 65–140)
GLUCOSE SERPL-MCNC: 264 MG/DL (ref 65–140)
GLUCOSE SERPL-MCNC: 265 MG/DL (ref 65–140)
GLUCOSE SERPL-MCNC: 266 MG/DL (ref 65–140)
GLUCOSE SERPL-MCNC: 267 MG/DL (ref 65–140)
GLUCOSE SERPL-MCNC: 268 MG/DL (ref 65–140)
GLUCOSE SERPL-MCNC: 268 MG/DL (ref 65–99)
GLUCOSE SERPL-MCNC: 269 MG/DL (ref 65–140)
GLUCOSE SERPL-MCNC: 269 MG/DL (ref 65–140)
GLUCOSE SERPL-MCNC: 270 MG/DL (ref 65–140)
GLUCOSE SERPL-MCNC: 271 MG/DL (ref 65–140)
GLUCOSE SERPL-MCNC: 273 MG/DL (ref 65–140)
GLUCOSE SERPL-MCNC: 273 MG/DL (ref 65–140)
GLUCOSE SERPL-MCNC: 274 MG/DL (ref 65–140)
GLUCOSE SERPL-MCNC: 276 MG/DL (ref 65–140)
GLUCOSE SERPL-MCNC: 277 MG/DL (ref 65–140)
GLUCOSE SERPL-MCNC: 278 MG/DL (ref 65–140)
GLUCOSE SERPL-MCNC: 279 MG/DL (ref 65–140)
GLUCOSE SERPL-MCNC: 279 MG/DL (ref 65–140)
GLUCOSE SERPL-MCNC: 281 MG/DL (ref 65–140)
GLUCOSE SERPL-MCNC: 281 MG/DL (ref 65–140)
GLUCOSE SERPL-MCNC: 284 MG/DL (ref 65–140)
GLUCOSE SERPL-MCNC: 285 MG/DL (ref 65–140)
GLUCOSE SERPL-MCNC: 286 MG/DL (ref 65–140)
GLUCOSE SERPL-MCNC: 287 MG/DL (ref 65–140)
GLUCOSE SERPL-MCNC: 287 MG/DL (ref 65–140)
GLUCOSE SERPL-MCNC: 288 MG/DL (ref 65–140)
GLUCOSE SERPL-MCNC: 289 MG/DL (ref 65–140)
GLUCOSE SERPL-MCNC: 290 MG/DL (ref 65–140)
GLUCOSE SERPL-MCNC: 291 MG/DL (ref 65–140)
GLUCOSE SERPL-MCNC: 293 MG/DL (ref 65–140)
GLUCOSE SERPL-MCNC: 294 MG/DL (ref 65–140)
GLUCOSE SERPL-MCNC: 295 MG/DL (ref 65–140)
GLUCOSE SERPL-MCNC: 295 MG/DL (ref 65–140)
GLUCOSE SERPL-MCNC: 297 MG/DL (ref 65–140)
GLUCOSE SERPL-MCNC: 297 MG/DL (ref 65–140)
GLUCOSE SERPL-MCNC: 298 MG/DL (ref 65–140)
GLUCOSE SERPL-MCNC: 299 MG/DL (ref 65–140)
GLUCOSE SERPL-MCNC: 30 MG/DL (ref 65–140)
GLUCOSE SERPL-MCNC: 301 MG/DL (ref 65–140)
GLUCOSE SERPL-MCNC: 302 MG/DL (ref 65–140)
GLUCOSE SERPL-MCNC: 302 MG/DL (ref 65–140)
GLUCOSE SERPL-MCNC: 303 MG/DL (ref 65–140)
GLUCOSE SERPL-MCNC: 304 MG/DL (ref 65–140)
GLUCOSE SERPL-MCNC: 309 MG/DL (ref 65–140)
GLUCOSE SERPL-MCNC: 309 MG/DL (ref 65–140)
GLUCOSE SERPL-MCNC: 31 MG/DL (ref 65–140)
GLUCOSE SERPL-MCNC: 310 MG/DL (ref 65–140)
GLUCOSE SERPL-MCNC: 311 MG/DL (ref 65–140)
GLUCOSE SERPL-MCNC: 312 MG/DL (ref 65–140)
GLUCOSE SERPL-MCNC: 313 MG/DL (ref 65–140)
GLUCOSE SERPL-MCNC: 314 MG/DL (ref 65–140)
GLUCOSE SERPL-MCNC: 315 MG/DL (ref 65–140)
GLUCOSE SERPL-MCNC: 315 MG/DL (ref 65–140)
GLUCOSE SERPL-MCNC: 316 MG/DL (ref 65–140)
GLUCOSE SERPL-MCNC: 316 MG/DL (ref 65–140)
GLUCOSE SERPL-MCNC: 317 MG/DL (ref 65–140)
GLUCOSE SERPL-MCNC: 317 MG/DL (ref 65–140)
GLUCOSE SERPL-MCNC: 318 MG/DL (ref 65–140)
GLUCOSE SERPL-MCNC: 319 MG/DL (ref 65–140)
GLUCOSE SERPL-MCNC: 321 MG/DL (ref 65–140)
GLUCOSE SERPL-MCNC: 322 MG/DL (ref 65–140)
GLUCOSE SERPL-MCNC: 323 MG/DL (ref 65–140)
GLUCOSE SERPL-MCNC: 324 MG/DL (ref 65–140)
GLUCOSE SERPL-MCNC: 326 MG/DL (ref 65–140)
GLUCOSE SERPL-MCNC: 327 MG/DL (ref 65–140)
GLUCOSE SERPL-MCNC: 327 MG/DL (ref 65–140)
GLUCOSE SERPL-MCNC: 328 MG/DL (ref 65–140)
GLUCOSE SERPL-MCNC: 33 MG/DL (ref 65–140)
GLUCOSE SERPL-MCNC: 330 MG/DL (ref 65–140)
GLUCOSE SERPL-MCNC: 330 MG/DL (ref 65–140)
GLUCOSE SERPL-MCNC: 331 MG/DL (ref 65–140)
GLUCOSE SERPL-MCNC: 331 MG/DL (ref 65–140)
GLUCOSE SERPL-MCNC: 332 MG/DL (ref 65–140)
GLUCOSE SERPL-MCNC: 335 MG/DL (ref 65–140)
GLUCOSE SERPL-MCNC: 336 MG/DL (ref 65–140)
GLUCOSE SERPL-MCNC: 337 MG/DL (ref 65–140)
GLUCOSE SERPL-MCNC: 340 MG/DL (ref 65–140)
GLUCOSE SERPL-MCNC: 343 MG/DL (ref 65–140)
GLUCOSE SERPL-MCNC: 344 MG/DL (ref 65–140)
GLUCOSE SERPL-MCNC: 344 MG/DL (ref 65–140)
GLUCOSE SERPL-MCNC: 345 MG/DL (ref 65–140)
GLUCOSE SERPL-MCNC: 346 MG/DL (ref 65–140)
GLUCOSE SERPL-MCNC: 349 MG/DL (ref 65–140)
GLUCOSE SERPL-MCNC: 350 MG/DL (ref 65–140)
GLUCOSE SERPL-MCNC: 352 MG/DL (ref 65–140)
GLUCOSE SERPL-MCNC: 354 MG/DL (ref 65–140)
GLUCOSE SERPL-MCNC: 354 MG/DL (ref 65–140)
GLUCOSE SERPL-MCNC: 356 MG/DL (ref 65–140)
GLUCOSE SERPL-MCNC: 357 MG/DL (ref 65–140)
GLUCOSE SERPL-MCNC: 359 MG/DL (ref 65–140)
GLUCOSE SERPL-MCNC: 359 MG/DL (ref 65–140)
GLUCOSE SERPL-MCNC: 360 MG/DL (ref 65–140)
GLUCOSE SERPL-MCNC: 362 MG/DL (ref 65–140)
GLUCOSE SERPL-MCNC: 363 MG/DL (ref 65–140)
GLUCOSE SERPL-MCNC: 368 MG/DL (ref 65–140)
GLUCOSE SERPL-MCNC: 368 MG/DL (ref 65–140)
GLUCOSE SERPL-MCNC: 370 MG/DL (ref 65–140)
GLUCOSE SERPL-MCNC: 371 MG/DL (ref 65–140)
GLUCOSE SERPL-MCNC: 372 MG/DL (ref 65–140)
GLUCOSE SERPL-MCNC: 373 MG/DL (ref 65–140)
GLUCOSE SERPL-MCNC: 373 MG/DL (ref 65–140)
GLUCOSE SERPL-MCNC: 375 MG/DL (ref 65–140)
GLUCOSE SERPL-MCNC: 378 MG/DL (ref 65–140)
GLUCOSE SERPL-MCNC: 379 MG/DL (ref 65–140)
GLUCOSE SERPL-MCNC: 38 MG/DL (ref 65–140)
GLUCOSE SERPL-MCNC: 38 MG/DL (ref 65–140)
GLUCOSE SERPL-MCNC: 384 MG/DL (ref 65–140)
GLUCOSE SERPL-MCNC: 384 MG/DL (ref 65–140)
GLUCOSE SERPL-MCNC: 387 MG/DL (ref 65–140)
GLUCOSE SERPL-MCNC: 39 MG/DL (ref 65–140)
GLUCOSE SERPL-MCNC: 390 MG/DL (ref 65–140)
GLUCOSE SERPL-MCNC: 392 MG/DL (ref 65–140)
GLUCOSE SERPL-MCNC: 397 MG/DL (ref 65–140)
GLUCOSE SERPL-MCNC: 403 MG/DL (ref 65–140)
GLUCOSE SERPL-MCNC: 403 MG/DL (ref 65–140)
GLUCOSE SERPL-MCNC: 41 MG/DL (ref 65–140)
GLUCOSE SERPL-MCNC: 41 MG/DL (ref 65–140)
GLUCOSE SERPL-MCNC: 419 MG/DL (ref 65–140)
GLUCOSE SERPL-MCNC: 420 MG/DL (ref 65–140)
GLUCOSE SERPL-MCNC: 428 MG/DL (ref 65–140)
GLUCOSE SERPL-MCNC: 435 MG/DL (ref 65–140)
GLUCOSE SERPL-MCNC: 436 MG/DL (ref 65–140)
GLUCOSE SERPL-MCNC: 438 MG/DL (ref 65–140)
GLUCOSE SERPL-MCNC: 44 MG/DL (ref 65–140)
GLUCOSE SERPL-MCNC: 441 MG/DL (ref 65–140)
GLUCOSE SERPL-MCNC: 445 MG/DL (ref 65–140)
GLUCOSE SERPL-MCNC: 447 MG/DL (ref 65–140)
GLUCOSE SERPL-MCNC: 45 MG/DL (ref 65–140)
GLUCOSE SERPL-MCNC: 459 MG/DL (ref 65–140)
GLUCOSE SERPL-MCNC: 46 MG/DL (ref 65–140)
GLUCOSE SERPL-MCNC: 46 MG/DL (ref 65–140)
GLUCOSE SERPL-MCNC: 48 MG/DL (ref 65–140)
GLUCOSE SERPL-MCNC: 489 MG/DL (ref 65–140)
GLUCOSE SERPL-MCNC: 49 MG/DL (ref 65–140)
GLUCOSE SERPL-MCNC: 498 MG/DL (ref 65–140)
GLUCOSE SERPL-MCNC: 51 MG/DL (ref 65–140)
GLUCOSE SERPL-MCNC: 52 MG/DL (ref 65–140)
GLUCOSE SERPL-MCNC: 53 MG/DL (ref 65–140)
GLUCOSE SERPL-MCNC: 53 MG/DL (ref 65–140)
GLUCOSE SERPL-MCNC: 54 MG/DL (ref 65–140)
GLUCOSE SERPL-MCNC: 55 MG/DL (ref 65–140)
GLUCOSE SERPL-MCNC: 56 MG/DL (ref 65–140)
GLUCOSE SERPL-MCNC: 59 MG/DL (ref 65–140)
GLUCOSE SERPL-MCNC: 59 MG/DL (ref 65–140)
GLUCOSE SERPL-MCNC: 60 MG/DL (ref 65–140)
GLUCOSE SERPL-MCNC: 61 MG/DL (ref 65–140)
GLUCOSE SERPL-MCNC: 61 MG/DL (ref 65–140)
GLUCOSE SERPL-MCNC: 64 MG/DL (ref 65–140)
GLUCOSE SERPL-MCNC: 65 MG/DL (ref 65–140)
GLUCOSE SERPL-MCNC: 66 MG/DL (ref 65–140)
GLUCOSE SERPL-MCNC: 66 MG/DL (ref 65–140)
GLUCOSE SERPL-MCNC: 67 MG/DL (ref 65–140)
GLUCOSE SERPL-MCNC: 68 MG/DL (ref 65–140)
GLUCOSE SERPL-MCNC: 68 MG/DL (ref 65–140)
GLUCOSE SERPL-MCNC: 69 MG/DL (ref 65–140)
GLUCOSE SERPL-MCNC: 70 MG/DL (ref 65–140)
GLUCOSE SERPL-MCNC: 71 MG/DL (ref 65–140)
GLUCOSE SERPL-MCNC: 72 MG/DL (ref 65–140)
GLUCOSE SERPL-MCNC: 72 MG/DL (ref 65–140)
GLUCOSE SERPL-MCNC: 73 MG/DL (ref 65–140)
GLUCOSE SERPL-MCNC: 73 MG/DL (ref 65–140)
GLUCOSE SERPL-MCNC: 74 MG/DL (ref 65–140)
GLUCOSE SERPL-MCNC: 74 MG/DL (ref 65–99)
GLUCOSE SERPL-MCNC: 74 MG/DL (ref 65–99)
GLUCOSE SERPL-MCNC: 75 MG/DL (ref 65–140)
GLUCOSE SERPL-MCNC: 76 MG/DL (ref 65–140)
GLUCOSE SERPL-MCNC: 78 MG/DL (ref 65–140)
GLUCOSE SERPL-MCNC: 78 MG/DL (ref 65–140)
GLUCOSE SERPL-MCNC: 80 MG/DL (ref 65–140)
GLUCOSE SERPL-MCNC: 82 MG/DL (ref 65–140)
GLUCOSE SERPL-MCNC: 82 MG/DL (ref 65–99)
GLUCOSE SERPL-MCNC: 83 MG/DL (ref 65–140)
GLUCOSE SERPL-MCNC: 83 MG/DL (ref 65–99)
GLUCOSE SERPL-MCNC: 84 MG/DL (ref 65–140)
GLUCOSE SERPL-MCNC: 84 MG/DL (ref 65–140)
GLUCOSE SERPL-MCNC: 85 MG/DL (ref 65–140)
GLUCOSE SERPL-MCNC: 85 MG/DL (ref 65–140)
GLUCOSE SERPL-MCNC: 86 MG/DL (ref 65–140)
GLUCOSE SERPL-MCNC: 86 MG/DL (ref 65–99)
GLUCOSE SERPL-MCNC: 88 MG/DL (ref 65–140)
GLUCOSE SERPL-MCNC: 89 MG/DL (ref 65–140)
GLUCOSE SERPL-MCNC: 90 MG/DL (ref 65–140)
GLUCOSE SERPL-MCNC: 91 MG/DL (ref 65–140)
GLUCOSE SERPL-MCNC: 92 MG/DL (ref 65–140)
GLUCOSE SERPL-MCNC: 93 MG/DL (ref 65–140)
GLUCOSE SERPL-MCNC: 93 MG/DL (ref 65–99)
GLUCOSE SERPL-MCNC: 94 MG/DL (ref 65–140)
GLUCOSE SERPL-MCNC: 94 MG/DL (ref 65–99)
GLUCOSE SERPL-MCNC: 95 MG/DL (ref 65–140)
GLUCOSE SERPL-MCNC: 96 MG/DL (ref 65–140)
GLUCOSE SERPL-MCNC: 97 MG/DL (ref 65–140)
GLUCOSE SERPL-MCNC: 98 MG/DL (ref 65–140)
GLUCOSE SERPL-MCNC: 98 MG/DL (ref 65–140)
GLUCOSE SERPL-MCNC: 98 MG/DL (ref 65–99)
GLUCOSE SERPL-MCNC: 99 MG/DL (ref 65–140)
GLUCOSE SERPL-MCNC: >500 MG/DL (ref 65–140)
GLUCOSE UR STRIP-MCNC: ABNORMAL MG/DL
GLUCOSE UR STRIP-MCNC: NEGATIVE MG/DL
GLUCOSE UR STRIP-MCNC: NEGATIVE MG/DL
GRAM STN SPEC: ABNORMAL
GRAM STN SPEC: ABNORMAL
HBA1C MFR BLD: 5.1 % (ref 4.2–6.3)
HBA1C MFR BLD: 5.8 % (ref 4.2–6.3)
HBA1C MFR BLD: 6.2 % (ref 4.2–6.3)
HBA1C MFR BLD: 6.3 % (ref 4.2–6.3)
HBA1C MFR BLD: 6.4 % (ref 4.2–6.3)
HBA1C MFR BLD: 6.4 % (ref 4.2–6.3)
HBA1C MFR BLD: 6.5 % (ref 4.2–6.3)
HBA1C MFR BLD: 6.6 % (ref 4.2–6.3)
HBA1C MFR BLD: 6.7 % (ref 4.2–6.3)
HCT VFR BLD AUTO: 21.4 % (ref 42–47)
HCT VFR BLD AUTO: 22.6 % (ref 42–47)
HCT VFR BLD AUTO: 23.9 % (ref 42–47)
HCT VFR BLD AUTO: 25.6 % (ref 42–47)
HCT VFR BLD AUTO: 27.8 % (ref 34.8–46.1)
HCT VFR BLD AUTO: 29 % (ref 42–47)
HCT VFR BLD AUTO: 29.5 % (ref 42–47)
HCT VFR BLD AUTO: 29.6 % (ref 42–47)
HCT VFR BLD AUTO: 29.9 % (ref 42–47)
HCT VFR BLD AUTO: 32.6 % (ref 34.8–46.1)
HEMOCCULT STL QL: NEGATIVE
HGB BLD-MCNC: 10 G/DL (ref 12–16)
HGB BLD-MCNC: 10.4 G/DL (ref 12–16)
HGB BLD-MCNC: 11.3 G/DL (ref 12–16)
HGB BLD-MCNC: 7.2 G/DL (ref 12–16)
HGB BLD-MCNC: 7.4 G/DL (ref 12–16)
HGB BLD-MCNC: 7.9 G/DL (ref 12–16)
HGB BLD-MCNC: 8 G/DL (ref 12–16)
HGB BLD-MCNC: 8.5 G/DL (ref 12–16)
HGB BLD-MCNC: 9.2 G/DL (ref 12–16)
HGB BLD-MCNC: 9.6 G/DL (ref 12–16)
HGB BLD-MCNC: 9.7 G/DL (ref 12–16)
HGB BLD-MCNC: 9.9 G/DL (ref 12–16)
HGB UR QL STRIP.AUTO: ABNORMAL
HGB UR QL STRIP.AUTO: ABNORMAL
HGB UR QL STRIP.AUTO: NEGATIVE
INR PPP: 1.04 (ref 0.9–1.5)
IRON SERPL-MCNC: 25 UG/DL (ref 50–170)
IRON SERPL-MCNC: 38 UG/DL (ref 50–170)
KETONES UR STRIP-MCNC: NEGATIVE MG/DL
LACTATE SERPL-SCNC: 1.5 MMOL/L (ref 0.5–2)
LACTATE SERPL-SCNC: 1.5 MMOL/L (ref 0.5–2)
LACTATE SERPL-SCNC: 1.9 MMOL/L (ref 0.5–2)
LEUKOCYTE ESTERASE UR QL STRIP: ABNORMAL
LEUKOCYTE ESTERASE UR QL STRIP: ABNORMAL
LEUKOCYTE ESTERASE UR QL STRIP: NEGATIVE
LYMPHOCYTES # BLD AUTO: 0.9 THOUSANDS/ΜL (ref 0.6–4.47)
LYMPHOCYTES # BLD AUTO: 0.9 THOUSANDS/ΜL (ref 0.6–4.47)
LYMPHOCYTES # BLD AUTO: 1.1 THOUSANDS/ΜL (ref 0.6–4.47)
LYMPHOCYTES # BLD AUTO: 1.6 THOUSANDS/ΜL (ref 0.6–4.47)
LYMPHOCYTES # BLD AUTO: 1.9 THOUSANDS/ΜL (ref 0.6–4.47)
LYMPHOCYTES # BLD AUTO: 16 % (ref 20–51)
LYMPHOCYTES # BLD AUTO: 2.5 THOUSANDS/ΜL (ref 0.6–4.47)
LYMPHOCYTES # BLD AUTO: 2.9 THOUSANDS/ΜL (ref 0.6–4.47)
LYMPHOCYTES # BLD AUTO: 3.5 THOUSAND/UL (ref 0.6–4.47)
LYMPHOCYTES NFR BLD AUTO: 14 % (ref 21–51)
LYMPHOCYTES NFR BLD AUTO: 16 % (ref 21–51)
LYMPHOCYTES NFR BLD AUTO: 25 % (ref 21–51)
LYMPHOCYTES NFR BLD AUTO: 29 % (ref 21–51)
LYMPHOCYTES NFR BLD AUTO: 36 % (ref 21–51)
LYMPHOCYTES NFR BLD AUTO: 5 % (ref 21–51)
LYMPHOCYTES NFR BLD AUTO: 8 % (ref 21–51)
MAGNESIUM SERPL-MCNC: 1.9 MG/DL (ref 1.9–2.7)
MAGNESIUM SERPL-MCNC: 2.2 MG/DL (ref 1.9–2.7)
MCH RBC QN AUTO: 28.7 PG (ref 26–34)
MCH RBC QN AUTO: 28.8 PG (ref 26–34)
MCH RBC QN AUTO: 29.1 PG (ref 26–34)
MCH RBC QN AUTO: 29.2 PG (ref 26–34)
MCH RBC QN AUTO: 29.3 PG (ref 26–34)
MCH RBC QN AUTO: 29.5 PG (ref 26–34)
MCH RBC QN AUTO: 29.6 PG (ref 26–34)
MCH RBC QN AUTO: 29.7 PG (ref 26–34)
MCH RBC QN AUTO: 29.8 PG (ref 26–34)
MCHC RBC AUTO-ENTMCNC: 32 G/DL (ref 31–37)
MCHC RBC AUTO-ENTMCNC: 32.4 G/DL (ref 31–37)
MCHC RBC AUTO-ENTMCNC: 32.5 G/DL (ref 31–37)
MCHC RBC AUTO-ENTMCNC: 32.9 G/DL (ref 31–37)
MCHC RBC AUTO-ENTMCNC: 33.1 G/DL (ref 31–37)
MCHC RBC AUTO-ENTMCNC: 33.2 G/DL (ref 31–37)
MCHC RBC AUTO-ENTMCNC: 33.4 G/DL (ref 31–37)
MCHC RBC AUTO-ENTMCNC: 33.5 G/DL (ref 31–37)
MCHC RBC AUTO-ENTMCNC: 33.6 G/DL (ref 31–37)
MCV RBC AUTO: 87 FL (ref 81–99)
MCV RBC AUTO: 87 FL (ref 81–99)
MCV RBC AUTO: 88 FL (ref 81–99)
MCV RBC AUTO: 88 FL (ref 81–99)
MCV RBC AUTO: 89 FL (ref 81–99)
MCV RBC AUTO: 90 FL (ref 81–99)
MCV RBC AUTO: 90 FL (ref 81–99)
MCV RBC AUTO: 91 FL (ref 81–99)
MCV RBC AUTO: 91 FL (ref 81–99)
MONOCYTES # BLD AUTO: 0.5 THOUSAND/ΜL (ref 0.17–1.22)
MONOCYTES # BLD AUTO: 0.7 THOUSAND/ΜL (ref 0.17–1.22)
MONOCYTES # BLD AUTO: 0.7 THOUSAND/ΜL (ref 0.17–1.22)
MONOCYTES # BLD AUTO: 0.8 THOUSAND/ΜL (ref 0.17–1.22)
MONOCYTES # BLD AUTO: 0.8 THOUSAND/ΜL (ref 0.17–1.22)
MONOCYTES # BLD AUTO: 0.9 THOUSAND/ΜL (ref 0.17–1.22)
MONOCYTES # BLD AUTO: 1.31 THOUSAND/UL (ref 0–1.22)
MONOCYTES # BLD AUTO: 1.4 THOUSAND/ΜL (ref 0.17–1.22)
MONOCYTES NFR BLD AUTO: 10 % (ref 2–12)
MONOCYTES NFR BLD AUTO: 11 % (ref 2–12)
MONOCYTES NFR BLD AUTO: 6 % (ref 4–12)
MONOCYTES NFR BLD AUTO: 7 % (ref 2–12)
MONOCYTES NFR BLD AUTO: 7 % (ref 2–12)
MONOCYTES NFR BLD AUTO: 8 % (ref 2–12)
NEUTROPHILS # BLD AUTO: 11.1 THOUSANDS/ΜL (ref 1.4–6.5)
NEUTROPHILS # BLD AUTO: 16.6 THOUSANDS/ΜL (ref 1.4–6.5)
NEUTROPHILS # BLD AUTO: 3.4 THOUSANDS/ΜL (ref 1.4–6.5)
NEUTROPHILS # BLD AUTO: 4.3 THOUSANDS/ΜL (ref 1.4–6.5)
NEUTROPHILS # BLD AUTO: 4.8 THOUSANDS/ΜL (ref 1.4–6.5)
NEUTROPHILS # BLD AUTO: 6.2 THOUSANDS/ΜL (ref 1.4–6.5)
NEUTROPHILS # BLD AUTO: 7.1 THOUSANDS/ΜL (ref 1.4–6.5)
NEUTS BAND NFR BLD MANUAL: 8 % (ref 0–8)
NEUTS SEG # BLD: 17.08 THOUSAND/UL (ref 1.81–6.82)
NEUTS SEG NFR BLD AUTO: 49 % (ref 42–75)
NEUTS SEG NFR BLD AUTO: 54 % (ref 42–75)
NEUTS SEG NFR BLD AUTO: 61 % (ref 42–75)
NEUTS SEG NFR BLD AUTO: 70 % (ref 42–75)
NEUTS SEG NFR BLD AUTO: 75 % (ref 42–75)
NEUTS SEG NFR BLD AUTO: 76 % (ref 42–75)
NEUTS SEG NFR BLD AUTO: 84 % (ref 42–75)
NEUTS SEG NFR BLD AUTO: 88 % (ref 42–75)
NITRITE UR QL STRIP: NEGATIVE
NON-SQ EPI CELLS URNS QL MICRO: ABNORMAL /HPF
NRBC BLD AUTO-RTO: 0 /100 WBCS
OVALOCYTES BLD QL SMEAR: PRESENT
P AXIS: 105 DEGREES
P AXIS: 11 DEGREES
P AXIS: 34 DEGREES
PH UR STRIP.AUTO: 6 [PH] (ref 5–8)
PH UR STRIP.AUTO: 7.5 [PH]
PH UR STRIP.AUTO: 8.5 [PH]
PHOSPHATE SERPL-MCNC: 3.3 MG/DL (ref 3–5.5)
PHOSPHATE SERPL-MCNC: 3.5 MG/DL (ref 3–5.5)
PLATELET # BLD AUTO: 140 THOUSANDS/UL (ref 149–390)
PLATELET # BLD AUTO: 142 THOUSANDS/UL (ref 149–390)
PLATELET # BLD AUTO: 168 THOUSANDS/UL (ref 149–390)
PLATELET # BLD AUTO: 173 THOUSANDS/UL (ref 149–390)
PLATELET # BLD AUTO: 178 THOUSANDS/UL (ref 149–390)
PLATELET # BLD AUTO: 213 THOUSANDS/UL (ref 149–390)
PLATELET # BLD AUTO: 234 THOUSANDS/UL (ref 149–390)
PLATELET # BLD AUTO: 240 THOUSANDS/UL (ref 149–390)
PLATELET # BLD AUTO: 241 THOUSANDS/UL (ref 149–390)
PLATELET # BLD AUTO: 287 THOUSANDS/UL (ref 149–390)
PLATELET # BLD AUTO: 302 THOUSANDS/UL (ref 149–390)
PLATELET BLD QL SMEAR: ABNORMAL
PMV BLD AUTO: 7.7 FL (ref 8.6–11.7)
PMV BLD AUTO: 7.8 FL (ref 8.6–11.7)
PMV BLD AUTO: 8 FL (ref 8.6–11.7)
PMV BLD AUTO: 8.1 FL (ref 8.6–11.7)
PMV BLD AUTO: 8.3 FL (ref 8.6–11.7)
PMV BLD AUTO: 8.4 FL (ref 8.6–11.7)
PMV BLD AUTO: 8.8 FL (ref 8.6–11.7)
PMV BLD AUTO: 9.1 FL (ref 8.6–11.7)
PMV BLD AUTO: 9.6 FL (ref 8.6–11.7)
POTASSIUM SERPL-SCNC: 3.1 MMOL/L (ref 3.5–5.5)
POTASSIUM SERPL-SCNC: 3.2 MMOL/L (ref 3.5–5.5)
POTASSIUM SERPL-SCNC: 3.2 MMOL/L (ref 3.5–5.5)
POTASSIUM SERPL-SCNC: 3.3 MMOL/L (ref 3.5–5.5)
POTASSIUM SERPL-SCNC: 3.4 MMOL/L (ref 3.5–5.5)
POTASSIUM SERPL-SCNC: 3.4 MMOL/L (ref 3.5–5.5)
POTASSIUM SERPL-SCNC: 3.5 MMOL/L (ref 3.5–5.5)
POTASSIUM SERPL-SCNC: 3.5 MMOL/L (ref 3.5–5.5)
POTASSIUM SERPL-SCNC: 3.6 MMOL/L (ref 3.5–5.5)
POTASSIUM SERPL-SCNC: 3.7 MMOL/L (ref 3.5–5.5)
POTASSIUM SERPL-SCNC: 3.7 MMOL/L (ref 3.5–5.5)
POTASSIUM SERPL-SCNC: 3.9 MMOL/L (ref 3.5–5.5)
POTASSIUM SERPL-SCNC: 4 MMOL/L (ref 3.5–5.5)
POTASSIUM SERPL-SCNC: 4.3 MMOL/L (ref 3.5–5.5)
PR INTERVAL: 126 MS
PR INTERVAL: 138 MS
PR INTERVAL: 150 MS
PROCALCITONIN SERPL-MCNC: 0.18 NG/ML
PROCALCITONIN SERPL-MCNC: 4.02 NG/ML
PROCALCITONIN SERPL-MCNC: 4.45 NG/ML
PROCALCITONIN SERPL-MCNC: 4.49 NG/ML
PROT SERPL-MCNC: 5.6 G/DL (ref 6.4–8.9)
PROT SERPL-MCNC: 5.8 G/DL (ref 6.4–8.9)
PROT SERPL-MCNC: 5.8 G/DL (ref 6.4–8.9)
PROT SERPL-MCNC: 6.2 G/DL (ref 6.4–8.9)
PROT SERPL-MCNC: 6.3 G/DL (ref 6.4–8.9)
PROT SERPL-MCNC: 6.4 G/DL (ref 6.4–8.9)
PROT SERPL-MCNC: 6.6 G/DL (ref 6.4–8.9)
PROT SERPL-MCNC: 6.9 G/DL (ref 6.4–8.9)
PROT UR STRIP-MCNC: ABNORMAL MG/DL
PROTHROMBIN TIME: 12.1 SECONDS (ref 10.2–13)
QRS AXIS: -2 DEGREES
QRS AXIS: -7 DEGREES
QRS AXIS: 173 DEGREES
QRSD INTERVAL: 70 MS
QRSD INTERVAL: 72 MS
QRSD INTERVAL: 78 MS
QT INTERVAL: 310 MS
QT INTERVAL: 348 MS
QT INTERVAL: 378 MS
QTC INTERVAL: 428 MS
QTC INTERVAL: 454 MS
QTC INTERVAL: 466 MS
RBC # BLD AUTO: 2.45 MILLION/UL (ref 3.9–5.2)
RBC # BLD AUTO: 2.48 MILLION/UL (ref 3.9–5.2)
RBC # BLD AUTO: 2.75 MILLION/UL (ref 3.9–5.2)
RBC # BLD AUTO: 2.96 MILLION/UL (ref 3.9–5.2)
RBC # BLD AUTO: 3.09 MILLION/UL (ref 3.9–5.2)
RBC # BLD AUTO: 3.26 MILLION/UL (ref 3.9–5.2)
RBC # BLD AUTO: 3.29 MILLION/UL (ref 3.9–5.2)
RBC # BLD AUTO: 3.36 MILLION/UL (ref 3.9–5.2)
RBC # BLD AUTO: 3.57 MILLION/UL (ref 3.9–5.2)
RBC #/AREA URNS AUTO: ABNORMAL /HPF
RH BLD: POSITIVE
SODIUM SERPL-SCNC: 139 MMOL/L (ref 134–143)
SODIUM SERPL-SCNC: 140 MMOL/L (ref 134–143)
SODIUM SERPL-SCNC: 141 MMOL/L (ref 134–143)
SODIUM SERPL-SCNC: 141 MMOL/L (ref 134–143)
SODIUM SERPL-SCNC: 142 MMOL/L (ref 134–143)
SODIUM SERPL-SCNC: 142 MMOL/L (ref 134–143)
SODIUM SERPL-SCNC: 143 MMOL/L (ref 134–143)
SODIUM SERPL-SCNC: 143 MMOL/L (ref 134–143)
SODIUM SERPL-SCNC: 144 MMOL/L (ref 134–143)
SODIUM SERPL-SCNC: 145 MMOL/L (ref 134–143)
SODIUM SERPL-SCNC: 145 MMOL/L (ref 134–143)
SODIUM SERPL-SCNC: 147 MMOL/L (ref 134–143)
SP GR UR STRIP.AUTO: 1.01 (ref 1–1.03)
SP GR UR STRIP.AUTO: 1.01 (ref 1–1.03)
SP GR UR STRIP.AUTO: 1.02 (ref 1–1.03)
SPECIMEN EXPIRATION DATE: NORMAL
T WAVE AXIS: -35 DEGREES
T WAVE AXIS: 246 DEGREES
T WAVE AXIS: 36 DEGREES
TOTAL CELLS COUNTED SPEC: 100
TROPONIN I SERPL-MCNC: 0.14 NG/ML
TROPONIN I SERPL-MCNC: 0.22 NG/ML
TROPONIN I SERPL-MCNC: 0.25 NG/ML
UNIT DISPENSE STATUS: NORMAL
UNIT DISPENSE STATUS: NORMAL
UNIT PRODUCT CODE: NORMAL
UNIT PRODUCT CODE: NORMAL
UNIT RH: NORMAL
UNIT RH: NORMAL
UROBILINOGEN UR QL STRIP.AUTO: 0.2 E.U./DL
VENTRICULAR RATE: 108 BPM
VENTRICULAR RATE: 115 BPM
VENTRICULAR RATE: 87 BPM
VIT B12 SERPL-MCNC: 1571 PG/ML (ref 100–900)
WBC # BLD AUTO: 10.1 THOUSAND/UL (ref 4.8–10.8)
WBC # BLD AUTO: 10.2 THOUSAND/UL (ref 4.8–10.8)
WBC # BLD AUTO: 13.3 THOUSAND/UL (ref 4.8–10.8)
WBC # BLD AUTO: 19 THOUSAND/UL (ref 4.8–10.8)
WBC # BLD AUTO: 21.9 THOUSAND/UL (ref 4.8–10.8)
WBC # BLD AUTO: 6.3 THOUSAND/UL (ref 4.8–10.8)
WBC # BLD AUTO: 6.8 THOUSAND/UL (ref 4.8–10.8)
WBC # BLD AUTO: 7.9 THOUSAND/UL (ref 4.8–10.8)
WBC # BLD AUTO: 9.5 THOUSAND/UL (ref 4.8–10.8)
WBC #/AREA URNS AUTO: ABNORMAL /HPF

## 2019-01-01 PROCEDURE — 82948 REAGENT STRIP/BLOOD GLUCOSE: CPT

## 2019-01-01 PROCEDURE — 85025 COMPLETE CBC W/AUTO DIFF WBC: CPT | Performed by: INTERNAL MEDICINE

## 2019-01-01 PROCEDURE — 82947 ASSAY GLUCOSE BLOOD QUANT: CPT | Performed by: FAMILY MEDICINE

## 2019-01-01 PROCEDURE — 87077 CULTURE AEROBIC IDENTIFY: CPT | Performed by: EMERGENCY MEDICINE

## 2019-01-01 PROCEDURE — 94760 N-INVAS EAR/PLS OXIMETRY 1: CPT

## 2019-01-01 PROCEDURE — 83036 HEMOGLOBIN GLYCOSYLATED A1C: CPT | Performed by: FAMILY MEDICINE

## 2019-01-01 PROCEDURE — 82272 OCCULT BLD FECES 1-3 TESTS: CPT | Performed by: FAMILY MEDICINE

## 2019-01-01 PROCEDURE — 83540 ASSAY OF IRON: CPT | Performed by: INTERNAL MEDICINE

## 2019-01-01 PROCEDURE — 96361 HYDRATE IV INFUSION ADD-ON: CPT

## 2019-01-01 PROCEDURE — C9113 INJ PANTOPRAZOLE SODIUM, VIA: HCPCS | Performed by: FAMILY MEDICINE

## 2019-01-01 PROCEDURE — G8988 SELF CARE GOAL STATUS: HCPCS

## 2019-01-01 PROCEDURE — 80053 COMPREHEN METABOLIC PANEL: CPT | Performed by: FAMILY MEDICINE

## 2019-01-01 PROCEDURE — 84145 PROCALCITONIN (PCT): CPT | Performed by: FAMILY MEDICINE

## 2019-01-01 PROCEDURE — G8987 SELF CARE CURRENT STATUS: HCPCS

## 2019-01-01 PROCEDURE — 85027 COMPLETE CBC AUTOMATED: CPT | Performed by: FAMILY MEDICINE

## 2019-01-01 PROCEDURE — 87077 CULTURE AEROBIC IDENTIFY: CPT | Performed by: FAMILY MEDICINE

## 2019-01-01 PROCEDURE — 87186 SC STD MICRODIL/AGAR DIL: CPT | Performed by: FAMILY MEDICINE

## 2019-01-01 PROCEDURE — 80053 COMPREHEN METABOLIC PANEL: CPT | Performed by: EMERGENCY MEDICINE

## 2019-01-01 PROCEDURE — 83605 ASSAY OF LACTIC ACID: CPT | Performed by: FAMILY MEDICINE

## 2019-01-01 PROCEDURE — 96365 THER/PROPH/DIAG IV INF INIT: CPT

## 2019-01-01 PROCEDURE — 85018 HEMOGLOBIN: CPT | Performed by: FAMILY MEDICINE

## 2019-01-01 PROCEDURE — 97163 PT EVAL HIGH COMPLEX 45 MIN: CPT

## 2019-01-01 PROCEDURE — 70450 CT HEAD/BRAIN W/O DYE: CPT

## 2019-01-01 PROCEDURE — 85730 THROMBOPLASTIN TIME PARTIAL: CPT | Performed by: INTERNAL MEDICINE

## 2019-01-01 PROCEDURE — 99285 EMERGENCY DEPT VISIT HI MDM: CPT

## 2019-01-01 PROCEDURE — 85025 COMPLETE CBC W/AUTO DIFF WBC: CPT | Performed by: FAMILY MEDICINE

## 2019-01-01 PROCEDURE — 94664 DEMO&/EVAL PT USE INHALER: CPT

## 2019-01-01 PROCEDURE — 80048 BASIC METABOLIC PNL TOTAL CA: CPT | Performed by: PHYSICIAN ASSISTANT

## 2019-01-01 PROCEDURE — 93005 ELECTROCARDIOGRAM TRACING: CPT

## 2019-01-01 PROCEDURE — 82607 VITAMIN B-12: CPT | Performed by: INTERNAL MEDICINE

## 2019-01-01 PROCEDURE — 86850 RBC ANTIBODY SCREEN: CPT

## 2019-01-01 PROCEDURE — 86900 BLOOD TYPING SEROLOGIC ABO: CPT

## 2019-01-01 PROCEDURE — P9016 RBC LEUKOCYTES REDUCED: HCPCS

## 2019-01-01 PROCEDURE — 83605 ASSAY OF LACTIC ACID: CPT | Performed by: INTERNAL MEDICINE

## 2019-01-01 PROCEDURE — 84484 ASSAY OF TROPONIN QUANT: CPT | Performed by: EMERGENCY MEDICINE

## 2019-01-01 PROCEDURE — 87040 BLOOD CULTURE FOR BACTERIA: CPT | Performed by: INTERNAL MEDICINE

## 2019-01-01 PROCEDURE — 83880 ASSAY OF NATRIURETIC PEPTIDE: CPT | Performed by: PHYSICIAN ASSISTANT

## 2019-01-01 PROCEDURE — 87086 URINE CULTURE/COLONY COUNT: CPT | Performed by: FAMILY MEDICINE

## 2019-01-01 PROCEDURE — 82272 OCCULT BLD FECES 1-3 TESTS: CPT

## 2019-01-01 PROCEDURE — 86901 BLOOD TYPING SEROLOGIC RH(D): CPT

## 2019-01-01 PROCEDURE — 0HBRXZZ EXCISION OF TOE NAIL, EXTERNAL APPROACH: ICD-10-PCS | Performed by: PODIATRIST

## 2019-01-01 PROCEDURE — 99304 1ST NF CARE SF/LOW MDM 25: CPT | Performed by: NURSE PRACTITIONER

## 2019-01-01 PROCEDURE — 80048 BASIC METABOLIC PNL TOTAL CA: CPT | Performed by: INTERNAL MEDICINE

## 2019-01-01 PROCEDURE — 71046 X-RAY EXAM CHEST 2 VIEWS: CPT

## 2019-01-01 PROCEDURE — 87086 URINE CULTURE/COLONY COUNT: CPT | Performed by: EMERGENCY MEDICINE

## 2019-01-01 PROCEDURE — 94640 AIRWAY INHALATION TREATMENT: CPT

## 2019-01-01 PROCEDURE — G8979 MOBILITY GOAL STATUS: HCPCS

## 2019-01-01 PROCEDURE — 85014 HEMATOCRIT: CPT | Performed by: FAMILY MEDICINE

## 2019-01-01 PROCEDURE — 71045 X-RAY EXAM CHEST 1 VIEW: CPT

## 2019-01-01 PROCEDURE — 85049 AUTOMATED PLATELET COUNT: CPT | Performed by: PHYSICIAN ASSISTANT

## 2019-01-01 PROCEDURE — 96374 THER/PROPH/DIAG INJ IV PUSH: CPT

## 2019-01-01 PROCEDURE — 36415 COLL VENOUS BLD VENIPUNCTURE: CPT | Performed by: EMERGENCY MEDICINE

## 2019-01-01 PROCEDURE — 82746 ASSAY OF FOLIC ACID SERUM: CPT | Performed by: INTERNAL MEDICINE

## 2019-01-01 PROCEDURE — 84100 ASSAY OF PHOSPHORUS: CPT | Performed by: INTERNAL MEDICINE

## 2019-01-01 PROCEDURE — 83735 ASSAY OF MAGNESIUM: CPT | Performed by: INTERNAL MEDICINE

## 2019-01-01 PROCEDURE — 84145 PROCALCITONIN (PCT): CPT | Performed by: INTERNAL MEDICINE

## 2019-01-01 PROCEDURE — 93010 ELECTROCARDIOGRAM REPORT: CPT | Performed by: INTERNAL MEDICINE

## 2019-01-01 PROCEDURE — 80053 COMPREHEN METABOLIC PANEL: CPT | Performed by: PHYSICIAN ASSISTANT

## 2019-01-01 PROCEDURE — 85610 PROTHROMBIN TIME: CPT | Performed by: INTERNAL MEDICINE

## 2019-01-01 PROCEDURE — 97530 THERAPEUTIC ACTIVITIES: CPT

## 2019-01-01 PROCEDURE — 85007 BL SMEAR W/DIFF WBC COUNT: CPT | Performed by: PHYSICIAN ASSISTANT

## 2019-01-01 PROCEDURE — 97167 OT EVAL HIGH COMPLEX 60 MIN: CPT

## 2019-01-01 PROCEDURE — 36415 COLL VENOUS BLD VENIPUNCTURE: CPT | Performed by: INTERNAL MEDICINE

## 2019-01-01 PROCEDURE — 76770 US EXAM ABDO BACK WALL COMP: CPT

## 2019-01-01 PROCEDURE — 85049 AUTOMATED PLATELET COUNT: CPT | Performed by: FAMILY MEDICINE

## 2019-01-01 PROCEDURE — G8978 MOBILITY CURRENT STATUS: HCPCS

## 2019-01-01 PROCEDURE — 96375 TX/PRO/DX INJ NEW DRUG ADDON: CPT

## 2019-01-01 PROCEDURE — 83605 ASSAY OF LACTIC ACID: CPT | Performed by: EMERGENCY MEDICINE

## 2019-01-01 PROCEDURE — 84100 ASSAY OF PHOSPHORUS: CPT | Performed by: PHYSICIAN ASSISTANT

## 2019-01-01 PROCEDURE — 81001 URINALYSIS AUTO W/SCOPE: CPT | Performed by: FAMILY MEDICINE

## 2019-01-01 PROCEDURE — 80053 COMPREHEN METABOLIC PANEL: CPT | Performed by: INTERNAL MEDICINE

## 2019-01-01 PROCEDURE — 84484 ASSAY OF TROPONIN QUANT: CPT | Performed by: PHYSICIAN ASSISTANT

## 2019-01-01 PROCEDURE — 81001 URINALYSIS AUTO W/SCOPE: CPT | Performed by: EMERGENCY MEDICINE

## 2019-01-01 PROCEDURE — 85025 COMPLETE CBC W/AUTO DIFF WBC: CPT | Performed by: PHYSICIAN ASSISTANT

## 2019-01-01 PROCEDURE — 87493 C DIFF AMPLIFIED PROBE: CPT | Performed by: FAMILY MEDICINE

## 2019-01-01 PROCEDURE — 87493 C DIFF AMPLIFIED PROBE: CPT | Performed by: INTERNAL MEDICINE

## 2019-01-01 PROCEDURE — 86920 COMPATIBILITY TEST SPIN: CPT

## 2019-01-01 PROCEDURE — 87186 SC STD MICRODIL/AGAR DIL: CPT | Performed by: EMERGENCY MEDICINE

## 2019-01-01 PROCEDURE — NC001 PR NO CHARGE: Performed by: NURSE PRACTITIONER

## 2019-01-01 PROCEDURE — 85027 COMPLETE CBC AUTOMATED: CPT | Performed by: PHYSICIAN ASSISTANT

## 2019-01-01 PROCEDURE — G8989 SELF CARE D/C STATUS: HCPCS

## 2019-01-01 PROCEDURE — 85025 COMPLETE CBC W/AUTO DIFF WBC: CPT | Performed by: EMERGENCY MEDICINE

## 2019-01-01 PROCEDURE — 30233N1 TRANSFUSION OF NONAUTOLOGOUS RED BLOOD CELLS INTO PERIPHERAL VEIN, PERCUTANEOUS APPROACH: ICD-10-PCS | Performed by: FAMILY MEDICINE

## 2019-01-01 PROCEDURE — 87040 BLOOD CULTURE FOR BACTERIA: CPT | Performed by: EMERGENCY MEDICINE

## 2019-01-01 PROCEDURE — 83735 ASSAY OF MAGNESIUM: CPT | Performed by: PHYSICIAN ASSISTANT

## 2019-01-01 RX ORDER — CEFTRIAXONE 1 G/50ML
1000 INJECTION, SOLUTION INTRAVENOUS EVERY 24 HOURS
Status: DISCONTINUED | OUTPATIENT
Start: 2019-01-01 | End: 2019-01-01

## 2019-01-01 RX ORDER — POLYVINYL ALCOHOL 14 MG/ML
1 SOLUTION/ DROPS OPHTHALMIC
Status: DISCONTINUED | OUTPATIENT
Start: 2019-01-01 | End: 2019-01-01 | Stop reason: HOSPADM

## 2019-01-01 RX ORDER — ISOSORBIDE DINITRATE 10 MG/1
10 TABLET ORAL 3 TIMES DAILY
Status: DISCONTINUED | OUTPATIENT
Start: 2019-01-01 | End: 2019-01-01 | Stop reason: HOSPADM

## 2019-01-01 RX ORDER — METRONIDAZOLE 500 MG/1
500 TABLET ORAL EVERY 8 HOURS SCHEDULED
Status: DISCONTINUED | OUTPATIENT
Start: 2019-01-01 | End: 2019-01-01 | Stop reason: HOSPADM

## 2019-01-01 RX ORDER — LACTOBACILLUS ACIDOPHILUS / LACTOBACILLUS BULGARICUS 100 MILLION CFU STRENGTH
1 GRANULES ORAL
Status: DISCONTINUED | OUTPATIENT
Start: 2019-01-01 | End: 2019-01-01 | Stop reason: HOSPADM

## 2019-01-01 RX ORDER — ATORVASTATIN CALCIUM 10 MG/1
10 TABLET, FILM COATED ORAL
Status: DISCONTINUED | OUTPATIENT
Start: 2019-01-01 | End: 2019-01-01

## 2019-01-01 RX ORDER — ACETAMINOPHEN 325 MG/1
650 TABLET ORAL EVERY 6 HOURS PRN
Status: DISCONTINUED | OUTPATIENT
Start: 2019-01-01 | End: 2019-01-01

## 2019-01-01 RX ORDER — SODIUM CHLORIDE FOR INHALATION 0.9 %
3 VIAL, NEBULIZER (ML) INHALATION EVERY 6 HOURS PRN
Status: DISCONTINUED | OUTPATIENT
Start: 2019-01-01 | End: 2019-01-01 | Stop reason: HOSPADM

## 2019-01-01 RX ORDER — LEVALBUTEROL 1.25 MG/.5ML
1.25 SOLUTION, CONCENTRATE RESPIRATORY (INHALATION)
Status: DISCONTINUED | OUTPATIENT
Start: 2019-01-01 | End: 2019-01-01

## 2019-01-01 RX ORDER — FUROSEMIDE 10 MG/ML
80 INJECTION INTRAMUSCULAR; INTRAVENOUS EVERY 8 HOURS
Status: COMPLETED | OUTPATIENT
Start: 2019-01-01 | End: 2019-01-01

## 2019-01-01 RX ORDER — LACTOBACILLUS ACIDOPHILUS / LACTOBACILLUS BULGARICUS 100 MILLION CFU STRENGTH
1 GRANULES ORAL
Refills: 0
Start: 2019-01-01 | End: 2019-01-01

## 2019-01-01 RX ORDER — CALCIUM CARBONATE 500(1250)
1 TABLET ORAL
Status: DISCONTINUED | OUTPATIENT
Start: 2019-01-01 | End: 2019-01-01 | Stop reason: HOSPADM

## 2019-01-01 RX ORDER — HEPARIN SODIUM 5000 [USP'U]/ML
5000 INJECTION, SOLUTION INTRAVENOUS; SUBCUTANEOUS EVERY 8 HOURS SCHEDULED
Status: DISCONTINUED | OUTPATIENT
Start: 2019-01-01 | End: 2019-01-01 | Stop reason: HOSPADM

## 2019-01-01 RX ORDER — FERROUS SULFATE 325(65) MG
325 TABLET ORAL
Status: DISCONTINUED | OUTPATIENT
Start: 2019-01-01 | End: 2019-01-01 | Stop reason: HOSPADM

## 2019-01-01 RX ORDER — HEPARIN SODIUM 5000 [USP'U]/ML
5000 INJECTION, SOLUTION INTRAVENOUS; SUBCUTANEOUS EVERY 8 HOURS SCHEDULED
Status: DISCONTINUED | OUTPATIENT
Start: 2019-01-01 | End: 2019-01-01 | Stop reason: SDUPTHER

## 2019-01-01 RX ORDER — SODIUM CHLORIDE FOR INHALATION 0.9 %
3 VIAL, NEBULIZER (ML) INHALATION
Status: DISCONTINUED | OUTPATIENT
Start: 2019-01-01 | End: 2019-01-01

## 2019-01-01 RX ORDER — FERROUS SULFATE 325(65) MG
325 TABLET ORAL
Status: DISCONTINUED | OUTPATIENT
Start: 2019-01-01 | End: 2019-01-01

## 2019-01-01 RX ORDER — CHOLECALCIFEROL (VITAMIN D3) 125 MCG
1000 CAPSULE ORAL DAILY
Status: DISCONTINUED | OUTPATIENT
Start: 2019-01-01 | End: 2019-01-01 | Stop reason: HOSPADM

## 2019-01-01 RX ORDER — CEFTRIAXONE 1 G/50ML
1000 INJECTION, SOLUTION INTRAVENOUS ONCE
Status: COMPLETED | OUTPATIENT
Start: 2019-01-01 | End: 2019-01-01

## 2019-01-01 RX ORDER — POTASSIUM CHLORIDE 20MEQ/15ML
40 LIQUID (ML) ORAL 2 TIMES DAILY
Status: DISCONTINUED | OUTPATIENT
Start: 2019-01-01 | End: 2019-01-01

## 2019-01-01 RX ORDER — LORAZEPAM 0.5 MG/1
0.25 TABLET ORAL EVERY 8 HOURS PRN
COMMUNITY

## 2019-01-01 RX ORDER — METRONIDAZOLE 500 MG/1
500 TABLET ORAL EVERY 8 HOURS SCHEDULED
Qty: 11 TABLET | Refills: 0
Start: 2019-01-01 | End: 2019-01-01

## 2019-01-01 RX ORDER — PANTOPRAZOLE SODIUM 40 MG/1
40 TABLET, DELAYED RELEASE ORAL
Status: DISCONTINUED | OUTPATIENT
Start: 2019-01-01 | End: 2019-01-01

## 2019-01-01 RX ORDER — DEXTROSE MONOHYDRATE 25 G/50ML
25 INJECTION, SOLUTION INTRAVENOUS ONCE
Status: COMPLETED | OUTPATIENT
Start: 2019-01-01 | End: 2019-01-01

## 2019-01-01 RX ORDER — IRON POLYSACCHARIDE COMPLEX 150 MG
150 CAPSULE ORAL 2 TIMES DAILY
Status: DISCONTINUED | OUTPATIENT
Start: 2019-01-01 | End: 2019-01-01 | Stop reason: HOSPADM

## 2019-01-01 RX ORDER — ATORVASTATIN CALCIUM 20 MG/1
10 TABLET, FILM COATED ORAL DAILY
Status: DISCONTINUED | OUTPATIENT
Start: 2019-01-01 | End: 2019-01-01 | Stop reason: HOSPADM

## 2019-01-01 RX ORDER — ACETAMINOPHEN 325 MG/1
650 TABLET ORAL EVERY 6 HOURS PRN
Status: DISCONTINUED | OUTPATIENT
Start: 2019-01-01 | End: 2019-01-01 | Stop reason: HOSPADM

## 2019-01-01 RX ORDER — HYDRALAZINE HYDROCHLORIDE 25 MG/1
25 TABLET, FILM COATED ORAL 2 TIMES DAILY
Status: DISCONTINUED | OUTPATIENT
Start: 2019-01-01 | End: 2019-01-01 | Stop reason: HOSPADM

## 2019-01-01 RX ORDER — VANCOMYCIN HYDROCHLORIDE 1 G/200ML
1000 INJECTION, SOLUTION INTRAVENOUS ONCE
Status: COMPLETED | OUTPATIENT
Start: 2019-01-01 | End: 2019-01-01

## 2019-01-01 RX ORDER — DOCUSATE SODIUM 100 MG/1
100 CAPSULE, LIQUID FILLED ORAL 2 TIMES DAILY PRN
Status: DISCONTINUED | OUTPATIENT
Start: 2019-01-01 | End: 2019-01-01 | Stop reason: HOSPADM

## 2019-01-01 RX ORDER — CEFEPIME HYDROCHLORIDE 2 G/50ML
2000 INJECTION, SOLUTION INTRAVENOUS ONCE
Status: COMPLETED | OUTPATIENT
Start: 2019-01-01 | End: 2019-01-01

## 2019-01-01 RX ORDER — PANTOPRAZOLE SODIUM 40 MG/1
40 TABLET, DELAYED RELEASE ORAL
Status: DISCONTINUED | OUTPATIENT
Start: 2019-01-01 | End: 2019-01-01 | Stop reason: HOSPADM

## 2019-01-01 RX ORDER — PANTOPRAZOLE SODIUM 40 MG/1
40 TABLET, DELAYED RELEASE ORAL DAILY
Status: DISCONTINUED | OUTPATIENT
Start: 2019-01-01 | End: 2019-01-01 | Stop reason: HOSPADM

## 2019-01-01 RX ORDER — UREA 10 %
250 LOTION (ML) TOPICAL DAILY
Status: DISCONTINUED | OUTPATIENT
Start: 2019-01-01 | End: 2019-01-01 | Stop reason: HOSPADM

## 2019-01-01 RX ORDER — ONDANSETRON 4 MG/1
4 TABLET, ORALLY DISINTEGRATING ORAL EVERY 8 HOURS PRN
Status: DISCONTINUED | OUTPATIENT
Start: 2019-01-01 | End: 2019-01-01 | Stop reason: HOSPADM

## 2019-01-01 RX ORDER — ONDANSETRON 2 MG/ML
4 INJECTION INTRAMUSCULAR; INTRAVENOUS EVERY 8 HOURS PRN
Status: DISCONTINUED | OUTPATIENT
Start: 2019-01-01 | End: 2019-01-01 | Stop reason: HOSPADM

## 2019-01-01 RX ORDER — POTASSIUM CHLORIDE 20 MEQ/1
20 TABLET, EXTENDED RELEASE ORAL ONCE
Status: COMPLETED | OUTPATIENT
Start: 2019-01-01 | End: 2019-01-01

## 2019-01-01 RX ORDER — LEVALBUTEROL 1.25 MG/.5ML
1.25 SOLUTION, CONCENTRATE RESPIRATORY (INHALATION) EVERY 6 HOURS PRN
Status: DISCONTINUED | OUTPATIENT
Start: 2019-01-01 | End: 2019-01-01 | Stop reason: HOSPADM

## 2019-01-01 RX ORDER — POLYETHYLENE GLYCOL 3350 17 G/17G
17 POWDER, FOR SOLUTION ORAL DAILY
Status: DISCONTINUED | OUTPATIENT
Start: 2019-01-01 | End: 2019-01-01 | Stop reason: HOSPADM

## 2019-01-01 RX ORDER — CEFEPIME HYDROCHLORIDE 2 G/50ML
2000 INJECTION, SOLUTION INTRAVENOUS EVERY 8 HOURS
Status: DISCONTINUED | OUTPATIENT
Start: 2019-01-01 | End: 2019-01-01

## 2019-01-01 RX ORDER — PANTOPRAZOLE SODIUM 40 MG/1
40 INJECTION, POWDER, FOR SOLUTION INTRAVENOUS EVERY 12 HOURS SCHEDULED
Status: DISCONTINUED | OUTPATIENT
Start: 2019-01-01 | End: 2019-01-01

## 2019-01-01 RX ORDER — POTASSIUM CHLORIDE 20MEQ/15ML
20 LIQUID (ML) ORAL ONCE
Status: DISCONTINUED | OUTPATIENT
Start: 2019-01-01 | End: 2019-01-01 | Stop reason: HOSPADM

## 2019-01-01 RX ORDER — B-COMPLEX WITH VITAMIN C
1 TABLET ORAL 2 TIMES DAILY WITH MEALS
Status: DISCONTINUED | OUTPATIENT
Start: 2019-01-01 | End: 2019-01-01 | Stop reason: HOSPADM

## 2019-01-01 RX ORDER — IRON POLYSACCHARIDE COMPLEX 150 MG
150 CAPSULE ORAL 2 TIMES DAILY
Refills: 0
Start: 2019-01-01

## 2019-01-01 RX ORDER — MINERAL OIL AND PETROLATUM 150; 830 MG/G; MG/G
OINTMENT OPHTHALMIC
Status: DISCONTINUED | OUTPATIENT
Start: 2019-01-01 | End: 2019-01-01 | Stop reason: HOSPADM

## 2019-01-01 RX ADMIN — SITAGLIPTIN 50 MG: 50 TABLET, FILM COATED ORAL at 10:15

## 2019-01-01 RX ADMIN — METOPROLOL TARTRATE 25 MG: 25 TABLET ORAL at 20:45

## 2019-01-01 RX ADMIN — LACTOBACILLUS ACIDOPHILUS / LACTOBACILLUS BULGARICUS 1 PACKET: 100 MILLION CFU STRENGTH GRANULES at 17:07

## 2019-01-01 RX ADMIN — LACTOBACILLUS ACIDOPHILUS / LACTOBACILLUS BULGARICUS 1 PACKET: 100 MILLION CFU STRENGTH GRANULES at 11:17

## 2019-01-01 RX ADMIN — LACTOBACILLUS ACIDOPHILUS / LACTOBACILLUS BULGARICUS 1 PACKET: 100 MILLION CFU STRENGTH GRANULES at 16:11

## 2019-01-01 RX ADMIN — PIPERACILLIN SODIUM AND TAZOBACTAM SODIUM 3.38 G: 3; .375 INJECTION, POWDER, LYOPHILIZED, FOR SOLUTION INTRAVENOUS at 06:00

## 2019-01-01 RX ADMIN — METOPROLOL TARTRATE 25 MG: 25 TABLET ORAL at 10:08

## 2019-01-01 RX ADMIN — PIPERACILLIN SODIUM AND TAZOBACTAM SODIUM 2.25 G: 2; .25 INJECTION, POWDER, LYOPHILIZED, FOR SOLUTION INTRAVENOUS at 17:21

## 2019-01-01 RX ADMIN — CEFEPIME HYDROCHLORIDE 2000 MG: 2 INJECTION, SOLUTION INTRAVENOUS at 07:22

## 2019-01-01 RX ADMIN — Medication 1 TABLET: at 09:22

## 2019-01-01 RX ADMIN — ENOXAPARIN SODIUM 30 MG: 30 INJECTION SUBCUTANEOUS at 09:33

## 2019-01-01 RX ADMIN — ISOSORBIDE DINITRATE 10 MG: 10 TABLET ORAL at 15:33

## 2019-01-01 RX ADMIN — LEVALBUTEROL 1.25 MG: 1.25 SOLUTION, CONCENTRATE RESPIRATORY (INHALATION) at 19:42

## 2019-01-01 RX ADMIN — METOPROLOL TARTRATE 25 MG: 25 TABLET ORAL at 10:17

## 2019-01-01 RX ADMIN — PIPERACILLIN SODIUM AND TAZOBACTAM SODIUM 3.38 G: 3; .375 INJECTION, POWDER, LYOPHILIZED, FOR SOLUTION INTRAVENOUS at 18:14

## 2019-01-01 RX ADMIN — INSULIN DETEMIR 4 UNITS: 100 INJECTION, SOLUTION SUBCUTANEOUS at 12:09

## 2019-01-01 RX ADMIN — ISOSORBIDE DINITRATE 10 MG: 10 TABLET ORAL at 21:21

## 2019-01-01 RX ADMIN — HYDRALAZINE HYDROCHLORIDE 25 MG: 25 TABLET, FILM COATED ORAL at 18:13

## 2019-01-01 RX ADMIN — SITAGLIPTIN 25 MG: 50 TABLET, FILM COATED ORAL at 10:08

## 2019-01-01 RX ADMIN — ISODIUM CHLORIDE 3 ML: 0.03 SOLUTION RESPIRATORY (INHALATION) at 07:10

## 2019-01-01 RX ADMIN — INSULIN DETEMIR 4 UNITS: 100 INJECTION, SOLUTION SUBCUTANEOUS at 17:09

## 2019-01-01 RX ADMIN — PANTOPRAZOLE SODIUM 40 MG: 40 INJECTION, POWDER, FOR SOLUTION INTRAVENOUS at 20:45

## 2019-01-01 RX ADMIN — SITAGLIPTIN 50 MG: 50 TABLET, FILM COATED ORAL at 08:43

## 2019-01-01 RX ADMIN — CALCIUM CARBONATE-VITAMIN D TAB 500 MG-200 UNIT 1 TABLET: 500-200 TAB at 09:44

## 2019-01-01 RX ADMIN — HEPARIN SODIUM 5000 UNITS: 5000 INJECTION INTRAVENOUS; SUBCUTANEOUS at 21:30

## 2019-01-01 RX ADMIN — LEVALBUTEROL 1.25 MG: 1.25 SOLUTION, CONCENTRATE RESPIRATORY (INHALATION) at 07:10

## 2019-01-01 RX ADMIN — INSULIN DETEMIR 4 UNITS: 100 INJECTION, SOLUTION SUBCUTANEOUS at 18:13

## 2019-01-01 RX ADMIN — VANCOMYCIN HYDROCHLORIDE 125 MG: 500 INJECTION, POWDER, LYOPHILIZED, FOR SOLUTION INTRAVENOUS at 23:22

## 2019-01-01 RX ADMIN — POLYETHYLENE GLYCOL 3350 17 G: 17 POWDER, FOR SOLUTION ORAL at 09:45

## 2019-01-01 RX ADMIN — VANCOMYCIN HYDROCHLORIDE 125 MG: 500 INJECTION, POWDER, LYOPHILIZED, FOR SOLUTION INTRAVENOUS at 10:13

## 2019-01-01 RX ADMIN — HYDRALAZINE HYDROCHLORIDE 25 MG: 25 TABLET, FILM COATED ORAL at 17:07

## 2019-01-01 RX ADMIN — ATORVASTATIN CALCIUM 10 MG: 20 TABLET, FILM COATED ORAL at 12:10

## 2019-01-01 RX ADMIN — HYDRALAZINE HYDROCHLORIDE 25 MG: 25 TABLET ORAL at 08:03

## 2019-01-01 RX ADMIN — LACTOBACILLUS ACIDOPHILUS / LACTOBACILLUS BULGARICUS 1 PACKET: 100 MILLION CFU STRENGTH GRANULES at 12:29

## 2019-01-01 RX ADMIN — PIPERACILLIN SODIUM AND TAZOBACTAM SODIUM 2.25 G: 2; .25 INJECTION, POWDER, LYOPHILIZED, FOR SOLUTION INTRAVENOUS at 12:11

## 2019-01-01 RX ADMIN — ATORVASTATIN CALCIUM 10 MG: 20 TABLET, FILM COATED ORAL at 10:14

## 2019-01-01 RX ADMIN — ISOSORBIDE DINITRATE 10 MG: 10 TABLET ORAL at 17:07

## 2019-01-01 RX ADMIN — METRONIDAZOLE 500 MG: 500 TABLET, FILM COATED ORAL at 05:47

## 2019-01-01 RX ADMIN — PANTOPRAZOLE SODIUM 40 MG: 40 INJECTION, POWDER, FOR SOLUTION INTRAVENOUS at 21:50

## 2019-01-01 RX ADMIN — LACTOBACILLUS ACIDOPHILUS / LACTOBACILLUS BULGARICUS 1 PACKET: 100 MILLION CFU STRENGTH GRANULES at 16:23

## 2019-01-01 RX ADMIN — ISOSORBIDE DINITRATE 10 MG: 10 TABLET ORAL at 16:52

## 2019-01-01 RX ADMIN — CEFTRIAXONE 1000 MG: 1 INJECTION, SOLUTION INTRAVENOUS at 10:34

## 2019-01-01 RX ADMIN — INSULIN DETEMIR 4 UNITS: 100 INJECTION, SOLUTION SUBCUTANEOUS at 09:42

## 2019-01-01 RX ADMIN — INSULIN LISPRO 1 UNITS: 100 INJECTION, SOLUTION INTRAVENOUS; SUBCUTANEOUS at 11:26

## 2019-01-01 RX ADMIN — PANTOPRAZOLE SODIUM 40 MG: 40 TABLET, DELAYED RELEASE ORAL at 10:13

## 2019-01-01 RX ADMIN — INSULIN LISPRO 1 UNITS: 100 INJECTION, SOLUTION INTRAVENOUS; SUBCUTANEOUS at 16:15

## 2019-01-01 RX ADMIN — SITAGLIPTIN 50 MG: 50 TABLET, FILM COATED ORAL at 09:44

## 2019-01-01 RX ADMIN — Medication 1 TABLET: at 09:32

## 2019-01-01 RX ADMIN — ATORVASTATIN CALCIUM 10 MG: 10 TABLET, FILM COATED ORAL at 16:12

## 2019-01-01 RX ADMIN — VANCOMYCIN HYDROCHLORIDE 125 MG: 500 INJECTION, POWDER, LYOPHILIZED, FOR SOLUTION INTRAVENOUS at 12:06

## 2019-01-01 RX ADMIN — LACTOBACILLUS ACIDOPHILUS / LACTOBACILLUS BULGARICUS 1 PACKET: 100 MILLION CFU STRENGTH GRANULES at 15:30

## 2019-01-01 RX ADMIN — ENOXAPARIN SODIUM 30 MG: 30 INJECTION SUBCUTANEOUS at 08:09

## 2019-01-01 RX ADMIN — METRONIDAZOLE 500 MG: 500 TABLET, FILM COATED ORAL at 21:30

## 2019-01-01 RX ADMIN — INSULIN DETEMIR 4 UNITS: 100 INJECTION, SOLUTION SUBCUTANEOUS at 17:02

## 2019-01-01 RX ADMIN — Medication 250 MG: at 08:42

## 2019-01-01 RX ADMIN — INSULIN DETEMIR 4 UNITS: 100 INJECTION, SOLUTION SUBCUTANEOUS at 08:09

## 2019-01-01 RX ADMIN — METOPROLOL TARTRATE 25 MG: 25 TABLET ORAL at 12:11

## 2019-01-01 RX ADMIN — SITAGLIPTIN 25 MG: 50 TABLET, FILM COATED ORAL at 09:32

## 2019-01-01 RX ADMIN — HEPARIN SODIUM 5000 UNITS: 5000 INJECTION INTRAVENOUS; SUBCUTANEOUS at 22:05

## 2019-01-01 RX ADMIN — CYANOCOBALAMIN TAB 500 MCG 1000 MCG: 500 TAB at 10:08

## 2019-01-01 RX ADMIN — HYDRALAZINE HYDROCHLORIDE 25 MG: 25 TABLET ORAL at 08:43

## 2019-01-01 RX ADMIN — LACTOBACILLUS ACIDOPHILUS / LACTOBACILLUS BULGARICUS 1 PACKET: 100 MILLION CFU STRENGTH GRANULES at 15:33

## 2019-01-01 RX ADMIN — INSULIN DETEMIR 4 UNITS: 100 INJECTION, SOLUTION SUBCUTANEOUS at 18:12

## 2019-01-01 RX ADMIN — LACTOBACILLUS ACIDOPHILUS / LACTOBACILLUS BULGARICUS 1 PACKET: 100 MILLION CFU STRENGTH GRANULES at 11:25

## 2019-01-01 RX ADMIN — LACTOBACILLUS ACIDOPHILUS / LACTOBACILLUS BULGARICUS 1 PACKET: 100 MILLION CFU STRENGTH GRANULES at 18:13

## 2019-01-01 RX ADMIN — METOPROLOL TARTRATE 25 MG: 25 TABLET ORAL at 08:03

## 2019-01-01 RX ADMIN — CALCIUM CARBONATE-VITAMIN D TAB 500 MG-200 UNIT 1 TABLET: 500-200 TAB at 16:11

## 2019-01-01 RX ADMIN — CALCIUM CARBONATE-VITAMIN D TAB 500 MG-200 UNIT 1 TABLET: 500-200 TAB at 10:15

## 2019-01-01 RX ADMIN — ISOSORBIDE DINITRATE 10 MG: 10 TABLET ORAL at 09:22

## 2019-01-01 RX ADMIN — METOPROLOL TARTRATE 25 MG: 25 TABLET ORAL at 20:37

## 2019-01-01 RX ADMIN — CYANOCOBALAMIN TAB 500 MCG 1000 MCG: 500 TAB at 08:48

## 2019-01-01 RX ADMIN — INSULIN LISPRO 6 UNITS: 100 INJECTION, SOLUTION INTRAVENOUS; SUBCUTANEOUS at 12:28

## 2019-01-01 RX ADMIN — VANCOMYCIN HYDROCHLORIDE 125 MG: 500 INJECTION, POWDER, LYOPHILIZED, FOR SOLUTION INTRAVENOUS at 10:19

## 2019-01-01 RX ADMIN — HYDRALAZINE HYDROCHLORIDE 25 MG: 25 TABLET, FILM COATED ORAL at 17:50

## 2019-01-01 RX ADMIN — Medication 1 TABLET: at 07:32

## 2019-01-01 RX ADMIN — CALCIUM CARBONATE-VITAMIN D TAB 500 MG-200 UNIT 1 TABLET: 500-200 TAB at 08:43

## 2019-01-01 RX ADMIN — POTASSIUM CHLORIDE 40 MEQ: 20 SOLUTION ORAL at 10:17

## 2019-01-01 RX ADMIN — VANCOMYCIN HYDROCHLORIDE 125 MG: 500 INJECTION, POWDER, LYOPHILIZED, FOR SOLUTION INTRAVENOUS at 21:23

## 2019-01-01 RX ADMIN — LACTOBACILLUS ACIDOPHILUS / LACTOBACILLUS BULGARICUS 1 PACKET: 100 MILLION CFU STRENGTH GRANULES at 12:12

## 2019-01-01 RX ADMIN — FERROUS SULFATE TAB 325 MG (65 MG ELEMENTAL FE) 325 MG: 325 (65 FE) TAB at 09:32

## 2019-01-01 RX ADMIN — ENOXAPARIN SODIUM 30 MG: 30 INJECTION SUBCUTANEOUS at 10:07

## 2019-01-01 RX ADMIN — ENOXAPARIN SODIUM 30 MG: 30 INJECTION SUBCUTANEOUS at 16:11

## 2019-01-01 RX ADMIN — PIPERACILLIN SODIUM AND TAZOBACTAM SODIUM 2.25 G: 2; .25 INJECTION, POWDER, LYOPHILIZED, FOR SOLUTION INTRAVENOUS at 00:38

## 2019-01-01 RX ADMIN — VANCOMYCIN HYDROCHLORIDE 125 MG: 500 INJECTION, POWDER, LYOPHILIZED, FOR SOLUTION INTRAVENOUS at 21:04

## 2019-01-01 RX ADMIN — CALCIUM CARBONATE-VITAMIN D TAB 500 MG-200 UNIT 1 TABLET: 500-200 TAB at 17:17

## 2019-01-01 RX ADMIN — VANCOMYCIN HYDROCHLORIDE 125 MG: 500 INJECTION, POWDER, LYOPHILIZED, FOR SOLUTION INTRAVENOUS at 21:20

## 2019-01-01 RX ADMIN — HYDRALAZINE HYDROCHLORIDE 25 MG: 25 TABLET ORAL at 12:10

## 2019-01-01 RX ADMIN — INSULIN LISPRO 2 UNITS: 100 INJECTION, SOLUTION INTRAVENOUS; SUBCUTANEOUS at 12:16

## 2019-01-01 RX ADMIN — HEPARIN SODIUM 5000 UNITS: 5000 INJECTION INTRAVENOUS; SUBCUTANEOUS at 05:46

## 2019-01-01 RX ADMIN — METOPROLOL TARTRATE 25 MG: 25 TABLET ORAL at 23:28

## 2019-01-01 RX ADMIN — HYDRALAZINE HYDROCHLORIDE 25 MG: 25 TABLET, FILM COATED ORAL at 08:53

## 2019-01-01 RX ADMIN — ATORVASTATIN CALCIUM 10 MG: 20 TABLET, FILM COATED ORAL at 08:04

## 2019-01-01 RX ADMIN — Medication 150 MG: at 17:41

## 2019-01-01 RX ADMIN — SITAGLIPTIN 25 MG: 50 TABLET, FILM COATED ORAL at 09:16

## 2019-01-01 RX ADMIN — ENOXAPARIN SODIUM 30 MG: 30 INJECTION SUBCUTANEOUS at 08:42

## 2019-01-01 RX ADMIN — CYANOCOBALAMIN TAB 500 MCG 1000 MCG: 500 TAB at 09:22

## 2019-01-01 RX ADMIN — METRONIDAZOLE 500 MG: 500 TABLET, FILM COATED ORAL at 13:22

## 2019-01-01 RX ADMIN — VANCOMYCIN HYDROCHLORIDE 125 MG: 500 INJECTION, POWDER, LYOPHILIZED, FOR SOLUTION INTRAVENOUS at 22:05

## 2019-01-01 RX ADMIN — LACTOBACILLUS ACIDOPHILUS / LACTOBACILLUS BULGARICUS 1 PACKET: 100 MILLION CFU STRENGTH GRANULES at 07:32

## 2019-01-01 RX ADMIN — SITAGLIPTIN 25 MG: 50 TABLET, FILM COATED ORAL at 09:22

## 2019-01-01 RX ADMIN — PIPERACILLIN SODIUM AND TAZOBACTAM SODIUM 3.38 G: 3; .375 INJECTION, POWDER, LYOPHILIZED, FOR SOLUTION INTRAVENOUS at 18:08

## 2019-01-01 RX ADMIN — HYDRALAZINE HYDROCHLORIDE 25 MG: 25 TABLET ORAL at 10:15

## 2019-01-01 RX ADMIN — ISOSORBIDE DINITRATE 10 MG: 10 TABLET ORAL at 21:30

## 2019-01-01 RX ADMIN — ISOSORBIDE DINITRATE 10 MG: 10 TABLET ORAL at 15:30

## 2019-01-01 RX ADMIN — METOPROLOL TARTRATE 25 MG: 25 TABLET ORAL at 21:23

## 2019-01-01 RX ADMIN — INSULIN LISPRO 1 UNITS: 100 INJECTION, SOLUTION INTRAVENOUS; SUBCUTANEOUS at 12:29

## 2019-01-01 RX ADMIN — ISOSORBIDE DINITRATE 10 MG: 10 TABLET ORAL at 08:42

## 2019-01-01 RX ADMIN — VANCOMYCIN HYDROCHLORIDE 125 MG: 500 INJECTION, POWDER, LYOPHILIZED, FOR SOLUTION INTRAVENOUS at 20:41

## 2019-01-01 RX ADMIN — ISOSORBIDE DINITRATE 10 MG: 10 TABLET ORAL at 17:21

## 2019-01-01 RX ADMIN — METOPROLOL TARTRATE 25 MG: 25 TABLET ORAL at 08:43

## 2019-01-01 RX ADMIN — Medication 150 MG: at 09:44

## 2019-01-01 RX ADMIN — METOPROLOL TARTRATE 25 MG: 25 TABLET ORAL at 21:30

## 2019-01-01 RX ADMIN — ISOSORBIDE DINITRATE 10 MG: 10 TABLET ORAL at 21:20

## 2019-01-01 RX ADMIN — INSULIN LISPRO 2 UNITS: 100 INJECTION, SOLUTION INTRAVENOUS; SUBCUTANEOUS at 13:07

## 2019-01-01 RX ADMIN — FERROUS SULFATE TAB 325 MG (65 MG ELEMENTAL FE) 325 MG: 325 (65 FE) TAB at 09:22

## 2019-01-01 RX ADMIN — ISOSORBIDE DINITRATE 10 MG: 10 TABLET ORAL at 08:43

## 2019-01-01 RX ADMIN — PANTOPRAZOLE SODIUM 40 MG: 40 TABLET, DELAYED RELEASE ORAL at 10:08

## 2019-01-01 RX ADMIN — LACTOBACILLUS ACIDOPHILUS / LACTOBACILLUS BULGARICUS 1 PACKET: 100 MILLION CFU STRENGTH GRANULES at 12:16

## 2019-01-01 RX ADMIN — VANCOMYCIN HYDROCHLORIDE 125 MG: 500 INJECTION, POWDER, LYOPHILIZED, FOR SOLUTION INTRAVENOUS at 09:06

## 2019-01-01 RX ADMIN — LACTOBACILLUS ACIDOPHILUS / LACTOBACILLUS BULGARICUS 1 PACKET: 100 MILLION CFU STRENGTH GRANULES at 13:07

## 2019-01-01 RX ADMIN — METOPROLOL TARTRATE 25 MG: 25 TABLET ORAL at 21:21

## 2019-01-01 RX ADMIN — CYANOCOBALAMIN TAB 500 MCG 1000 MCG: 500 TAB at 10:15

## 2019-01-01 RX ADMIN — LACTOBACILLUS ACIDOPHILUS / LACTOBACILLUS BULGARICUS 1 PACKET: 100 MILLION CFU STRENGTH GRANULES at 17:20

## 2019-01-01 RX ADMIN — CYANOCOBALAMIN TAB 500 MCG 1000 MCG: 500 TAB at 08:03

## 2019-01-01 RX ADMIN — INSULIN DETEMIR 4 UNITS: 100 INJECTION, SOLUTION SUBCUTANEOUS at 08:58

## 2019-01-01 RX ADMIN — CEFTRIAXONE 1000 MG: 1 INJECTION, SOLUTION INTRAVENOUS at 10:44

## 2019-01-01 RX ADMIN — SITAGLIPTIN 50 MG: 50 TABLET, FILM COATED ORAL at 08:03

## 2019-01-01 RX ADMIN — PIPERACILLIN SODIUM AND TAZOBACTAM SODIUM 3.38 G: 3; .375 INJECTION, POWDER, LYOPHILIZED, FOR SOLUTION INTRAVENOUS at 19:26

## 2019-01-01 RX ADMIN — INSULIN DETEMIR 4 UNITS: 100 INJECTION, SOLUTION SUBCUTANEOUS at 10:17

## 2019-01-01 RX ADMIN — VANCOMYCIN HYDROCHLORIDE 125 MG: 500 INJECTION, POWDER, LYOPHILIZED, FOR SOLUTION INTRAVENOUS at 16:11

## 2019-01-01 RX ADMIN — METOPROLOL TARTRATE 25 MG: 25 TABLET ORAL at 21:20

## 2019-01-01 RX ADMIN — Medication 250 MG: at 12:06

## 2019-01-01 RX ADMIN — ISOSORBIDE DINITRATE 10 MG: 10 TABLET ORAL at 10:08

## 2019-01-01 RX ADMIN — ISOSORBIDE DINITRATE 10 MG: 10 TABLET ORAL at 09:43

## 2019-01-01 RX ADMIN — PANTOPRAZOLE SODIUM 40 MG: 40 TABLET, DELAYED RELEASE ORAL at 09:12

## 2019-01-01 RX ADMIN — METOPROLOL TARTRATE 25 MG: 25 TABLET ORAL at 21:04

## 2019-01-01 RX ADMIN — CALCIUM CARBONATE-VITAMIN D TAB 500 MG-200 UNIT 1 TABLET: 500-200 TAB at 18:12

## 2019-01-01 RX ADMIN — INSULIN DETEMIR 4 UNITS: 100 INJECTION, SOLUTION SUBCUTANEOUS at 18:07

## 2019-01-01 RX ADMIN — LACTOBACILLUS ACIDOPHILUS / LACTOBACILLUS BULGARICUS 1 PACKET: 100 MILLION CFU STRENGTH GRANULES at 08:41

## 2019-01-01 RX ADMIN — SITAGLIPTIN 25 MG: 50 TABLET, FILM COATED ORAL at 08:42

## 2019-01-01 RX ADMIN — HYDRALAZINE HYDROCHLORIDE 25 MG: 25 TABLET, FILM COATED ORAL at 18:07

## 2019-01-01 RX ADMIN — VANCOMYCIN HYDROCHLORIDE 125 MG: 500 INJECTION, POWDER, LYOPHILIZED, FOR SOLUTION INTRAVENOUS at 21:22

## 2019-01-01 RX ADMIN — CYANOCOBALAMIN TAB 500 MCG 1000 MCG: 500 TAB at 08:43

## 2019-01-01 RX ADMIN — PIPERACILLIN SODIUM AND TAZOBACTAM SODIUM 2.25 G: 2; .25 INJECTION, POWDER, LYOPHILIZED, FOR SOLUTION INTRAVENOUS at 00:56

## 2019-01-01 RX ADMIN — HYDRALAZINE HYDROCHLORIDE 25 MG: 25 TABLET, FILM COATED ORAL at 10:08

## 2019-01-01 RX ADMIN — METRONIDAZOLE 500 MG: 500 TABLET, FILM COATED ORAL at 16:11

## 2019-01-01 RX ADMIN — PIPERACILLIN SODIUM AND TAZOBACTAM SODIUM 3.38 G: 3; .375 INJECTION, POWDER, LYOPHILIZED, FOR SOLUTION INTRAVENOUS at 05:52

## 2019-01-01 RX ADMIN — INSULIN LISPRO 4 UNITS: 100 INJECTION, SOLUTION INTRAVENOUS; SUBCUTANEOUS at 12:06

## 2019-01-01 RX ADMIN — SODIUM CHLORIDE 1000 ML: 0.9 INJECTION, SOLUTION INTRAVENOUS at 07:22

## 2019-01-01 RX ADMIN — PIPERACILLIN SODIUM AND TAZOBACTAM SODIUM 2.25 G: 2; .25 INJECTION, POWDER, LYOPHILIZED, FOR SOLUTION INTRAVENOUS at 18:11

## 2019-01-01 RX ADMIN — INSULIN LISPRO 2 UNITS: 100 INJECTION, SOLUTION INTRAVENOUS; SUBCUTANEOUS at 12:22

## 2019-01-01 RX ADMIN — METOPROLOL TARTRATE 25 MG: 25 TABLET ORAL at 09:06

## 2019-01-01 RX ADMIN — VANCOMYCIN HYDROCHLORIDE 125 MG: 500 INJECTION, POWDER, LYOPHILIZED, FOR SOLUTION INTRAVENOUS at 20:45

## 2019-01-01 RX ADMIN — PANTOPRAZOLE SODIUM 40 MG: 40 TABLET, DELAYED RELEASE ORAL at 09:22

## 2019-01-01 RX ADMIN — INSULIN LISPRO 3 UNITS: 100 INJECTION, SOLUTION INTRAVENOUS; SUBCUTANEOUS at 07:29

## 2019-01-01 RX ADMIN — VANCOMYCIN HYDROCHLORIDE 125 MG: 500 INJECTION, POWDER, LYOPHILIZED, FOR SOLUTION INTRAVENOUS at 21:21

## 2019-01-01 RX ADMIN — FERROUS SULFATE TAB 325 MG (65 MG ELEMENTAL FE) 325 MG: 325 (65 FE) TAB at 07:32

## 2019-01-01 RX ADMIN — PANTOPRAZOLE SODIUM 40 MG: 40 INJECTION, POWDER, FOR SOLUTION INTRAVENOUS at 08:04

## 2019-01-01 RX ADMIN — VANCOMYCIN HYDROCHLORIDE 125 MG: 500 INJECTION, POWDER, LYOPHILIZED, FOR SOLUTION INTRAVENOUS at 21:30

## 2019-01-01 RX ADMIN — FERROUS SULFATE TAB 325 MG (65 MG ELEMENTAL FE) 325 MG: 325 (65 FE) TAB at 08:04

## 2019-01-01 RX ADMIN — PANTOPRAZOLE SODIUM 40 MG: 40 TABLET, DELAYED RELEASE ORAL at 05:47

## 2019-01-01 RX ADMIN — ISOSORBIDE DINITRATE 10 MG: 10 TABLET ORAL at 21:05

## 2019-01-01 RX ADMIN — HEPARIN SODIUM 5000 UNITS: 5000 INJECTION INTRAVENOUS; SUBCUTANEOUS at 15:33

## 2019-01-01 RX ADMIN — FERROUS SULFATE TAB 325 MG (65 MG ELEMENTAL FE) 325 MG: 325 (65 FE) TAB at 08:41

## 2019-01-01 RX ADMIN — Medication 1 TABLET: at 08:43

## 2019-01-01 RX ADMIN — VANCOMYCIN HYDROCHLORIDE 125 MG: 500 INJECTION, POWDER, LYOPHILIZED, FOR SOLUTION INTRAVENOUS at 09:35

## 2019-01-01 RX ADMIN — PANTOPRAZOLE SODIUM 40 MG: 40 INJECTION, POWDER, FOR SOLUTION INTRAVENOUS at 21:22

## 2019-01-01 RX ADMIN — Medication 1 TABLET: at 08:46

## 2019-01-01 RX ADMIN — SODIUM CHLORIDE 500 ML: 0.9 INJECTION, SOLUTION INTRAVENOUS at 10:40

## 2019-01-01 RX ADMIN — ISOSORBIDE DINITRATE 10 MG: 10 TABLET ORAL at 17:15

## 2019-01-01 RX ADMIN — HYDRALAZINE HYDROCHLORIDE 25 MG: 25 TABLET, FILM COATED ORAL at 09:32

## 2019-01-01 RX ADMIN — CYANOCOBALAMIN TAB 500 MCG 1000 MCG: 500 TAB at 08:41

## 2019-01-01 RX ADMIN — PANTOPRAZOLE SODIUM 40 MG: 40 TABLET, DELAYED RELEASE ORAL at 08:43

## 2019-01-01 RX ADMIN — ISOSORBIDE DINITRATE 10 MG: 10 TABLET ORAL at 21:50

## 2019-01-01 RX ADMIN — FUROSEMIDE 80 MG: 10 INJECTION, SOLUTION INTRAMUSCULAR; INTRAVENOUS at 18:34

## 2019-01-01 RX ADMIN — PANTOPRAZOLE SODIUM 40 MG: 40 TABLET, DELAYED RELEASE ORAL at 09:32

## 2019-01-01 RX ADMIN — VANCOMYCIN HYDROCHLORIDE 1000 MG: 1 INJECTION, SOLUTION INTRAVENOUS at 07:55

## 2019-01-01 RX ADMIN — HYDRALAZINE HYDROCHLORIDE 25 MG: 25 TABLET, FILM COATED ORAL at 09:22

## 2019-01-01 RX ADMIN — ENOXAPARIN SODIUM 30 MG: 30 INJECTION SUBCUTANEOUS at 09:23

## 2019-01-01 RX ADMIN — VANCOMYCIN HYDROCHLORIDE 125 MG: 500 INJECTION, POWDER, LYOPHILIZED, FOR SOLUTION INTRAVENOUS at 08:05

## 2019-01-01 RX ADMIN — CEFTRIAXONE 1000 MG: 1 INJECTION, SOLUTION INTRAVENOUS at 11:21

## 2019-01-01 RX ADMIN — METOPROLOL TARTRATE 25 MG: 25 TABLET ORAL at 09:44

## 2019-01-01 RX ADMIN — LACTOBACILLUS ACIDOPHILUS / LACTOBACILLUS BULGARICUS 1 PACKET: 100 MILLION CFU STRENGTH GRANULES at 09:44

## 2019-01-01 RX ADMIN — ISOSORBIDE DINITRATE 10 MG: 10 TABLET ORAL at 16:23

## 2019-01-01 RX ADMIN — HEPARIN SODIUM 5000 UNITS: 5000 INJECTION INTRAVENOUS; SUBCUTANEOUS at 05:45

## 2019-01-01 RX ADMIN — HYDRALAZINE HYDROCHLORIDE 25 MG: 25 TABLET ORAL at 09:44

## 2019-01-01 RX ADMIN — HYDRALAZINE HYDROCHLORIDE 25 MG: 25 TABLET, FILM COATED ORAL at 08:41

## 2019-01-01 RX ADMIN — Medication 250 MG: at 10:15

## 2019-01-01 RX ADMIN — INSULIN DETEMIR 4 UNITS: 100 INJECTION, SOLUTION SUBCUTANEOUS at 10:07

## 2019-01-01 RX ADMIN — FUROSEMIDE 80 MG: 10 INJECTION, SOLUTION INTRAMUSCULAR; INTRAVENOUS at 10:16

## 2019-01-01 RX ADMIN — FUROSEMIDE 80 MG: 10 INJECTION, SOLUTION INTRAMUSCULAR; INTRAVENOUS at 02:18

## 2019-01-01 RX ADMIN — CYANOCOBALAMIN TAB 500 MCG 1000 MCG: 500 TAB at 09:32

## 2019-01-01 RX ADMIN — PANTOPRAZOLE SODIUM 40 MG: 40 INJECTION, POWDER, FOR SOLUTION INTRAVENOUS at 12:11

## 2019-01-01 RX ADMIN — ISODIUM CHLORIDE 3 ML: 0.03 SOLUTION RESPIRATORY (INHALATION) at 19:42

## 2019-01-01 RX ADMIN — VANCOMYCIN HYDROCHLORIDE 125 MG: 500 INJECTION, POWDER, LYOPHILIZED, FOR SOLUTION INTRAVENOUS at 09:33

## 2019-01-01 RX ADMIN — METOPROLOL TARTRATE 25 MG: 25 TABLET ORAL at 09:22

## 2019-01-01 RX ADMIN — DEXTROSE MONOHYDRATE 25 ML: 25 INJECTION, SOLUTION INTRAVENOUS at 20:59

## 2019-01-01 RX ADMIN — HEPARIN SODIUM 5000 UNITS: 5000 INJECTION INTRAVENOUS; SUBCUTANEOUS at 21:44

## 2019-01-01 RX ADMIN — ISOSORBIDE DINITRATE 10 MG: 10 TABLET ORAL at 20:37

## 2019-01-01 RX ADMIN — ISOSORBIDE DINITRATE 10 MG: 10 TABLET ORAL at 09:03

## 2019-01-01 RX ADMIN — PIPERACILLIN SODIUM AND TAZOBACTAM SODIUM 2.25 G: 2; .25 INJECTION, POWDER, LYOPHILIZED, FOR SOLUTION INTRAVENOUS at 13:54

## 2019-01-01 RX ADMIN — LACTOBACILLUS ACIDOPHILUS / LACTOBACILLUS BULGARICUS 1 PACKET: 100 MILLION CFU STRENGTH GRANULES at 09:05

## 2019-01-01 RX ADMIN — CYANOCOBALAMIN TAB 500 MCG 1000 MCG: 500 TAB at 12:10

## 2019-01-01 RX ADMIN — PIPERACILLIN SODIUM AND TAZOBACTAM SODIUM 3.38 G: 3; .375 INJECTION, POWDER, LYOPHILIZED, FOR SOLUTION INTRAVENOUS at 06:07

## 2019-01-01 RX ADMIN — LACTOBACILLUS ACIDOPHILUS / LACTOBACILLUS BULGARICUS 1 PACKET: 100 MILLION CFU STRENGTH GRANULES at 09:32

## 2019-01-01 RX ADMIN — LACTOBACILLUS ACIDOPHILUS / LACTOBACILLUS BULGARICUS 1 PACKET: 100 MILLION CFU STRENGTH GRANULES at 10:16

## 2019-01-01 RX ADMIN — VANCOMYCIN HYDROCHLORIDE 125 MG: 500 INJECTION, POWDER, LYOPHILIZED, FOR SOLUTION INTRAVENOUS at 09:18

## 2019-01-01 RX ADMIN — ATORVASTATIN CALCIUM 10 MG: 20 TABLET, FILM COATED ORAL at 09:44

## 2019-01-01 RX ADMIN — INSULIN DETEMIR 4 UNITS: 100 INJECTION, SOLUTION SUBCUTANEOUS at 13:24

## 2019-01-01 RX ADMIN — PIPERACILLIN SODIUM AND TAZOBACTAM SODIUM 3.38 G: 3; .375 INJECTION, POWDER, LYOPHILIZED, FOR SOLUTION INTRAVENOUS at 05:46

## 2019-01-01 RX ADMIN — INSULIN LISPRO 1 UNITS: 100 INJECTION, SOLUTION INTRAVENOUS; SUBCUTANEOUS at 12:07

## 2019-01-01 RX ADMIN — METRONIDAZOLE 500 MG: 500 TABLET, FILM COATED ORAL at 13:08

## 2019-01-01 RX ADMIN — INSULIN LISPRO 1 UNITS: 100 INJECTION, SOLUTION INTRAVENOUS; SUBCUTANEOUS at 16:11

## 2019-01-01 RX ADMIN — ISOSORBIDE DINITRATE 10 MG: 10 TABLET ORAL at 16:11

## 2019-01-01 RX ADMIN — HEPARIN SODIUM 5000 UNITS: 5000 INJECTION INTRAVENOUS; SUBCUTANEOUS at 16:11

## 2019-01-01 RX ADMIN — LACTOBACILLUS ACIDOPHILUS / LACTOBACILLUS BULGARICUS 1 PACKET: 100 MILLION CFU STRENGTH GRANULES at 08:04

## 2019-01-01 RX ADMIN — CALCIUM CARBONATE-VITAMIN D TAB 500 MG-200 UNIT 1 TABLET: 500-200 TAB at 17:20

## 2019-01-01 RX ADMIN — INSULIN DETEMIR 4 UNITS: 100 INJECTION, SOLUTION SUBCUTANEOUS at 17:51

## 2019-01-01 RX ADMIN — VANCOMYCIN HYDROCHLORIDE 125 MG: 500 INJECTION, POWDER, LYOPHILIZED, FOR SOLUTION INTRAVENOUS at 10:05

## 2019-01-01 RX ADMIN — INSULIN LISPRO 1 UNITS: 100 INJECTION, SOLUTION INTRAVENOUS; SUBCUTANEOUS at 16:52

## 2019-01-01 RX ADMIN — PIPERACILLIN SODIUM AND TAZOBACTAM SODIUM 3.38 G: 3; .375 INJECTION, POWDER, LYOPHILIZED, FOR SOLUTION INTRAVENOUS at 18:22

## 2019-01-01 RX ADMIN — LACTOBACILLUS ACIDOPHILUS / LACTOBACILLUS BULGARICUS 1 PACKET: 100 MILLION CFU STRENGTH GRANULES at 17:18

## 2019-01-01 RX ADMIN — ISOSORBIDE DINITRATE 10 MG: 10 TABLET ORAL at 08:04

## 2019-01-01 RX ADMIN — METOPROLOL TARTRATE 25 MG: 25 TABLET ORAL at 09:32

## 2019-01-01 RX ADMIN — POLYETHYLENE GLYCOL 3350 17 G: 17 POWDER, FOR SOLUTION ORAL at 10:16

## 2019-01-01 RX ADMIN — ISOSORBIDE DINITRATE 10 MG: 10 TABLET ORAL at 09:32

## 2019-01-01 RX ADMIN — CYANOCOBALAMIN TAB 500 MCG 1000 MCG: 500 TAB at 09:44

## 2019-01-01 RX ADMIN — HEPARIN SODIUM 5000 UNITS: 5000 INJECTION INTRAVENOUS; SUBCUTANEOUS at 05:53

## 2019-01-01 RX ADMIN — LACTOBACILLUS ACIDOPHILUS / LACTOBACILLUS BULGARICUS 1 PACKET: 100 MILLION CFU STRENGTH GRANULES at 09:23

## 2019-01-01 RX ADMIN — HYDRALAZINE HYDROCHLORIDE 25 MG: 25 TABLET, FILM COATED ORAL at 17:02

## 2019-01-01 RX ADMIN — PANTOPRAZOLE SODIUM 40 MG: 40 TABLET, DELAYED RELEASE ORAL at 16:11

## 2019-01-01 RX ADMIN — INSULIN DETEMIR 4 UNITS: 100 INJECTION, SOLUTION SUBCUTANEOUS at 17:06

## 2019-01-01 RX ADMIN — ISOSORBIDE DINITRATE 10 MG: 10 TABLET ORAL at 12:12

## 2019-01-01 RX ADMIN — CALCIUM CARBONATE-VITAMIN D TAB 500 MG-200 UNIT 1 TABLET: 500-200 TAB at 08:04

## 2019-01-01 RX ADMIN — HEPARIN SODIUM 5000 UNITS: 5000 INJECTION INTRAVENOUS; SUBCUTANEOUS at 06:27

## 2019-01-01 RX ADMIN — ISOSORBIDE DINITRATE 10 MG: 10 TABLET ORAL at 21:23

## 2019-01-01 RX ADMIN — PIPERACILLIN SODIUM AND TAZOBACTAM SODIUM 2.25 G: 2; .25 INJECTION, POWDER, LYOPHILIZED, FOR SOLUTION INTRAVENOUS at 06:11

## 2019-01-01 RX ADMIN — PIPERACILLIN SODIUM AND TAZOBACTAM SODIUM 2.25 G: 2; .25 INJECTION, POWDER, LYOPHILIZED, FOR SOLUTION INTRAVENOUS at 06:22

## 2019-01-01 RX ADMIN — INSULIN DETEMIR 4 UNITS: 100 INJECTION, SOLUTION SUBCUTANEOUS at 17:40

## 2019-01-01 RX ADMIN — Medication 250 MG: at 08:06

## 2019-01-01 RX ADMIN — ATORVASTATIN CALCIUM 10 MG: 20 TABLET, FILM COATED ORAL at 08:43

## 2019-01-01 RX ADMIN — ISOSORBIDE DINITRATE 10 MG: 10 TABLET ORAL at 20:45

## 2019-01-01 RX ADMIN — HYDRALAZINE HYDROCHLORIDE 25 MG: 25 TABLET ORAL at 17:40

## 2019-01-01 RX ADMIN — INSULIN LISPRO 3 UNITS: 100 INJECTION, SOLUTION INTRAVENOUS; SUBCUTANEOUS at 11:34

## 2019-01-01 RX ADMIN — FERROUS SULFATE TAB 325 MG (65 MG ELEMENTAL FE) 325 MG: 325 (65 FE) TAB at 08:43

## 2019-01-01 RX ADMIN — ISOSORBIDE DINITRATE 10 MG: 10 TABLET ORAL at 10:16

## 2019-01-01 RX ADMIN — METOPROLOL TARTRATE 25 MG: 25 TABLET ORAL at 21:51

## 2019-01-01 RX ADMIN — LACTOBACILLUS ACIDOPHILUS / LACTOBACILLUS BULGARICUS 1 PACKET: 100 MILLION CFU STRENGTH GRANULES at 12:07

## 2019-01-01 RX ADMIN — LACTOBACILLUS ACIDOPHILUS / LACTOBACILLUS BULGARICUS 1 PACKET: 100 MILLION CFU STRENGTH GRANULES at 08:42

## 2019-01-01 RX ADMIN — ERYTHROPOIETIN 5000 UNITS: 10000 INJECTION, SOLUTION INTRAVENOUS; SUBCUTANEOUS at 18:35

## 2019-01-01 RX ADMIN — Medication 150 MG: at 10:15

## 2019-01-01 RX ADMIN — FERROUS SULFATE TAB 325 MG (65 MG ELEMENTAL FE) 325 MG: 325 (65 FE) TAB at 08:51

## 2019-01-01 RX ADMIN — HYDRALAZINE HYDROCHLORIDE 25 MG: 25 TABLET ORAL at 17:16

## 2019-01-01 RX ADMIN — ENOXAPARIN SODIUM 30 MG: 30 INJECTION SUBCUTANEOUS at 08:49

## 2019-01-01 RX ADMIN — POTASSIUM CHLORIDE 20 MEQ: 1500 TABLET, EXTENDED RELEASE ORAL at 12:00

## 2019-01-01 RX ADMIN — Medication 250 MG: at 09:43

## 2019-01-01 RX ADMIN — INSULIN DETEMIR 4 UNITS: 100 INJECTION, SOLUTION SUBCUTANEOUS at 08:04

## 2019-01-01 RX ADMIN — LACTOBACILLUS ACIDOPHILUS / LACTOBACILLUS BULGARICUS 1 PACKET: 100 MILLION CFU STRENGTH GRANULES at 16:53

## 2019-01-01 RX ADMIN — POLYETHYLENE GLYCOL 3350 17 G: 17 POWDER, FOR SOLUTION ORAL at 08:42

## 2019-01-01 RX ADMIN — ISOSORBIDE DINITRATE 10 MG: 10 TABLET ORAL at 23:22

## 2019-01-01 RX ADMIN — HYDRALAZINE HYDROCHLORIDE 25 MG: 25 TABLET ORAL at 17:21

## 2019-01-01 RX ADMIN — SITAGLIPTIN 50 MG: 50 TABLET, FILM COATED ORAL at 12:10

## 2019-01-07 NOTE — PROGRESS NOTES
Progress Note - Ratna Templeton 80 y o  female MRN: [id number]    Unit/Bed#: [facility] 102-01 Encounter: [id number]        Subjective:   Patient seen and examined at bedside after reviewing the chart and discussing the case with the caring staff, reviewing Vitals, Weight and Nursing Notes   Patient was diagnosed to have C diff colitis on 01/03/2019  Patient is currently receiving oral Vancomycin along with yogurt/probiotic  On examination at bedside patient reports that she feels much improved  Patient is still having few small amount lose bowel movements  ROS: All reviewed and are NEGATIVE except above    Current Med:   As per [facility]    Physical Exam     Vitals:  Patient's weight this month was found to be 129 lb  Temperature 97 4° 177 respiratory rate 20 blood pressure 148/84 O2 sats 97% on room air  Constitutional: He appears well-developed  HENT: PERR, EOMI, MMM  Head: Normocephalic and atraumatic  Eyes: Conjunctivae and EOM are normal  Pupils are equal, round, and reactive to light  Neck: Normal range of motion  Neck supple  Cardiovascular: Normal rate and regular rhythm  Pulmonary/Chest: Effort normal and breath sounds normal    GI: Soft, + BS, NonTender, No Gaurding, Rigidity and Rebound Tenderness  Extremities: No Clubbing, Cyanosis or Edema  Neurological: CN II-XII grossly intact, No deficits    Assessment/Plan:  Zully garcia(n) 80y o  year old female for short-term versus long-term rehabilitation with generalized weakness status post C diff colitis     1  Acute C diff colitis  Patient will be continued on vancomycin 125 mg p o  Every 6 hours for 6 more days  2  Generalized weakness with gait abnormality  Patient will receive extensive physical therapy and occupational therapy as a part of rehabilitation      3  Cardiac with history of Hypertension and Hyperlipidemia with a recent NSTEMI type 2 secondary to renal failure   Patient is on Hydralazine 25mg bid, Isosorbide dinitrate 10mg tid, Metoprolol tartrate 25mg bid  4  Type 2 Diabetes Mellitus along with diabetic nephropathy with Stage 2-3 chronic renal failure  Continue Levemir 6 units in the AM and 4 units in the PM along with Tradjenta 5mg once daily with low dose  insulin sliding scale     Patient's hemoglobin A1c was found to be 6 1  Patient is on Level 3 Carb Controlled Diet  Accu Checks ACHS  5  GERD  Continue Pepcid 20mg once daily  6  History of Anemia  Mild at this stage  Continue Ferrous Sulfate 325mg once daily  7  DJD/OA with low back pain  Tylenol as needed for pain  8  Osteopenia/osteoporosis  Calcium carbonate 600mg once daily    9  Dry Eyes  Artificial Tears as needed

## 2019-01-17 NOTE — DISCHARGE SUMMARY
Discharge Summary -   Cuco Cruz 80 y o  female MRN: [id number]  Unit/Bed#: -01 Encounter: [id number]    Admission Date: 12/12/2018     Discharge Date:  01/17/2019    Admitting Diagnosis: UTI (urinary tract infection) [N39 0]  Generalized weakness  Gait disturbance  Status post C diff colitis  Discharge Diagnosis:   Improvement with ambulation  Resolution of C diff colitis    Attending:  Oscar Hancock MD    Consulting Physician(s): None      Hospital Course: Ms Cuco Cruz is 25-year-old who was admitted to [facility] with generalized weakness following C diff colitis  Patient was admitted for short-term rehabilitation she received PT OT to get deconditioning before going back to her personal care home  During the short course of his stay at the nursing home patient had recurrence of C diff colitis  Patient was treated again with p o  vancomycin  Patient's diarrhea has resolved  Patient has also shown improvement with ambulation  Patient was examined at bedside on the day of discharge  Patient seems comfortable going back to her assisted living facility  Patient request all her scripts  Patient will be following up at my office in 1-2 weeks  Patient's vitals and physical exam were found to be within normal limits  Condition at Discharge:  FAIR     Discharge instructions/Information to patient and family:   See after visit summary for information provided to patient and family  Provisions for Follow-Up Care:  See after visit summary for information related to follow-up care and any pertinent home health orders  Disposition: Assisted living/personal care at [facility]   I spent 50 minutes discharging the patient  This time was spent on the day of discharge  I had direct contact with the patient on the day of discharge       Discharge Medications:  See after visit summary for reconciled discharge medications provided to patient and family

## 2019-02-08 PROBLEM — N39.0 COMPLICATED UTI (URINARY TRACT INFECTION): Status: ACTIVE | Noted: 2019-01-01

## 2019-02-08 NOTE — ASSESSMENT & PLAN NOTE
Patient being treated for Clostridium difficile with p o  Vancomycin  Also contributing to of volume loss

## 2019-02-08 NOTE — ASSESSMENT & PLAN NOTE
Complicated urinary tract infection certainly contributing to the patient's acute kidney injury  Treat infection aggressively  Avoid aminoglycosides and dose adjustment for antibiotics according to renal function

## 2019-02-08 NOTE — H&P
Judy Olguin#  CARLOS:[id number] F  MRB:[id number]    FCK:[id number]  Adm Date: 2/8/2019 0902  9:02 AM   ATT PHY: Lorena Nieves, [address] [facility]         Chief Complaint: Altered Mental Status    History of Presenting Illness: Srinivasa Payne is a(n) 80y o  year old female  presenting to [facility] ED for altered mental status and lethargy  The patient does have known history of dementia and baseline confusion, but was noted at the nursing home to not be responding as well as she normally would  Patient was recently started on Ativan and was given a dose last night in the nursing facility  Notable lab work in the ED includes elevated WBC count of 19 00 with left shift (relative neutrophil of 88  Relative lymphocytes of 5), Troponin of 0 14, and evidence of urinary tract infection (innumberable bacteria, innumerable WBC, 4-10 RBC, cloudy urine, 2+ leukocytes, negative nitrites)  Lactic Acid was within normal limits  Blood cultures x2 have been drawn and are pending  The patient had BUN of 49, creatinine of 2 93 , and GFR of 13 which seems to be slightly above her baseline kidney function with BMP in mid-December showing BUN of 23, creatinine of 2 57 and GFR of 16  Patient obtained CT of the head without contrast which was unremarkable  Patient also obtained CXR which showed improvement in pulmonary vascular congestion (now mild) with small left pleural effusion  The patient was seen after reviewing the chart and discussing the case with caring staff  Today during our encounter, the patient was initially sleeping but did wake to verbal/tactile stimulation  She was incomprehensible and unable to provide meaningful history  No Known Allergies    No current facility-administered medications on file prior to encounter        Current Outpatient Prescriptions on File Prior to Encounter   Medication Sig Dispense Refill    atorvastatin (LIPITOR) 10 mg tablet Take 10 mg by mouth daily      calcium carbonate (CALCIUM 600) 600 MG tablet Take 600 mg by mouth daily      cyanocobalamin (VITAMIN B-12) 1,000 mcg tablet Take 1,000 mcg by mouth daily      ferrous sulfate 325 (65 Fe) mg tablet Take 325 mg by mouth daily with breakfast      hydrALAZINE (APRESOLINE) 25 mg tablet Take 25 mg by mouth 2 (two) times a day      insulin detemir (LEVEMIR) 100 units/mL subcutaneous injection Inject 4 Units under the skin 2 (two) times a day        isosorbide dinitrate (ISORDIL) 10 mg tablet Take 10 mg by mouth 3 (three) times a day      lactobacillus acidophilus-bulgaricus (FLORANEX) packet Take 1 packet by mouth 3 (three) times a day with meals 30 packet 0    Linagliptin (TRADJENTA) 5 MG TABS Take 5 mg by mouth daily      Magnesium Chloride (MAG64 PO) Take 1 tablet by mouth daily      metoprolol tartrate (LOPRESSOR) 25 mg tablet Take 25 mg by mouth every 12 (twelve) hours      ondansetron (ZOFRAN) 4 mg tablet Take 4 mg by mouth every 6 (six) hours as needed for nausea or vomiting      pantoprazole (PROTONIX) 40 mg tablet Take 40 mg by mouth daily      polyethylene glycol (MIRALAX) 17 g packet Take 17 g by mouth daily      Polyvinyl Alcohol-Povidone (ARTIFICIAL TEARS) 5-6 MG/ML SOLN Apply 1 drop to eye 4 (four) times a day      acetaminophen (TYLENOL) 650 mg CR tablet Take 650 mg by mouth every 6 (six) hours as needed for mild pain      docusate sodium (COLACE) 100 mg capsule Take 100 mg by mouth 2 (two) times a day as needed for constipation      glucose 4 g chewable tablet Chew 4 tablets (16 g total) as needed for low blood sugar (<70 and responsive/awake) 30 tablet 0    [DISCONTINUED] ondansetron (ZOFRAN) 4 mg/2 mL injection Infuse 2 mL (4 mg total) into a venous catheter every 6 (six) hours as needed for nausea or vomiting 30 mL 0       Active Ambulatory Problems     Diagnosis Date Noted    [facility] kidney [facility] ([facility] [address] ) 12/07/2018    Gastroenteritis 12/07/2018    Nausea & vomiting 12/07/2018    Acute diarrhea 12/07/2018     Resolved Ambulatory Problems     Diagnosis Date Noted    No Resolved Ambulatory Problems     Past Medical History:   Diagnosis Date    Constipation     Dementia     Diabetes mellitus (HCC)     DJD (degenerative joint disease)     Gait abnormality     GERD (gastroesophageal reflux disease)     History of fall     Hypercholesterolemia     Hypertension     Iron deficiency anemia     Low back pain     Vitamin B12 deficiency        History reviewed  No pertinent surgical history  Social History: Unable to update given patient's dementia and altered mental status  Family History: non-contributory at this stage  Review of Systems - unable to conduct ROS given mental status  Vitals:    02/08/19 1338   BP: 117/56   Pulse: 90   Resp: 20   Temp: 99 7 °F (37 6 °C)   SpO2: 93%       Physical Exam   Constitutional: Patient was sleeping upon initial encounter, but did wake to verbal/tactile stimuli  Well-developed and well-nourished  No apparent distress  Patient is incomprehensible  HENT:   Head: Normocephalic and atraumatic  Mouth/Throat: The corner or the patient's lips and tongue demonstrate a black/dark brown appearance  Based on admission information to this point, there was no documented evidence of emesis or bleeding  Mouth examination otherwise made difficult by patient not being cooperative  Eyes: Conjunctivae and EOM are normal  Pupils are equal, round, and reactive to light  Right and left eye exhibits no discharge  Neck: Neck supple  No tracheal deviation present  No thyromegaly present  No lymphadenopathy  Cardiovascular: RRR with normal heart sounds  Exam reveals no friction rub  No murmur heard  Pulmonary/Chest: Effort normal and breath sounds normal  No respiratory distress  Patient has no wheezes, rales, or rhonchi  Abdominal: Soft  Bowel sounds are normal  No distension  There is no apparent tenderness   There is no rebound and no guarding  Neurological: Unable to assess given mental status  Skin: Skin is warm and dry  No rash noted  No diaphoresis  No erythema  No edema  No cyanosis  Assessment     Katie Jones is a(n) 80y o  year old female with UTI with altered mental status and possible sepsis  1  Urinary Tract Infection with altered mental status and possible evidence of sepsis  Patient's criteria for sepsis seem to vary  Respiratory rate was originally elevated but is now WNL; heart rate was >90bpm but is now at around 90bpm; patient is not hypotensive; WBC is elevated at 19 00 but lactic acid is within normal limits; patient does have elevation in creatinine but has known stage 4 chronic kidney disease and therefore organ dysfunction is put into question  Will continue to closely monitor vitals  Continue Ceftriaxone 1g IV q24 hours  Urine culture is pending  2  Acute on Chronic Kidney Disease  Seems to be slightly above baseline during this admission  Will consult Dr All Cortés for his recommendations  3  Cardiac with history of Hypertension and Hyperlipidemia with history of NSTEMI and elevated Troponin on admission  Continue Lipitor 10mg, hydralazine 25mg twice per day, isosorbide dinitrate 10mg three times per day, and metoprolol 25mg twice per day  Elevated Troponin is likely a result of the patient's known renal disease, but we will continue to trend for 2 more occurrences to ensure that it is not markedly increasing  4  Type 2 Diabetes Mellitus with Nephropathy  Continue Januvia 25mg once daily (renally adjusted) along with Levemir 4 units twice per day  I will add low dose sliding scale coverage, with accu checks ACHS  Most recent HgbA1c is 6 1%  5  GERD  Protonix 40mg once daily  6  Constipation  Colace 100mg twice per day as needed  7  History of C  Diff Colitis  Given antibiotic therapy for above UTI, vancomycin 125mg every 12 hours has been ordered as prophylaxis   Continue Floranex three times per day  8  Iron Deficiency Anemia  Continue Ferrous Sulfate 325mg once daily  9  DJD/OA  Tylenol as needed  10  Gait Disturbance  PT/OT will be consulted  11  Osteopenia  Calcium Carbonate 500mg once daily  12  Dry Eyes  Artificial tears as needed  DVT Prophylaxis: Lovenox 30mg (renally adjusted)    Prognosis: Fair  Discharge Plan: In progress  Advanced Directives: I have discussed in detail the patient the advanced directives  The patient does have a POA and does not have a living will  POA and first contact is Latoya Gomez who can be reached at [phone]  When discussing cardiac and pulmonary resuscitation efforts with the patient, per previous conservation with POA, the patient has expressed wishes to be a DNR  I have spent more than 50 minutes gathering data, doing physical examination, and discussing the advanced directives, which was witnessed by caring staff  The patient was discussed with Dr Piedad Mckay and he is in agreement with the above note

## 2019-02-08 NOTE — ASSESSMENT & PLAN NOTE
70-year-old female with stage IV chronic renal failure at baseline on the basis of urinary tract infection/dehydration  There is always the possibility of the patient becoming hemodynamically compromised while the patient was at the personal care home  Serum creatinine 2 months ago was 2 57  On presentation to our emergency room creatinine was noted to be 2 93  This is a minimal elevation which does not meet criteria for acute kidney injury of a rise of creatinine at least 0 5 mL/min  Will trend chemistries over the next couple days for improvement or worsening of her renal function  Patient is on no nephrotoxins and is hemodynamically stable with relatively normal blood pressures and mean arterial pressures remain greater than 60  For now, would certainly avoid nonsteroidal anti-inflammatory drugs, contrast studies, and dose adjusted antibiotics for her degree of renal failure  Avoid aminoglycosides as well  There are no acid-base or electrolyte derangements that require immediate correction

## 2019-02-08 NOTE — ASSESSMENT & PLAN NOTE
Nausea and vomiting not quantitated in the days preceding her admission here but certainly may be contributing to her volume depletion which of course would be contributing to her acute kidney injury  This will be treated aggressively but with care considering her fragile state overall  Chest x-ray suggesting improved although remains present pulmonary vascular congestion

## 2019-02-08 NOTE — CONSULTS
VTE Prophylaxis: Enoxaparin (Lovenox)      Recommendations for Discharge:  · Per Primary Service    Counseling / Coordination of Care Time: 45 minutes  Greater than 50% of total time spent on patient counseling and coordination of care  History of Present Illness:  Chapis Carrasco is a 80 y o  female who is originally admitted to the emergency room service due to altered mental status    We are consulted for rising BUN and creatinine above the patient's baseline  Patient with a long history of chronic renal failure largely on the basis of hypertensive and diabetic glomerulosclerosis  Patient's baseline creatinine approximately 2 5  On presentation to the emergency room creatinine was 2 9  Patient was admitted to the medical-surgical unit with a provisional diagnosis of urosepsis  The patient was cultured and empirically started on Rocephin  The patient is also being treated for C diff colitis with p o  Vancomycin  Medications reviewed laboratory studies reviewed x-ray studies reviewed  Review of Systems:  Review of Systems   Unable to perform ROS: Dementia   Endocrine: Positive for cold intolerance  Past Medical and Surgical History:   Past Medical History:   Diagnosis Date    Constipation     Dementia     Diabetes mellitus ([facility] [address] )     DJD (degenerative joint disease)     Gait abnormality     GERD (gastroesophageal reflux disease)     History of fall     Hypercholesterolemia     Hypertension     Iron deficiency anemia     Low back pain     Vitamin B12 deficiency        History reviewed  No pertinent surgical history  Meds/Allergies:  all medications and allergies reviewed    Allergies: No Known Allergies    Social History:     Marital Status:      Substance Use History:   History   Alcohol Use No     History   Smoking Status    Unknown If Ever Smoked   Smokeless Tobacco    Never Used     History   Drug Use No       Family History:  I have reviewed the patients family history    Physical Exam:   Vitals:   Blood Pressure: 112/62 (02/08/19 1750)  Pulse: 85 (02/08/19 1524)  Temperature: 98 7 °F (37 1 °C) (02/08/19 1524)  Temp Source: Temporal (02/08/19 1524)  Respirations: 17 (02/08/19 1524)  Height: 5' 2" (157 5 cm) (02/08/19 0909)  Weight - Scale: 55 6 kg (122 lb 9 2 oz) (02/08/19 0909)  SpO2: 97 % (02/08/19 1524)    Physical Exam   Constitutional: She appears well-developed and well-nourished  HENT:   Head: Normocephalic and atraumatic  Eyes: Pupils are equal, round, and reactive to light  Conjunctivae and EOM are normal    Neck: Normal range of motion  Neck supple  No JVD present  Carotid bruit is present  No thyromegaly present  Cardiovascular: An irregular rhythm present  Tachycardia present  Murmur heard  Systolic murmur is present with a grade of 4/6   Pulmonary/Chest: Effort normal and breath sounds normal  No respiratory distress  She has no rales  Abdominal: Soft  Bowel sounds are normal  She exhibits no distension and no mass  There is no tenderness  Musculoskeletal: Normal range of motion  She exhibits no edema or deformity  Neurological: She is disoriented  No cranial nerve deficit  Skin: Skin is warm and dry  Additional Data:   Lab Results: I have personally reviewed pertinent reports          Results from last 7 days  Lab Units 02/08/19  1459 02/08/19  0932   WBC Thousand/uL  --  19 00*   HEMOGLOBIN g/dL  --  10 4*   HEMATOCRIT %  --  32 6*   PLATELETS Thousands/uL 168 173   NEUTROS PCT %  --  88*   LYMPHS PCT %  --  5*   MONOS PCT %  --  7   EOS PCT %  --  0       Results from last 7 days  Lab Units 02/08/19  0932   POTASSIUM mmol/L 3 6   CHLORIDE mmol/L 107   CO2 mmol/L 27   BUN mg/dL 49*   CREATININE mg/dL 2 93*   CALCIUM mg/dL 9 5   ALK PHOS U/L 52*   ALT U/L 7   AST U/L 12*           Results from last 7 days  Lab Units 02/08/19  1459 02/08/19  0932   TROPONIN I ng/mL 0 22* 0 14*     Lab Results   Component Value Date/Time    HGBA1C 6 1 12/07/2018 05:43 PM       Results from last 7 days  Lab Units 02/08/19  1548   POC GLUCOSE mg/dl 154*       Imaging: I have personally reviewed pertinent reports  CT head without contrast   Final Result by Georgia Thapa MD (02/08 1006)      No acute intracranial abnormality  Workstation performed: [id number]         XR chest 1 view portable   Final Result by Dave 6, DO (02/08 2946)      Improved appearance of pulmonary vascular congestion now mild in degree with small left pleural effusion  Workstation performed: [id number]             EKG, Pathology, and Other Studies Reviewed on Admission:   · EKG:  Pending    ** Please Note: This note has been constructed using a voice recognition system  **    Consult- Leslie Hernandez 4/7/1927, 80 y o  female MRN: [id number]    Unit/Bed#: -02 Encounter: [id number]    Primary Care Provider: Abhijit Naranjo MD   Date and time admitted to hospital: 2/8/2019  9:02 AM      Consults    Acute diarrhea   Assessment & Plan    Patient being treated for Clostridium difficile with p o  Vancomycin  Also contributing to of volume loss  Nausea & vomiting   Assessment & Plan    Nausea and vomiting not quantitated in the days preceding her admission here but certainly may be contributing to her volume depletion which of course would be contributing to her acute kidney injury  This will be treated aggressively but with care considering her fragile state overall  Chest x-ray suggesting improved although remains present pulmonary vascular congestion  Acute kidney injury [facility])   Assessment & Plan    19-year-old female with stage IV chronic renal failure at baseline on the basis of urinary tract infection/dehydration  There is always the possibility of the patient becoming hemodynamically compromised while the patient was at the personal care home  Serum creatinine 2 months ago was 2 57    On presentation to our emergency room creatinine was noted to be 2 93  This is a minimal elevation which does not meet criteria for acute kidney injury of a rise of creatinine at least 0 5 mL/min  Will trend chemistries over the next couple days for improvement or worsening of her renal function  Patient is on no nephrotoxins and is hemodynamically stable with relatively normal blood pressures and mean arterial pressures remain greater than 60  For now, would certainly avoid nonsteroidal anti-inflammatory drugs, contrast studies, and dose adjusted antibiotics for her degree of renal failure  Avoid aminoglycosides as well  There are no acid-base or electrolyte derangements that require immediate correction  * Complicated UTI (urinary tract infection)   Assessment & Plan    Complicated urinary tract infection certainly contributing to the patient's acute kidney injury  Treat infection aggressively  Avoid aminoglycosides and dose adjustment for antibiotics according to renal function

## 2019-02-08 NOTE — ED PROVIDER NOTES
History  Chief Complaint   Patient presents with    Lethargy     Patient from [facility] staff reports patient has increase lethargy than normal, was awake over night and given increase dose of ativan  From SN[facility] for "lethargy", not responding as well as usual, hx Dementia w/baseline confusion  Report given is that patient was recently started on Ativan and last night had insomnia so dose was given  No report of: trauma, fever, cough, vomiting/diarrhea, or clear focus of pain  Prior to Admission Medications   Prescriptions Last Dose Informant Patient Reported? Taking?    LORazepam (ATIVAN) 0 5 mg tablet 2/7/2019 at 2000  Yes Yes   Sig: Take 0 25 mg by mouth every 8 (eight) hours as needed for anxiety   Linagliptin (TRADJENTA) 5 MG TABS 2/8/2019 at Unknown time  Yes Yes   Sig: Take 5 mg by mouth daily   Magnesium Chloride (MAG64 PO) 2/8/2019 at Unknown time  Yes Yes   Sig: Take 1 tablet by mouth daily   Polyvinyl Alcohol-Povidone (ARTIFICIAL TEARS) 5-6 MG/ML SOLN 2/8/2019 at Unknown time  Yes Yes   Sig: Apply 1 drop to eye 4 (four) times a day   acetaminophen (TYLENOL) 650 mg CR tablet Unknown at Unknown time  Yes No   Sig: Take 650 mg by mouth every 6 (six) hours as needed for mild pain   atorvastatin (LIPITOR) 10 mg tablet 2/7/2019 at Unknown time  Yes Yes   Sig: Take 10 mg by mouth daily   calcium carbonate (CALCIUM 600) 600 MG tablet 2/8/2019 at Unknown time  Yes Yes   Sig: Take 600 mg by mouth daily   cyanocobalamin (VITAMIN B-12) 1,000 mcg tablet 2/8/2019 at Unknown time  Yes Yes   Sig: Take 1,000 mcg by mouth daily   docusate sodium (COLACE) 100 mg capsule Unknown at Unknown time  Yes No   Sig: Take 100 mg by mouth 2 (two) times a day as needed for constipation   ferrous sulfate 325 (65 Fe) mg tablet 2/8/2019 at Unknown time  Yes Yes   Sig: Take 325 mg by mouth daily with breakfast   glucose 4 g chewable tablet Unknown at Unknown time  No No   Sig: Chew 4 tablets (16 g total) as needed for low blood sugar (<70 and responsive/awake)   hydrALAZINE (APRESOLINE) 25 mg tablet 2/8/2019 at Unknown time  Yes Yes   Sig: Take 25 mg by mouth 2 (two) times a day   insulin detemir (LEVEMIR) 100 units/mL subcutaneous injection 2/8/2019 at Unknown time  Yes Yes   Sig: Inject 4 Units under the skin 2 (two) times a day     isosorbide dinitrate (ISORDIL) 10 mg tablet 2/8/2019 at Unknown time  Yes Yes   Sig: Take 10 mg by mouth 3 (three) times a day   lactobacillus acidophilus-bulgaricus (FLORANEX) packet 2/8/2019 at Unknown time  No Yes   Sig: Take 1 packet by mouth 3 (three) times a day with meals   metoprolol tartrate (LOPRESSOR) 25 mg tablet 2/8/2019 at Unknown time  Yes Yes   Sig: Take 25 mg by mouth every 12 (twelve) hours   ondansetron (ZOFRAN) 4 mg tablet 2/4/2019  Yes Yes   Sig: Take 4 mg by mouth every 6 (six) hours as needed for nausea or vomiting   pantoprazole (PROTONIX) 40 mg tablet 2/8/2019 at Unknown time  Yes Yes   Sig: Take 40 mg by mouth daily   polyethylene glycol (MIRALAX) 17 g packet 2/8/2019 at Unknown time  Yes Yes   Sig: Take 17 g by mouth daily      Facility-Administered Medications: None       Past Medical History:   Diagnosis Date    Constipation     Dementia     Diabetes mellitus ([address] )     DJD (degenerative joint disease)     Gait abnormality     GERD (gastroesophageal reflux disease)     History of fall     Hypercholesterolemia     Hypertension     Iron deficiency anemia     Low back pain     Vitamin B12 deficiency        History reviewed  No pertinent surgical history  History reviewed  No pertinent family history  I have reviewed and agree with the history as documented  Social History   Substance Use Topics    Smoking status: Unknown If Ever Smoked    Smokeless tobacco: Never Used    Alcohol use No        Review of Systems   Unable to perform ROS: Dementia       Physical Exam  Physical Exam   Constitutional: She appears well-developed and well-nourished     HENT: Nose: Nose normal    Mouth/Throat: Oropharynx is clear and moist  No oropharyngeal exudate  Eyes: Pupils are equal, round, and reactive to light  Conjunctivae and EOM are normal  No scleral icterus  Neck: Normal range of motion  Neck supple  No JVD present  No tracheal deviation present  Cardiovascular: Normal rate and regular rhythm  Murmur heard  Systolic murmur is present with a grade of 2/6   Pulmonary/Chest: Effort normal and breath sounds normal  No respiratory distress  She has no wheezes  She has no rales  Abdominal: Soft  Bowel sounds are normal  There is no tenderness  There is no guarding  Musculoskeletal: Normal range of motion  She exhibits no edema or tenderness  Neurological: She is alert  She is disoriented  No cranial nerve deficit or sensory deficit  She exhibits normal muscle tone  5/5 motor, nl sens   Skin: Skin is warm and dry  Psychiatric: She has a normal mood and affect  Her behavior is normal    Nursing note and vitals reviewed        Vital Signs  ED Triage Vitals [02/08/19 0909]   Temperature Pulse Respirations Blood Pressure SpO2   98 °F (36 7 °C) 88 16 142/75 94 %      Temp Source Heart Rate Source Patient Position - Orthostatic VS BP Location FiO2 (%)   Temporal Monitor Lying Left arm --      Pain Score       No Pain           Vitals:    02/08/19 1145 02/08/19 1338 02/08/19 1524 02/08/19 1750   BP:  117/56 104/62 112/62   Pulse: 103 90 85    Patient Position - Orthostatic VS:  Lying         Visual Acuity  Visual Acuity      Most Recent Value   L Pupil Size (mm)  3   R Pupil Size (mm)  3   L Pupil Shape  Round   R Pupil Shape  Round          ED Medications  Medications   enoxaparin (LOVENOX) subcutaneous injection 30 mg (30 mg Subcutaneous Given 2/8/19 1611)   calcium carbonate (OYSTER SHELL,OSCAL) 500 mg tablet 1 tablet (not administered)   cyanocobalamin (VITAMIN B-12) tablet 1,000 mcg (not administered)   docusate sodium (COLACE) capsule 100 mg (not administered) ferrous sulfate tablet 325 mg (not administered)   hydrALAZINE (APRESOLINE) tablet 25 mg (25 mg Oral Given 2/8/19 1750)   insulin detemir (LEVEMIR) subcutaneous injection 4 Units (4 Units Subcutaneous Given 2/8/19 1751)   isosorbide dinitrate (ISORDIL) tablet 10 mg (10 mg Oral Given 2/8/19 1611)   lactobacillus acidophilus-bulgaricus (FLORANEX) packet 1 packet (1 packet Oral Given 2/8/19 1611)   sitaGLIPtin (JANUVIA) tablet 25 mg (not administered)   metoprolol tartrate (LOPRESSOR) tablet 25 mg (not administered)   ondansetron (ZOFRAN-ODT) dispersible tablet 4 mg (not administered)   pantoprazole (PROTONIX) EC tablet 40 mg (not administered)   polyvinyl alcohol (LIQUIFILM TEARS) 1 4 % ophthalmic solution 1 drop (not administered)   cefTRIAXone (ROCEPHIN) IVPB (premix) 1,000 mg (not administered)   vancomycin (VANCOCIN) oral solution 125 mg (125 mg Oral Given 2/8/19 1611)   acetaminophen (TYLENOL) tablet 650 mg (not administered)   insulin lispro (HumaLOG) 100 units/mL subcutaneous injection 1-5 Units (1 Units Subcutaneous Given 2/8/19 1611)   glucagon (GLUCAGEN) injection 1 mg (not administered)   glucose chewable tablet 16 g (not administered)   sodium chloride 0 9 % bolus 500 mL (0 mL Intravenous Stopped 2/8/19 1127)   cefTRIAXone (ROCEPHIN) IVPB (premix) 1,000 mg (0 mg Intravenous Stopped 2/8/19 1157)       Diagnostic Studies  Results Reviewed     Procedure Component Value Units Date/Time    Urine Microscopic [[id number]]  (Abnormal) Collected:  02/08/19 1033    Lab Status:  Final result Specimen:  Urine from Urine, Straight Cath Updated:  02/08/19 1057     RBC, UA 4-10 (A) /hpf      WBC, UA Innumerable (A) /hpf      Epithelial Cells Occasional /hpf      Bacteria, UA Innumerable (A) /hpf     Urine culture [[id number]] Collected:  02/08/19 1033    Lab Status:   In process Specimen:  Urine from Urine, Straight Cath Updated:  02/08/19 1056    UA w Reflex to Microscopic w Reflex to Culture [[id number]]  (Abnormal) Collected:  02/08/19 1033    Lab Status:  Final result Specimen:  Urine from Urine, Straight Cath Updated:  02/08/19 1045     Color, UA Yellow     Clarity, UA Cloudy (A)     Specific Gravity, UA 1 020     pH, UA 6 0     Leukocytes, UA 2+ (A)     Nitrite, UA Negative     Protein, UA 2+ (A) mg/dl      Glucose, UA Negative mg/dl      Ketones, UA Negative mg/dl      Urobilinogen, UA 0 2 E U /dl      Bilirubin, UA Negative     Blood, UA Trace-Intact (A)    Lactic acid, plasma [[id number]]  (Normal) Collected:  02/08/19 1004    Lab Status:  Final result Specimen:  Blood from Line Updated:  02/08/19 1030     LACTIC ACID 1 5 mmol/L     Narrative:         Result may be elevated if tourniquet was used during collection  Blood culture #2 [[id number]] Collected:  02/08/19 1004    Lab Status: In process Specimen:  Blood from Arm, Left Updated:  02/08/19 1025    Blood culture #1 [[id number]] Collected:  02/08/19 1004    Lab Status:   In process Specimen:  Blood from Line, Venous Updated:  02/08/19 1025    Troponin I [[id number]]  (Abnormal) Collected:  02/08/19 0932    Lab Status:  Final result Specimen:  Blood from Arm, Right Updated:  02/08/19 0959     Troponin I 0 14 (H) ng/mL     Comprehensive metabolic panel [[id number]]  (Abnormal) Collected:  02/08/19 0932    Lab Status:  Final result Specimen:  Blood from Arm, Right Updated:  02/08/19 0959     Sodium 142 mmol/L      Potassium 3 6 mmol/L      Chloride 107 mmol/L      CO2 27 mmol/L      ANION GAP 8 mmol/L      BUN 49 (H) mg/dL      Creatinine 2 93 (H) mg/dL      Glucose 200 (H) mg/dL      Calcium 9 5 mg/dL      AST 12 (L) U/L      ALT 7 U/L      Alkaline Phosphatase 52 (L) U/L      Total Protein 6 2 (L) g/dL      Albumin 3 2 (L) g/dL      Total Bilirubin 1 00 mg/dL      eGFR 13 ml/min/1 73sq m     Narrative:         National Kidney Disease Education Program recommendations are as follows:  GFR calculation is accurate only with a steady state creatinine  Chronic Kidney disease less than 60 ml/min/1 73 sq  meters  Kidney failure less than 15 ml/min/1 73 sq  meters  CBC and differential [[id number]]  (Abnormal) Collected:  02/08/19 0932    Lab Status:  Final result Specimen:  Blood from Arm, Right Updated:  02/08/19 0944     WBC 19 00 (H) Thousand/uL      RBC 3 57 (L) Million/uL      Hemoglobin 10 4 (L) g/dL      Hematocrit 32 6 (L) %      MCV 91 fL      MCH 29 1 pg      MCHC 32 0 g/dL      RDW 15 6 (H) %      MPV 8 8 fL      Platelets 895 Thousands/uL      nRBC 0 /100 WBCs      Neutrophils Relative 88 (H) %      Lymphocytes Relative 5 (L) %      Monocytes Relative 7 %      Eosinophils Relative 0 %      Basophils Relative 1 %      Neutrophils Absolute 16 60 (H) Thousands/µL      Lymphocytes Absolute 0 90 Thousands/µL      Monocytes Absolute 1 40 (H) Thousand/µL      Eosinophils Absolute 0 00 Thousand/µL      Basophils Absolute 0 10 Thousands/µL                  CT head without contrast   Final Result by Anna Butler MD (02/08 1006)      No acute intracranial abnormality  Workstation performed: [id number]         XR chest 1 view portable   Final Result by Rosa Chery DO (02/08 1834)      Improved appearance of pulmonary vascular congestion now mild in degree with small left pleural effusion              Workstation performed: [id number]                    Procedures  ECG 12 Lead Documentation  Date/Time: 2/8/2019 9:30 AM  Performed by: Lety Ryan  Authorized by: Lety Ryan     ECG reviewed by me, the ED Provider: yes    Patient location:  ED  Previous ECG:     Previous ECG:  Compared to current    Similarity:  No change  Comments:      SR at 70 w/PACs, w/non-specific inferior/lateral ST-T wave changes, no STEMI           Phone Contacts  ED Phone Contact    ED Course  ED Course as of Feb 08 1812 Fri Feb 08, 2019   1013 Stable; Cr=2 93 (has hx CKD w/last Cr=2 57 as of 12/12/2018); also has trace trop elevation (today=0 14) w/trace elevations noted historically w/last trop=0 09 as of 12/10/2018); does have new leukocytosis, w/urine pending (likely source)    1125 Stable; has clear UTI so Rocephin IV given; discussed w/SHAUN Del Real and patient accepted for admission to Dr Thorne Poster                                MDM  Number of Diagnoses or Management Options  Diagnosis management comments: Initial Impression: reported AMS "lethargy", is awake and alert in ED, possibly adverse effect of Ativan, will screen with labs to r/o electrolyte abnormality such as hyponatremia and get UA to evaluate for UTI      Disposition  Final diagnoses:   Urinary tract infection   Acute kidney injury ([address] )     Time reflects when diagnosis was documented in both MDM as applicable and the Disposition within this note     Time User Action Codes Description Comment    2/8/2019 11:25 AM Glenn Gravely Add [N39 0] Urinary tract infection     2/8/2019 11:25 AM Marvelyn Gravely Add [N17 9] Acute kidney injury [facility])       ED Disposition     ED Disposition Condition Date/Time Comment    Admit  Fri Feb 8, 2019 11:26 AM Case was discussed with Nasima (SHAUN) and the patient's admission status was agreed to be Admission Status: inpatient status to the service of Dr Thorne Poster           Follow-up Information    None         Current Discharge Medication List      CONTINUE these medications which have NOT CHANGED    Details   atorvastatin (LIPITOR) 10 mg tablet Take 10 mg by mouth daily      calcium carbonate (CALCIUM 600) 600 MG tablet Take 600 mg by mouth daily      cyanocobalamin (VITAMIN B-12) 1,000 mcg tablet Take 1,000 mcg by mouth daily      ferrous sulfate 325 (65 Fe) mg tablet Take 325 mg by mouth daily with breakfast      hydrALAZINE (APRESOLINE) 25 mg tablet Take 25 mg by mouth 2 (two) times a day      insulin detemir (LEVEMIR) 100 units/mL subcutaneous injection Inject 4 Units under the skin 2 (two) times a day        isosorbide dinitrate (ISORDIL) 10 mg tablet Take 10 mg by mouth 3 (three) times a day      lactobacillus acidophilus-bulgaricus [facility]) packet Take 1 packet by mouth 3 (three) times a day with meals  Qty: 30 packet, Refills: 0    Associated Diagnoses: C  difficile colitis      Linagliptin (TRADJENTA) 5 MG TABS Take 5 mg by mouth daily      LORazepam (ATIVAN) 0 5 mg tablet Take 0 25 mg by mouth every 8 (eight) hours as needed for anxiety      Magnesium Chloride (MAG64 PO) Take 1 tablet by mouth daily      metoprolol tartrate (LOPRESSOR) 25 mg tablet Take 25 mg by mouth every 12 (twelve) hours      ondansetron (ZOFRAN) 4 mg tablet Take 4 mg by mouth every 6 (six) hours as needed for nausea or vomiting      pantoprazole (PROTONIX) 40 mg tablet Take 40 mg by mouth daily      polyethylene glycol (MIRALAX) 17 g packet Take 17 g by mouth daily      Polyvinyl Alcohol-Povidone (ARTIFICIAL TEARS) 5-6 MG/ML SOLN Apply 1 drop to eye 4 (four) times a day      acetaminophen (TYLENOL) 650 mg CR tablet Take 650 mg by mouth every 6 (six) hours as needed for mild pain      docusate sodium (COLACE) 100 mg capsule Take 100 mg by mouth 2 (two) times a day as needed for constipation      glucose 4 g chewable tablet Chew 4 tablets (16 g total) as needed for low blood sugar (<70 and responsive/awake)  Qty: 30 tablet, Refills: 0    Associated Diagnoses: Diabetes 1 5, 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

## 2019-02-09 NOTE — PLAN OF CARE
Problem: DISCHARGE PLANNING - CARE MANAGEMENT  Goal: Discharge to post-acute care or home with appropriate resources  INTERVENTIONS:  - Conduct assessment to determine patient/family and health care team treatment goals, and need for post-acute services based on payer coverage, community resources, and patient preferences, and barriers to discharge  - Address psychosocial, clinical, and financial barriers to discharge as identified in assessment in conjunction with the patient/family and health care team  - Arrange appropriate level of post-acute services according to patient's   needs and preference and payer coverage in collaboration with the physician and health care team  - Communicate with and update the patient/family, physician, and health care team regarding progress on the discharge plan  - Arrange appropriate transportation to post-acute venues  Return to [facility]  Outcome: Progressing

## 2019-02-09 NOTE — PROGRESS NOTES
Progress Note - Katie Jones 80 y o  female MRN: [id number]    Unit/Bed#: -02 Encounter: [id number]      Assessment:  1  Acute kidney injury superimposed on chronic renal failure stage 4 without uremic signs or symptoms electrolyte or acid-base abnormality  Laboratory studies today reveal essentially same laboratory studies as on admission  With a creatinine of 3 09 today, 2 93 yesterday  BUN 51 from 49 yesterday  Eyes and nose over the last 24 hours reveal 600 cc of total urine output with 670 cc of total intake with a net positive of 70 cc  Obviously insensible losses contribute to significant output the patient therefore is significantly pt on fluids clearly contributing to her current BUN creatinine  Chest x-ray shows mild pulmonary vascular congestion  If IV fluids are given they will have to be given very carefully considering her fragile cardiac state  Have asked the nurses to push p o  Fluids to 1 5 L  1 L will replace insensible losses in 500 for replacement  2  Urinary tract infection currently on IV Rocephin  White count on the CBC today was up to 21 9 with a significant left shift  T-max 99 1°  Urine culture growing out 100,000 colony-forming units of E coli  No sensitivity study at the time of this dictation  Will continue Rocephin until such time sensitivity studies available  3  CHF  Compensated on current medications  4  Diabetes  Treated adequately  5  Iron deficiency anemia/anemia of chronic renal failure  Patient's hemoglobin slightly decreased from admission to 9 2   6  Multiple other comorbidities  Currently stable  Plan:  See above    Subjective:   Demented and unable to offer any chief complaint  No acute issues reported by nursing or Allied staff  Objective:     Vitals: Blood pressure 142/56, pulse 79, temperature 99 1 °F (37 3 °C), temperature source Temporal, resp  rate 18, height 5' 2" (1 575 m), weight 55 6 kg (122 lb 9 2 oz), SpO2 95 %  ,Body mass index is 22 42 kg/m²  Intake/Output Summary (Last 24 hours) at 02/09/19 1804  Last data filed at 02/08/19 2158   Gross per 24 hour   Intake              120 ml   Output                0 ml   Net              120 ml       Physical Exam: /56 (BP Location: Left arm)   Pulse 79   Temp 99 1 °F (37 3 °C) (Temporal)   Resp 18   Ht 5' 2" (1 575 m)   Wt 55 6 kg (122 lb 9 2 oz)   SpO2 95%   BMI 22 42 kg/m²     General Appearance:    Alert, cooperative, no distress, appears stated age   Head:    Normocephalic, without obvious abnormality, atraumatic   Eyes:    Pupils are round regular and equal    Ears:     Nose:    Throat:   Mucous membranes are moist   Neck:   Supple, symmetrical, trachea midline, no adenopathy;     thyroid:  no enlargement/tenderness/nodules; no carotid    bruit or JVD   Back:     Symmetric, no curvature, ROM normal, no CVA tenderness   Lungs:     Clear to auscultation bilaterally, respirations unlabored   Chest Wall:    No tenderness or deformity    Heart:    Regular rate and rhythm, S1 and S2 normal, no murmur, rub   or gallop       Abdomen:     Soft, non-tender, bowel sounds active all four quadrants,     no masses, no organomegaly           Extremities:   Extremities normal, atraumatic, no cyanosis or edema   Pulses:   2+ and symmetric all extremities   Skin:   Skin color, texture, turgor normal, no rashes or lesions   Lymph nodes:   Cervical, supraclavicular, and axillary nodes normal   Neurologic:   Cranial nerves intact        Invasive Devices     Peripheral Intravenous Line            Peripheral IV 02/08/19 Right Antecubital 1 day                Lab, Imaging and other studies: I have personally reviewed pertinent reports      VTE Pharmacologic Prophylaxis: Enoxaparin (Lovenox)

## 2019-02-09 NOTE — PLAN OF CARE
CARDIOVASCULAR - ADULT     Maintains optimal cardiac output and hemodynamic stability Progressing     Absence of cardiac dysrhythmias or at baseline rhythm Progressing        DISCHARGE PLANNING     Discharge to home or other facility with appropriate resources Progressing        GENITOURINARY - ADULT     Maintains or returns to baseline urinary function Progressing     Absence of urinary retention Progressing     Urinary catheter remains patent Progressing        INFECTION - ADULT     Absence or prevention of progression during hospitalization Progressing     Absence of fever/infection during neutropenic period Progressing        Potential for Falls     Patient will remain free of falls Progressing        Prexisting or High Potential for Compromised Skin Integrity     Skin integrity is maintained or improved Progressing        SAFETY ADULT     Maintain or return to baseline ADL function Progressing     Maintain or return mobility status to optimal level Progressing     Patient will remain free of falls Progressing

## 2019-02-09 NOTE — SOCIAL WORK
Attempted to evaluate the pt at the bedside  Pt is very lethargic but arouseable  Pt could not answer any questions  Pt was a recent admission to this hospital   Pt is a current resident of [facility]  TC to [facility] left a message with Jason to determine if the pt is a bedhold  Made an outpt CM referral  CM reviewed d/c planning process including the following: identifying help at home, patient preference for d/c planning needs, availability of treatment team to discuss questions or concerns patient and/or family may have regarding understanding medications and recognizing signs and symptoms once discharged  CM also encouraged patient to follow up with all recommended appointments after discharge  Patient advised of importance for patient and family to participate in managing patients medical well being

## 2019-02-10 NOTE — UTILIZATION REVIEW
Initial Clinical Review    Admission: Date/Time/Statement: 2/8/19 @ 1126   Orders Placed This Encounter   Procedures    Inpatient Admission (expected length of stay for this patient Order details is greater than two midnights)     Standing Status:   Standing     Number of Occurrences:   1     Order Specific Question:   Admitting Physician     Answer:   Ashley Benitez     Order Specific Question:   Level of Care     Answer:   Med Surg [16]     Order Specific Question:   Estimated length of stay     Answer:   More than 2 Midnights     Order Specific Question:   Certification     Answer:   I certify that inpatient services are medically necessary for this patient for a duration of greater than two midnights  See H&P and MD Progress Notes for additional information about the patient's course of treatment  ED: Date/Time/Mode of Arrival:   ED Arrival Information     Expected Arrival Acuity Means of Arrival Escorted By Service Admission Type    - 2/8/2019 09:02 Urgent Ambulance [address] [facility] General Medicine Urgent    Arrival Complaint    Lethargy        Chief Complaint:   Chief Complaint   Patient presents with    Lethargy     Patient from [facility] staff reports patient has increase lethargy than normal, was awake over night and given increase dose of ativan  History of Illness:89 yo female to ED w/ altered mental status and lethargy   From NH   Baseline confusion   Recently started on ativan      ED Vital Signs:   ED Triage Vitals [02/08/19 0909]   Temperature Pulse Respirations Blood Pressure SpO2   98 °F (36 7 °C) 88 16 142/75 94 %      Temp Source Heart Rate Source Patient Position - Orthostatic VS BP Location FiO2 (%)   Temporal Monitor Lying Left arm --      Pain Score       No Pain        Wt Readings from Last 1 Encounters:   02/08/19 55 6 kg (122 lb 9 2 oz)     Vital Signs (abnormal): wnl   Pertinent Labs/Diagnostic Test Results: trop   0 14, BUN creat  49  2 93, gluc 200, ast 12, alk phos  52, total prot  6 2, alb 3 2, wbc 19 00, H&H   10 4  32 6  CT head- wnl   PCXR - Improved appearance of pulmonary vascular congestion now mild in degree with small left pleural effusion  EKG_ NSR w/ PVc   ED Treatment:   Medication Administration from 02/08/2019 0902 to 02/08/2019 1222       Date/Time Order Dose Route Action Action by Comments     02/08/2019 1127 sodium chloride 0 9 % bolus 500 mL 0 mL Intravenous Stopped Brenden J McArdle, RN      02/08/2019 1040 sodium chloride 0 9 % bolus 500 mL 500 mL Intravenous New Bag Guadelupe Stammer McArdle, RN      02/08/2019 1157 cefTRIAXone (ROCEPHIN) IVPB (premix) 1,000 mg 0 mg Intravenous Stopped Guadelupe Stammer McArdle, RN      02/08/2019 1121 cefTRIAXone (ROCEPHIN) IVPB (premix) 1,000 mg 1,000 mg Intravenous New Bag Conchis uQreshi RN         Past Medical/Surgical History: Active Ambulatory Problems     Diagnosis Date Noted    Acute kidney injury ([facility] [address] ) 12/07/2018    Gastroenteritis 12/07/2018    Nausea & vomiting 12/07/2018    Acute diarrhea 12/07/2018       Past Medical History:   Diagnosis Date    Constipation     Dementia     Diabetes mellitus ([facility] [address] )     DJD (degenerative joint disease)     Gait abnormality     GERD (gastroesophageal reflux disease)     History of fall     Hypercholesterolemia     Hypertension     Iron deficiency anemia     Low back pain     Vitamin B12 deficiency      Admitting Diagnosis: Lethargy [R53 83]  Urinary tract infection [N39 0]  Acute kidney injury ([facility] [address] ) [N17 9]  Age/Sex: 80 y o  female  Assessment/Plan:   Ruth Mcginnis is a(n) 80y o  year old female with UTI with altered mental status and possible sepsis      1  Urinary Tract Infection with altered mental status and possible evidence of sepsis  Patient's criteria for sepsis seem to vary   Respiratory rate was originally elevated but is now WNL; heart rate was >90bpm but is now at around 90bpm; patient is not hypotensive; WBC is elevated at 19 00 but lactic acid is within normal limits; patient does have elevation in creatinine but has known stage 4 chronic kidney disease and therefore organ dysfunction is put into question  Will continue to closely monitor vitals  Continue Ceftriaxone 1g IV q24 hours  Urine culture is pending  2  Acute on Chronic Kidney Disease  Seems to be slightly above baseline during this admission  Will consult Dr Pepe Carbajal for his recommendations  3  Cardiac with history of Hypertension and Hyperlipidemia with history of NSTEMI and elevated Troponin on admission  Continue Lipitor 10mg, hydralazine 25mg twice per day, isosorbide dinitrate 10mg three times per day, and metoprolol 25mg twice per day  Elevated Troponin is likely a result of the patient's known renal disease, but we will continue to trend for 2 more occurrences to ensure that it is not markedly increasing  4  Type 2 Diabetes Mellitus with Nephropathy  Continue Januvia 25mg once daily (renally adjusted) along with Levemir 4 units twice per day  I will add low dose sliding scale coverage, with accu checks ACHS  Most recent HgbA1c is 6 1%  5  GERD  Protonix 40mg once daily  6  Constipation  Colace 100mg twice per day as needed  7  History of C  Diff Colitis  Given antibiotic therapy for above UTI, vancomycin 125mg every 12 hours has been ordered as prophylaxis  Continue Floranex three times per day  8  Iron Deficiency Anemia  Continue Ferrous Sulfate 325mg once daily  9  DJD/OA  Tylenol as needed  10  Gait Disturbance  PT/OT will be consulted  11  Osteopenia  Calcium Carbonate 500mg once daily  12  Dry Eyes   Artificial tears as needed      DVT Prophylaxis: Lovenox 30mg (renally adjusted)        Admission Orders:  Scheduled Meds:   Current Facility-Administered Medications:  acetaminophen 650 mg Oral Q6H PRN     calcium carbonate 1 tablet Oral Daily With Breakfast     cefTRIAXone 1,000 mg Intravenous Q24H  Last Rate: 1,000 mg (02/09/19 1034)   cyanocobalamin 1,000 mcg Oral Daily docusate sodium 100 mg Oral BID PRN     enoxaparin 30 mg Subcutaneous Daily     ferrous sulfate 325 mg Oral Daily With Breakfast     glucagon 1 mg Intravenous PRN     glucose 16 g Oral PRN     hydrALAZINE 25 mg Oral BID     insulin detemir 4 Units Subcutaneous BID     insulin lispro 1-5 Units Subcutaneous TID AC     isosorbide dinitrate 10 mg Oral TID     lactobacillus acidophilus-bulgaricus 1 packet Oral TID With Meals     metoprolol tartrate 25 mg Oral Q12H EMIGDIO     ondansetron 4 mg Oral Q8H PRN     pantoprazole 40 mg Oral Daily     polyvinyl alcohol 1 drop Both Eyes Q3H PRN     sitaGLIPtin 25 mg Oral Daily     vancomycin 125 mg Oral Q12H [address]         Fingerstick ac and hs   Cons carb   Nephrology consult   Reg diet   SCD  Tele  2/10  Phos , mg , bmp, cbc   Cl 109  BUN creat  51 3 09, wbc 21 90, H&H   9 2  27 8, plt   140  Serial trop     Nephrology  Note 2/9  1  Acute kidney injury superimposed on chronic renal failure stage 4 without uremic signs or symptoms electrolyte or acid-base abnormality  Laboratory studies today reveal essentially same laboratory studies as on admission  With a creatinine of 3 09 today, 2 93 yesterday  BUN 51 from 49 yesterday  Eyes and nose over the last 24 hours reveal 600 cc of total urine output with 670 cc of total intake with a net positive of 70 cc  Obviously insensible losses contribute to significant output the patient therefore is significantly pt on fluids clearly contributing to her current BUN creatinine  Chest x-ray shows mild pulmonary vascular congestion  If IV fluids are given they will have to be given very carefully considering her fragile cardiac state  Have asked the nurses to push p o  Fluids to 1 5 L  1 L will replace insensible losses in 500 for replacement  2  Urinary tract infection currently on IV Rocephin  White count on the CBC today was up to 21 9 with a significant left shift  T-max 99 1°    Urine culture growing out 100,000 colony-forming units of E coli  No sensitivity study at the time of this dictation  Will continue Rocephin until such time sensitivity studies available  3  CHF  Compensated on current medications  4  Diabetes  Treated adequately  5  Iron deficiency anemia/anemia of chronic renal failure  Patient's hemoglobin slightly decreased from admission to 9 2   6  Multiple other comorbidities  Currently stable

## 2019-02-10 NOTE — PROGRESS NOTES
Progress Note - Julia Godoy 80 y o  female MRN: [id number]    Unit/Bed#: -02 Encounter: [id number]      Assessment:    1  Acute kidney injury superimposed on chronic renal failure stage 4 without uremic signs or symptoms electrolyte or acid-base abnormality  2  Urinary tract infection currently on IV Rocephin  Urine culture shows E coli ESBL in significant colony-forming units resistant to Rocephin  Have ordered Zosyn dosed appropriately for stage IV renal failure  3  CHF  Compensated on current medications  4  Diabetes  Treated adequately  5  Iron deficiency anemia/anemia of chronic renal failure  Patient's hemoglobin slightly decreased from admission to 9 2   6  Multiple other comorbidities  Currently stable  Plan:  Check routine labs tomorrow  Subjective:   Patient unable to offer subjective complaint due to her altered mental status    Objective:     Vitals: Blood pressure 115/56, pulse 76, temperature 99 3 °F (37 4 °C), temperature source Temporal, resp  rate 18, height 5' 2" (1 575 m), weight 55 6 kg (122 lb 9 2 oz), SpO2 97 %  ,Body mass index is 22 42 kg/m²        Intake/Output Summary (Last 24 hours) at 2/10/2019 1806  Last data filed at 2/10/2019 1717  Gross per 24 hour   Intake 730 ml   Output    Net 730 ml       Physical Exam: /56 (BP Location: Left arm)   Pulse 76   Temp 99 3 °F (37 4 °C) (Temporal)   Resp 18   Ht 5' 2" (1 575 m)   Wt 55 6 kg (122 lb 9 2 oz)   SpO2 97%   BMI 22 42 kg/m²     General Appearance:    Alert, cooperative, no distress, appears stated age   Head:    Normocephalic, without obvious abnormality, atraumatic   Eyes:    Pupils are round regular and equal   Ears:     Nose:    Throat:   Mucous membranes moist    Neck:   Supple, symmetrical, trachea midline, no adenopathy;     thyroid:  no enlargement/tenderness/nodules; no carotid    bruit or JVD   Back:     Symmetric, no curvature, ROM normal, no CVA tenderness   Lungs:     Clear to auscultation bilaterally, respirations unlabored   Chest Wall:    No tenderness or deformity    Heart:    Regular rate and rhythm, S1 and S2 normal, harsh murmur,        Abdomen:     Soft, non-tender, bowel sounds active all four quadrants,     no masses, no organomegaly           Extremities:   Extremities normal, atraumatic, no cyanosis or edema   Pulses:   2+ and symmetric all extremities   Skin:   Skin color, texture, turgor normal, no rashes or lesions   Lymph nodes:   Cervical, supraclavicular, and axillary nodes normal   Neurologic:   Cranial nerves intact        Invasive Devices     Peripheral Intravenous Line            Peripheral IV 02/08/19 Right Antecubital 2 days                Lab, Imaging and other studies: I have personally reviewed pertinent reports

## 2019-02-10 NOTE — PHYSICIAN ADVISOR
Current patient class: Inpatient  The patient is currently on Hospital Day: 3 at [address]      The patient was admitted to the hospital at 9850 1142 on 2/8/19 for the following diagnosis:  Lethargy [R53 83]  Urinary tract infection [N39 0]  Acute kidney injury ([address] ) [N17 9]       There is documentation in the medical record of an expected length of stay of at least 2 midnights  The patient is therefore expected to satisfy the 2 midnight benchmark and given the 2 midnight presumption is appropriate for INPATIENT ADMISSION  Given this expectation of a satisfying stay, CMS instructs us that the patient is most often appropriate for inpatient admission under part A provided medical necessity is documented in the chart  After review of the relevant documentation, labs, vital signs and test results, the patient is appropriate for INPATIENT ADMISSION  Admission to the hospital as an inpatient is a complex decision making process which requires the practitioner to consider the patients presenting complaint, history and physical examination and all relevant testing  With this in mind, in this case, the patient was deemed appropriate for INPATIENT ADMISSION  After review of the documentation and testing available at the time of the admission I concur with this clinical determination of medical necessity  Rationale is as follows: The patient is a 80 yrs old Female who presented to the ED at 2/8/2019  9:02 AM with a chief complaint of Lethargy (Patient from [facility] staff reports patient has increase lethargy than normal, was awake over night and given increase dose of ativan  )     Given the need for further hospitalization, and along with the documentation of medical necessity present in the chart, the patient is appropriate for inpatient admission  The patient is expected to satisfy the 2 midnight benchmark, and will require further acute medical care   The patient does have comorbid conditions which increases the risk for significant adverse outcome  Given this the patient is appropriate for inpatient admission  The patients vitals on arrival were ED Triage Vitals [02/08/19 0909]   Temperature Pulse Respirations Blood Pressure SpO2   98 °F (36 7 °C) 88 16 142/75 94 %      Temp Source Heart Rate Source Patient Position - Orthostatic VS BP Location FiO2 (%)   Temporal Monitor Lying Left arm --      Pain Score       No Pain           Past Medical History:   Diagnosis Date    Constipation     Dementia     Diabetes mellitus (HCC)     DJD (degenerative joint disease)     Gait abnormality     GERD (gastroesophageal reflux disease)     History of fall     Hypercholesterolemia     Hypertension     Iron deficiency anemia     Low back pain     Vitamin B12 deficiency      History reviewed  No pertinent surgical history  Consults have been placed to:   IP CONSULT TO NEPHROLOGY    Vitals:    02/09/19 2121 02/09/19 2212 02/10/19 0750 02/10/19 1402   BP: 121/66 104/64 140/63 115/56   BP Location:  Left arm Left arm Left arm   Pulse: 90 92 76 76   Resp:  16 18 18   Temp:  98 4 °F (36 9 °C) 97 6 °F (36 4 °C) 99 3 °F (37 4 °C)   TempSrc:  Temporal Temporal Temporal   SpO2:  93% 94% 97%   Weight:       Height:           Most recent labs:    Recent Labs     02/08/19  0932  02/08/19  1812 02/09/19  0459   WBC 19 00*  --   --  21 90*   HGB 10 4*  --   --  9 2*   HCT 32 6*  --   --  27 8*      < >  --  140*   K 3 6  --   --  3 5   CALCIUM 9 5  --   --  8 9   BUN 49*  --   --  51*   CREATININE 2 93*  --   --  3 09*   TROPONINI 0 14*   < > 0 25*  --    AST 12*  --   --   --    ALT 7  --   --   --    ALKPHOS 52*  --   --   --     < > = values in this interval not displayed         Scheduled Meds:  Current Facility-Administered Medications:  acetaminophen 650 mg Oral Q6H PRN Elif Atkins PA-C    calcium carbonate 1 tablet Oral Daily With Breakfast Yu Mills PA-C cefTRIAXone 1,000 mg Intravenous Q24H Ramvelma SnowEMIGDIO peterson Last Rate: 1,000 mg (02/10/19 1044)   cyanocobalamin 1,000 mcg Oral Daily Marino Atkins PA-C    docusate sodium 100 mg Oral BID PRN Mariluz Casey Atkins, EMIGDIO    enoxaparin 30 mg Subcutaneous Daily Mariluz Casey Atkins, EMIGDIO    ferrous sulfate 325 mg Oral Daily With Breakfast Marino Atkins PA-C    glucagon 1 mg Intravenous PRN Mariluz Casey Atkins, EMIGDIO    glucose 16 g Oral PRN Mariluz Combwilmer Atkins, EMIGDIO    hydrALAZINE 25 mg Oral BID Mariluz [facility] Atkins, EMIGDIO    insulin detemir 4 Units Subcutaneous BID Mariluz Casey Atkins, EMIGDIO    insulin lispro 1-5 Units Subcutaneous TID  Marino Atkins PA-C    isosorbide dinitrate 10 mg Oral TID Mariluz Casey Atkins, EMIGDIO    lactobacillus acidophilus-bulgaricus 1 packet Oral TID With Meals Mariluz Casey Atkins, EMIGDIO    metoprolol tartrate 25 mg Oral Q12H [facility] Marino Atkins PA-C    ondansetron 4 mg Oral Q8H PRN Mariluz Casey Atkins, EMIGDIO    pantoprazole 40 mg Oral Daily Marino Atkins, EMIGDIO    polyvinyl alcohol 1 drop Both Eyes Q3H PRN Mariluz Casey Atkins, EMIGDOI    sitaGLIPtin 25 mg Oral Daily [facility]uzSche[facility] Hospitalwilmer Savannahkeyon, EMIGDIO    vancomycin 125 mg Oral Q12H [facility] Marino Atkins PA-C      Continuous Infusions:   PRN Meds:   acetaminophen    docusate sodium    glucagon    glucose    ondansetron    polyvinyl alcohol    Surgical procedures (if appropriate):

## 2019-02-11 NOTE — PLAN OF CARE
Patient has inadequate intake  Recommend change diet to regular small portions  Offer assistance with feeding and encourage fluid intake when patient alert

## 2019-02-11 NOTE — PLAN OF CARE
Problem: Potential for Falls  Goal: Patient will remain free of falls  Description  INTERVENTIONS:  - Assess patient frequently for physical needs  -  Identify cognitive and physical deficits and behaviors that affect risk of falls  -  [place] fall precautions as indicated by assessment   - Educate patient/family on patient safety including physical limitations  - Instruct patient to call for assistance with activity based on assessment  - Modify environment to reduce risk of injury  - Consider OT/PT consult to assist with strengthening/mobility    Outcome: Progressing     Problem: Prexisting or High Potential for Compromised Skin Integrity  Goal: Skin integrity is maintained or improved  Description  INTERVENTIONS:  - Identify patients at risk for skin breakdown  - Assess and monitor skin integrity  - Assess and monitor nutrition and hydration status  - Monitor labs (i e  albumin)  - Assess for incontinence   - Turn and reposition patient  - Assist with mobility/ambulation  - Relieve pressure over bony prominences  - Avoid friction and shearing  - Provide appropriate hygiene as needed including keeping skin clean and dry  - Evaluate need for skin moisturizer/barrier cream  - Collaborate with interdisciplinary team (i e  Nutrition, Rehabilitation, etc )   - Patient/family teaching   Outcome: Progressing     Problem: CARDIOVASCULAR - ADULT  Goal: Maintains optimal cardiac output and hemodynamic stability  Description  INTERVENTIONS:  - Monitor I/O, vital signs and rhythm  - Monitor for S/S and trends of decreased cardiac output i e  bleeding, hypotension  - Administer and titrate ordered vasoactive medications to optimize hemodynamic stability  - Assess quality of pulses, skin color and temperature  - Assess for signs of decreased coronary artery perfusion - ex   Angina  - Instruct patient to report change in severity of symptoms   Outcome: Progressing  Goal: Absence of cardiac dysrhythmias or at baseline rhythm  Description  INTERVENTIONS:  - Continuous cardiac monitoring, monitor vital signs, obtain 12 lead EKG if indicated  - Administer antiarrhythmic and heart rate control medications as ordered  - Monitor electrolytes and administer replacement therapy as ordered   Outcome: Progressing     Problem: GENITOURINARY - ADULT  Goal: Maintains or returns to baseline urinary function  Description  INTERVENTIONS:  - Assess urinary function  - Encourage oral fluids to ensure adequate hydration  - Administer IV fluids as ordered to ensure adequate hydration  - Administer ordered medications as needed  - Offer frequent toileting  - Follow urinary retention protocol if ordered   Outcome: Progressing  Goal: Absence of urinary retention  Description  INTERVENTIONS:  - Assess patient?s ability to void and empty bladder  - Monitor I/O  - Bladder scan as needed  - Discuss with physician/AP medications to alleviate retention as needed  - Discuss catheterization for long term situations as appropriate   Outcome: Progressing  Goal: Urinary catheter remains patent  Description  INTERVENTIONS:  - Assess patency of urinary catheter  - If patient has a chronic bradshaw, consider changing catheter if non-functioning  - Follow guidelines for intermittent irrigation of non-functioning urinary catheter   Outcome: Progressing     Problem: INFECTION - ADULT  Goal: Absence or prevention of progression during hospitalization  Description  INTERVENTIONS:  - Assess and monitor for signs and symptoms of infection  - Monitor lab/diagnostic results  - Monitor all insertion sites, i e  indwelling lines, tubes, and drains  - Monitor endotracheal (as able) and nasal secretions for changes in amount and color  - [place] appropriate cooling/warming therapies per order  - Administer medications as ordered  - Instruct and encourage patient and family to use good hand hygiene technique  - Identify and instruct in appropriate isolation precautions for identified infection/condition   Outcome: Progressing  Goal: Absence of fever/infection during neutropenic period  Description  INTERVENTIONS:  - Monitor WBC  - Implement neutropenic guidelines   Outcome: Progressing     Problem: SAFETY ADULT  Goal: Patient will remain free of falls  Description  INTERVENTIONS:  - Assess patient frequently for physical needs  -  Identify cognitive and physical deficits and behaviors that affect risk of falls    -  [place] fall precautions as indicated by assessment   - Educate patient/family on patient safety including physical limitations  - Instruct patient to call for assistance with activity based on assessment  - Modify environment to reduce risk of injury  - Consider OT/PT consult to assist with strengthening/mobility    Outcome: Progressing  Goal: Maintain or return to baseline ADL function  Description  INTERVENTIONS:  -  Assess patient's ability to carry out ADLs; assess patient's baseline for ADL function and identify physical deficits which impact ability to perform ADLs (bathing, care of mouth/teeth, toileting, grooming, dressing, etc )  - Assess/evaluate cause of self-care deficits   - Assess range of motion  - Assess patient's mobility; develop plan if impaired  - Assess patient's need for assistive devices and provide as appropriate  - Encourage maximum independence but intervene and supervise when necessary  ¯ Involve family in performance of ADLs  ¯ Assess for home care needs following discharge   ¯ Request OT consult to assist with ADL evaluation and planning for discharge  ¯ Provide patient education as appropriate   Outcome: Progressing  Goal: Maintain or return mobility status to optimal level  Description  INTERVENTIONS:  - Assess patient's baseline mobility status (ambulation, transfers, stairs, etc )    - Identify cognitive and physical deficits and behaviors that affect mobility  - Identify mobility aids required to assist with transfers and/or ambulation (gait belt, sit-to-stand, lift, walker, cane, etc )  - [place] fall precautions as indicated by assessment  - Record patient progress and toleration of activity level on Mobility SBAR; progress patient to next Phase/Stage  - Instruct patient to call for assistance with activity based on assessment  - Request Rehabilitation consult to assist with strengthening/weightbearing, etc    Outcome: Progressing     Problem: DISCHARGE PLANNING  Goal: Discharge to home or other facility with appropriate resources  Description  INTERVENTIONS:  - Identify barriers to discharge w/patient and caregiver  - Arrange for needed discharge resources and transportation as appropriate  - Identify discharge learning needs (meds, wound care, etc )  - Arrange for interpretive services to assist at discharge as needed  - Refer to Case Management Department for coordinating discharge planning if the patient needs post-hospital services based on physician/advanced practitioner order or complex needs related to functional status, cognitive ability, or social support system   Outcome: Progressing     Problem: DISCHARGE PLANNING - CARE MANAGEMENT  Goal: Discharge to post-acute care or home with appropriate resources  Description  INTERVENTIONS:  - Conduct assessment to determine patient/family and health care team treatment goals, and need for post-acute services based on payer coverage, community resources, and patient preferences, and barriers to discharge  - Address psychosocial, clinical, and financial barriers to discharge as identified in assessment in conjunction with the patient/family and health care team  - Arrange appropriate level of post-acute services according to patient's   needs and preference and payer coverage in collaboration with the physician and health care team  - Communicate with and update the patient/family, physician, and health care team regarding progress on the discharge plan  - Arrange appropriate transportation to post-acute venues  Return to [facility]   Outcome: Progressing

## 2019-02-11 NOTE — SOCIAL WORK
I received a call from the pt's daughter today and she would like the pt to go to [facility] when stable for d/c I spoke with Gerry Lorenzana and he diego order pt/ot eval and he signed the dme papers, referral was sent as requested and dme form, cm will continue to follow and assess for any additional needs, d/c plan was discussed at care coordination rounds today,

## 2019-02-11 NOTE — PROGRESS NOTES
Judy Olguin#  IEE:[id number] F  [id number]    [id number]  Adm Date: 2/8/2019 0902  9:02 AM   ATT PHY: Raegan Porras Md  [address] [facility]     The patient was seen after reviewing the chart and discussing the case with caring staff  Today during our encounter, the patient offered no concerns  She is a poor historian  No new issues reported by caring staff  CBC and BMP for today are pending  Objective     Vitals:    02/11/19 0906   BP: 120/60   Pulse: 74   Resp:    Temp:    SpO2:        General Appearance: Awake and Alert  No acute distress  HEENT: Normocephalic, atraumatic  PERRLA, EOMI, MMM  Heart: RRR, holosystolic murmur appreciated in all fields, greatest at mitral region  Normal S1 and S2   Lungs: CTA bilaterally with fair air entry  Abdomen: soft, normoactive bowel sounds, non-distended, non-tender to palpation  Extremities: no erythema, inflammation, edema, cyanosis, or rash/wounds  Assessment     Phill Acosta is a(n) 80y o  year old female with UTI with altered mental status and possible sepsis      1  Urinary Tract Infection (ESBL positive) with altered mental status  Overall improved  Patient seems to be more awake and alert today  Continue Zosyn 3 375g every 12 hours for 4 more days  Continue contact precautions  2  Acute on Chronic Kidney Disease Stage 4  Dr Danny Vidal is on consult  BMP today is pending  Encourage PO fluids conservatively  3  Cardiac with history of Hypertension and Hyperlipidemia with history of NSTEMI and elevated Troponin on admission  Continue Lipitor 10mg, hydralazine 25mg twice per day, isosorbide dinitrate 10mg three times per day, and metoprolol 25mg twice per day  4  Type 2 Diabetes Mellitus with Nephropathy  Continue Januvia 25mg once daily, Levemir 4 units twice per day and low dose sliding scale coverage, with accu checks ACHS  Most recent HgbA1c is 6 1%  5  GERD   Protonix 40mg once daily  6  Constipation  Colace 100mg twice per day as needed  7  History of C  Diff Colitis  Prophylactic vancomycin 125mg every 12 hours    Continue Floranex three times per day  8  Iron Deficiency Anemia  Ferrous Sulfate 325mg once daily  9  DJD/OA  Tylenol as needed  10  Gait Disturbance  PT/OT is on consult  11  Osteopenia  Calcium Carbonate 500mg once daily  12  Dry Eyes  Artificial tears as needed      DVT Prophylaxis: Lovenox 30mg (renally adjusted)    The patient was discussed with Dr Mortimer Pluck and he is in agreement with the above note

## 2019-02-12 NOTE — SOCIAL WORK
I received a call from Tano Mejia at the [facility] and they are recommending that the pt have some rehab before she can come to them, I did place a call to [facility] and explained to her that the pt needs some rehab before she can go to [address] [place] [address], she gave me choices and referrals were sent to [place] and [facility], cm will continue to follow and assess for any additional d/c needs, d/c plan was discussed at care coordination rounds today

## 2019-02-12 NOTE — PROGRESS NOTES
Judy Olguin#  VUS:[birth year] F  [id number]    [id number]  Adm Date: 2/8/2019 0902  9:02 AM   ATT PHY: Raul William Md  [address] [facility] [address]         Subjective     The patient was seen after reviewing the chart and discussing the case with caring staff  Today during our encounter, the patient offered no concerns  She is a poor historian  No new issues reported by caring staff  On review of patient's labs it was found that patient's CBC showed complete resolution of leukocytosis with the white cell count back to normal 6 3 from 20 9 on 02/09/2019  Patient's differential showed neutrophil count of 76 lymphocytes 14 no bands  Patient has been afebrile with heart rate less than 100 since admission  Patient's CMP showed continued improvement of BUN creatinine, patient's BUN was 38 with creatinine of 3 63 today as compared to BUN of 47 with creatinine of 3 66 on 02/11/2019  Objective     Vitals:    02/12/19 0710   BP: 145/86   Pulse: 76   Resp: 16   Temp: 98 8 °F (37 1 °C)   SpO2: 97%       General Appearance: Awake and Alert  No acute distress  HEENT: Normocephalic, atraumatic  PERRLA, EOMI, MMM  Heart: RRR, holosystolic murmur appreciated in all fields, greatest at mitral region  Normal S1 and S2   Lungs: CTA bilaterally with fair air entry  Abdomen: soft, normoactive bowel sounds, non-distended, non-tender to palpation  Extremities: no erythema, inflammation, edema, cyanosis, or rash/wounds  Assessment     Maribel Rich is a(n) 80y o  year old female with UTI with altered mental status and possible sepsis      1  Urinary Tract Infection (ESBL positive) with altered mental status  Overall improved  Patient seems to be more awake and alert today  Continue Zosyn 3 375g every 12 hours for 3 more days  Continue contact precautions  Patient seems to be responding on Zosyn as evidence by complete resolution of leukocytosis      2  Suspicion for sepsis with elevated white cell count on admission, fever  Seems to have resolved  Patient seems to be responding to Zosyn  CBC showed complete resolution of leukocytosis with the white cell count back to normal 6 3 from 20 9 on 02/09/2019  Patient's differential showed neutrophil count of 76 lymphocytes 14 no bands  Patient has been afebrile with heart rate less than 100 since admission  3  Acute on Chronic Kidney Disease Stage 4  Dr Lawrence Salgado is on consult  BMP today is pending  Encourage PO fluids conservatively  4  Cardiac with history of Hypertension and Hyperlipidemia with history of NSTEMI and elevated Troponin on admission  Continue Lipitor 10mg, hydralazine 25mg twice per day, isosorbide dinitrate 10mg three times per day, and metoprolol 25mg twice per day  5  Type 2 Diabetes Mellitus with Nephropathy  Continue Januvia 25mg once daily, Levemir 4 units twice per day and low dose sliding scale coverage, with accu checks ACHS  Most recent HgbA1c is 6 1%  6  GERD  Protonix 40mg once daily  7  Constipation  Colace 100mg twice per day as needed  8  History of C  Diff Colitis  Prophylactic vancomycin 125mg every 12 hours    Continue Floranex three times per day  9  Iron Deficiency Anemia  Ferrous Sulfate 325mg once daily  10  DJD/OA  Tylenol as needed  11  Gait Disturbance  PT/OT is on consult  12  Osteopenia  Calcium Carbonate 500mg once daily  13  Dry Eyes  Artificial tears as needed      DVT Prophylaxis: Lovenox 30mg (renally adjusted)    Disposition:  Patient is scheduled to be discharged to [facility] most likely tomorrow as the weather is bad today

## 2019-02-12 NOTE — SOCIAL WORK
I did send therapy notes to [facility], family wants the pt to be d/c to that faciilty and not [facility], pt may need snf rehab before d/c , will await to hear from [facility]  Cm will continue to follow and d/c plan was discussed at care coordination rounds today

## 2019-02-12 NOTE — PHYSICAL THERAPY NOTE
Physical Therapy Evaluation     Patient's Name: Katie Jones    Admitting Diagnosis  Lethargy [R53 83]  Urinary tract infection [N39 0]  Acute kidney injury ([facility] ) [N17 9]    Problem List  Patient Active Problem List   Diagnosis    [facility]    Gastroenteritis    Nausea & vomiting    Acute diarrhea    Complicated UTI (urinary tract infection)       Past Medical History  Past Medical History:   Diagnosis Date    Constipation     Dementia     Diabetes mellitus ([facility] )     DJD (degenerative joint disease)     Gait abnormality     GERD (gastroesophageal reflux disease)     History of fall     Hypercholesterolemia     Hypertension     Iron deficiency anemia     Low back pain     Vitamin B12 deficiency        Past Surgical History  History reviewed  No pertinent surgical history  02/12/19 0918   Note Type   Note type Eval/Treat   Pain Assessment   Pain Assessment FLACC   Pain Rating: FLACC (Rest) - Face 0   Pain Rating: FLACC (Rest) - Legs 0   Pain Rating: FLACC (Rest) - Activity 0   Pain Rating: FLACC (Rest) - Cry 0   Pain Rating: FLACC (Rest) - Consolability 0   Score: FLACC (Rest) 0   Pain Rating: FLACC (Activity) - Face 1   Pain Rating: FLACC (Activity) - Legs 1   Pain Rating: FLACC (Activity) - Activity 0   Pain Rating: FLACC (Activity) - Cry 1   Pain Rating: FLACC (Activity) - Consolability 0   Score: FLACC (Activity) 3   Home Living   Type of Home Assisted living  ([facility])   Home Layout One level; Access; Able to live on main level with bedroom/bathroom; Performs ADLs on one level;Elevator;Ramped entrance   Bathroom Shower/Tub Walk-in shower   Bathroom Toilet Raised   Bathroom Equipment Grab bars in shower;Grab bars around toilet;Commode; [address]; Wheelchair-manual   Prior Function   Level of Mobile Needs assistance with IADLs; Needs assistance with ADLs and functional mobility   Lives With Facility staff Receives Help From Personal care attendant   ADL Assistance Needs assistance   IADLs Needs assistance   Falls in the last 6 months   (unknown as pt  could not provide 2/2 cognition)   Vocational Retired   Restrictions/Precautions   [place] Bearing Precautions Per Order No   Other Precautions Cognitive; Bed Alarm; Fall Risk   General   Family/Caregiver Present No   Cognition   Overall Cognitive Status Impaired   Arousal/Participation Lethargic   Orientation Level Oriented to place; Disoriented to place; Disoriented to time;Disoriented to situation   Memory Decreased recall of recent events;Decreased short term memory;Decreased recall of biographical information   Following Commands Follows one step commands inconsistently   RUE Assessment   RUE Assessment   (see OT assessment)   LUE Assessment   LUE Assessment   (see OT assessment)   RLE Assessment   RLE Assessment X   Strength RLE   R Hip Flexion 2/5   R Knee Extension 2/5   R Ankle Dorsiflexion 2/5   LLE Assessment   LLE Assessment X   Strength LLE   L Hip Flexion 2/5   L Knee Extension 2/5   L Ankle Dorsiflexion 2/5   Coordination   Movements are Fluid and Coordinated 0   Coordination and Movement Description Incremental mobility 2/2 decreased cognition w/ tactile cues of 2 for 100% of session  Bed Mobility   Supine to Sit 2  Maximal assistance   Additional items Assist x 2; Increased time required;Verbal cues;LE management;HOB elevated   Sit to Supine 2  Maximal assistance   Additional items Assist x 2;Verbal cues;LE management; Increased time required   Additional Comments Patient rested at EOB x 5 minutes w/ intermittent tactile cues to maintain proper positioning  Transfers   Sit to Stand 2  Maximal assistance   Additional items Assist x 2; Increased time required;Verbal cues   Stand to Sit 2  Maximal assistance   Additional items Assist x 2;Verbal cues; Increased time required; Impulsive   Stand pivot Unable to assess  (2/2 pt 's inability to maintain standing >3 seconds)   Ambulation/Elevation   Gait pattern   (DNT 2/2 inability to maintain standing >3 seconds)   Balance   Static Sitting Fair -   Dynamic Sitting Poor -   Static Standing Zero   Endurance Deficit   Endurance Deficit Yes   Activity Tolerance   Activity Tolerance Patient limited by fatigue;Treatment limited secondary to medical complications (Comment)  (2/2 cognition, decreased initiation & engagement)   Nurse Made Aware yes   Assessment   Prognosis Fair   Problem List Decreased strength;Decreased endurance; Impaired balance;Decreased mobility; Decreased coordination; Impaired judgement;Decreased cognition;Decreased safety awareness   Assessment Pt is 80 y o  female seen for PT evaluation s/p admit to [address] [facility] on 4/5/0848 w/ Complicated UTI (urinary tract infection)  PT consulted to assess pt's functional mobility and d/c needs  Order placed for PT eval and tx, w/ activity as tolerated order  Comorbidities affecting pt's physical performance at time of assessment include: weakness, complicated UTI, lethargy, DUSTIN, dementia  PTA, pt was requiring A for mobility and resident of [facility]  Personal factors affecting pt at time of IE include: ambulating w/ assistive device, communication issues, inability to ambulate household distances, inability to navigate community distances, inability to navigate level surfaces w/o external assistance, unable to perform dynamic tasks in community, decreased cognition, decreased initiation and engagement, unable to perform physical activity, limited insight into impairments and inability to perform ADLs  Please find objective findings from PT assessment regarding body systems outlined above with impairments and limitations including weakness, impaired balance, decreased endurance, impaired coordination, gait deviations, decreased activity tolerance, decreased functional mobility tolerance, decreased safety awareness, impaired judgement, fall risk and decreased cognition  The following objective measures performed on IE also reveal limitations: Barthel Index: 15/100  Pt's clinical presentation is currently unstable/unpredictable  Pt to benefit from continued PT tx to address deficits as defined above and maximize level of functional independent mobility and consistency  From PT/mobility standpoint, recommendation at time of d/c would be skilled nursing placement pending progress in order to facilitate return to PLOF  Goals   Patient Goals unknown as pt  could not provide 2/2 decreased engagment, cognition   LTG Expiration Date 02/22/19   Long Term Goal #1 Pt will(dependent on patient's ability to safely participate in interventions) perform bed mobility tasks to mod A of 2 to decrease burden of care  Perform transfers to mod A of 2 to decrease risk of skin breakdown with change of position  Perform ambulation with RW for 15 feet, mod A of 2 to improve activity tolerance  Patient will tolerate AAROM<->PROM BLE's to decrease risk of contractures and facilitate joint proprioception  Patient will demonstrate increased static sitting balance to Fair,  Tactile cues <75% of treatment session to facilitate activation of stabilizing muscles and prepare for safe transfers  Treatment Day 1   Plan   Treatment/Interventions Functional transfer training;LE strengthening/ROM; Therapeutic exercise; Endurance training;Cognitive reorientation;Patient/family training;Equipment eval/education; Bed mobility;Gait training;Spoke to case management;OT   PT Frequency 2-3x/wk; Monitor status   Recommendation   Recommendation Long-term skilled nursing home placement   PT - OK to Discharge Yes  (w/ arranged placement)   Additional Comments Upon conclusion, patient was resting comfortably in bed with all needs within reach     Barthel Index   Feeding 0   Bathing 0   Grooming Score 0   Dressing Score 0   Bladder Score 5   Bowels Score 5   Toilet Use Score 0   Transfers (Bed/Chair) Score 5   Mobility (Level Surface) Score 0   Stairs Score 0   Barthel Index Score 15     Additional skilled interventions: Therapeutic Activity x 10 minutes   TA included bed mobility, supine<->sit transfers, sit<->stand transfer, static/dynamic sitting balance, and consistent verbal/tactile cues of 2 to maintain safety and provide cognitive re-orientation  Upon conclusion, patient  Returned BTB w/ all needs within reach        Herminia Aleman, PT

## 2019-02-12 NOTE — PLAN OF CARE
Problem: OCCUPATIONAL THERAPY ADULT  Goal: Performs self-care activities at highest level of function for planned discharge setting  See evaluation for individualized goals  Description  Treatment Interventions: ADL retraining, Functional transfer training, UE strengthening/ROM, Endurance training, Cognitive reorientation, Patient/family training, Activityengagement          See flowsheet documentation for full assessment, interventions and recommendations  Note:   Limitation: Decreased ADL status, Decreased Safe judgement during ADL, Decreased UE strength, Decreased cognition, Decreased endurance, Decreased self-care trans  Prognosis: Fair  Assessment: Pt is a 80 y o  female seen for OT evaluation s/p admit to [facility] on 9/1/2100 w/ Complicated UTI (urinary tract infection)  Comorbidities affecting pt's functional performance at time of assessment include: DUSTIN, N & V, Acute diarrhea  Personal factors affecting pt at time of IE include:difficulty performing ADLS  Prior to admission, pt was residing at [facility], full details re: LOC uncertain  Upon evaluation: Pt requires max to total assistance with self care and functional transfers at this time r/t the following deficits impacting occupational performance: weakness, decreased strength, decreased balance, decreased tolerance and decreased safety awareness  Pt to benefit from continued skilled OT tx while in the hospital to address deficits as defined above and maximize level of functional independence w ADL's and functional mobility  Occupational Performance areas to address include: eating, grooming, bathing/shower, dressing and functional mobility  From OT standpoint, recommendation at time of d/c would be STR/SNF vs return to [facility] with therapy in home

## 2019-02-12 NOTE — PLAN OF CARE
Problem: PHYSICAL THERAPY ADULT  Goal: Performs mobility at highest level of function for planned discharge setting  See evaluation for individualized goals  Description  Treatment/Interventions: Functional transfer training, LE strengthening/ROM, Therapeutic exercise, Endurance training, Cognitive reorientation, Patient/family training, Equipment eval/education, Bed mobility, Gait training, Spoke to case management, OT     See flowsheet documentation for full assessment, interventions and recommendations  Philip Oleary, PT     Note:   Prognosis: Fair  Problem List: Decreased strength, Decreased endurance, Impaired balance, Decreased mobility, Decreased coordination, Impaired judgement, Decreased cognition, Decreased safety awareness  Assessment: Pt is 80 y o  female seen for PT evaluation s/p admit to [address] on 0/7/1042 w/ Complicated UTI (urinary tract infection)  PT consulted to assess pt's functional mobility and d/c needs  Order placed for PT eval and tx, w/ activity as tolerated order  Comorbidities affecting pt's physical performance at time of assessment include: weakness, complicated UTI, lethargy, DUSTIN, dementia  PTA, pt was requiring A for mobility and resident of [facility]  Personal factors affecting pt at time of IE include: ambulating w/ assistive device, communication issues, inability to ambulate household distances, inability to navigate community distances, inability to navigate level surfaces w/o external assistance, unable to perform dynamic tasks in community, decreased cognition, decreased initiation and engagement, unable to perform physical activity, limited insight into impairments and inability to perform ADLs   Please find objective findings from PT assessment regarding body systems outlined above with impairments and limitations including weakness, impaired balance, decreased endurance, impaired coordination, gait deviations, decreased activity tolerance, decreased functional mobility tolerance, decreased safety awareness, impaired judgement, fall risk and decreased cognition  The following objective measures performed on IE also reveal limitations: Barthel Index: 15/100  Pt's clinical presentation is currently unstable/unpredictable  Pt to benefit from continued PT tx to address deficits as defined above and maximize level of functional independent mobility and consistency  From PT/mobility standpoint, recommendation at time of d/c would be skilled nursing placement pending progress in order to facilitate return to PLOF  Recommendation: Long-term skilled nursing home placement     PT - OK to Discharge: Yes(w/ arranged placement)    See flowsheet documentation for full assessment     Marlo Negro, PT

## 2019-02-12 NOTE — OCCUPATIONAL THERAPY NOTE
Occupational Therapy Evaluation      John Watts    2/12/2019    Patient Active Problem List   Diagnosis    Acute kidney injury ([facility] )    Gastroenteritis    Nausea & vomiting    Acute diarrhea    Complicated UTI (urinary tract infection)       Past Medical History:   Diagnosis Date    Constipation     Dementia     Diabetes mellitus ([facility] )     DJD (degenerative joint disease)     Gait abnormality     GERD (gastroesophageal reflux disease)     History of fall     Hypercholesterolemia     Hypertension     Iron deficiency anemia     Low back pain     Vitamin B12 deficiency        History reviewed  No pertinent surgical history  02/12/19 0913   Note Type   Note type Eval/Treat   Restrictions/Precautions   Weight Bearing Precautions Per Order No   Other Precautions Fall Risk;Cognitive; Bed Alarm   Pain Assessment   Pain Assessment No/denies pain   Pain Score No Pain   Home Living   Type of Home Assisted living  [facility])   Home Layout One level; Access; Able to live on main level with bedroom/bathroom; Ramped entrance   Darfur Shower/Tub Walk-in shower   Bathroom Toilet Raised   Bathroom Equipment Grab bars in [facility]; Wheelchair-manual   Prior Function   Level of Ida Needs assistance with IADLs; Needs assistance with ADLs and functional mobility   Lives With Facility staff   Receives Help From Personal care attendant   ADL Assistance Needs assistance   IADLs Needs assistance   Falls in the last 6 months   (uncertain)   Vocational Retired   ADL   Eating Assistance 3  Moderate Assistance  (poor appetite, held styrofoam cup for pt, unable to hold ind)   Grooming Assistance 1  Total Assistance   [address] 1  Total Assistance   [address] 1  Total Assistance   [address] 1  Total Assistance   LB Dressing Assistance 1  Total [address] [place] [address]  1  Total Assistance   Additional Comments pt lethargic, dependent for needs presently   Bed Mobility   Supine to Sit 2  Maximal assistance   Additional items Assist x 2; Increased time required;Verbal cues;HOB elevated;LE management   Sit to Supine 2  Maximal assistance   Additional items Assist x 2;Verbal cues;LE management; Increased time required   Additional Comments pt sat EOB x ~ 5' with intermittent support provided as needed   Transfers   Sit to Stand 2  Maximal assistance   Additional items Assist x 2; Increased time required;Verbal cues   Stand to Sit 2  Maximal assistance   Additional items Assist x 2; Increased time required;Verbal cues   Toilet transfer Unable to assess   Balance   Static Sitting Fair -   Dynamic Sitting Poor -   Static Standing Zero   Activity Tolerance   Activity Tolerance Patient limited by fatigue;Treatment limited secondary to medical complications (Comment)  (lethargic, cognition)   Nurse Made Aware yes   RUE Assessment   RUE Assessment   (PROM grossly WFL's, unable to assess strength)   LUE Assessment   LUE Assessment   (PROM grossly WFL's, unable to assess strength)   Hand Function   Fine Motor Coordination   (grossly WFL's as able to observe)   Cognition   Overall Cognitive Status Impaired   Arousal/Participation Lethargic   Attention Difficulty attending to directions   Orientation Level Oriented to person;Disoriented to place; Disoriented to time;Disoriented to situation   Memory Decreased recall of biographical information;Decreased short term memory;Decreased recall of recent events;Decreased recall of precautions   Following Commands Follows one step commands inconsistently   Comments pt lethatgic, minimal verbalizations/participation   Assessment   Limitation Decreased ADL status; Decreased Safe judgement during ADL;Decreased UE strength;Decreased cognition;Decreased endurance;Decreased self-care trans   Prognosis Fair   Assessment Pt is a 80 y o  female seen for OT evaluation s/p admit to [facility] on 6/6/9180 w/ Complicated UTI (urinary tract infection)  Comorbidities affecting pt's functional performance at time of assessment include: DUSTIN, N & V, Acute diarrhea  Personal factors affecting pt at time of IE include:difficulty performing ADLS  Prior to admission, pt was residing at [facility] full details re: LOC uncertain  Upon evaluation: Pt requires max to total assistance with self care and functional transfers at this time r/t the following deficits impacting occupational performance: weakness, decreased strength, decreased balance, decreased tolerance and decreased safety awareness  Pt to benefit from continued skilled OT tx while in the hospital to address deficits as defined above and maximize level of functional independence w ADL's and functional mobility  Occupational Performance areas to address include: eating, grooming, bathing/shower, dressing and functional mobility  From OT standpoint, recommendation at time of d/c would be STR/SNF vs return to [facility] with therapy in home  Goals   Patient Goals unable to state   STG Time Frame 3-5   Short Term Goal #1 complete feeding assessment with recommendations and treatment to increase ability to self feed to Mon A (or less), with set up and verbal cueing   Short Term Goal #2 iipt will initiate participation in basic/light UB self care   LTG Time Frame 7-10   Long Term Goal #1 increase bed mobility to Mod A X 1, for increase ease of self care   Long Term Goal #2 increase light UB self care to Mod A (or less), for return to PLOF at [facility]   Plan   Treatment Interventions ADL retraining;Functional transfer training;UE strengthening/ROM; Endurance training;Cognitive reorientation;Patient/family training; Activityengagement   Goal Expiration Date 02/22/19   Treatment Day 0   OT Frequency 3-5x/wk   Barthel Index   Feeding 0   Bathing 0   Grooming Score 0   Dressing Score 0   Bladder Score 5   Bowels Score 5   Toilet Use Score 0   Transfers (Bed/Chair) Score 5   Mobility (Level Surface) Score 0 Stairs Score 0   Barthel Index Score 15   Mikie Davis, [place]

## 2019-02-12 NOTE — UTILIZATION REVIEW
Continued Stay Review    Date: 2/12/19 INPATIENT    Vital Signs: /62   Pulse 76   Temp 97 9 °F (36 6 °C) (Tympanic)   Resp 16   Ht 5' 2" (1 575 m)   Wt 55 6 kg (122 lb 9 2 oz)   SpO2 97%   BMI 22 42 kg/m²      Assessment/Plan: 81 y/o female initially admitted as inpatient on 2/8 due to altered mental status, UTI, possible sepsis  1  Urinary Tract Infection (ESBL positive) with altered mental status  Overall improved  Patient seems to be more awake and alert today  Continue Zosyn 3 375g every 12 hours for 3 more days  Continue contact precautions  Patient seems to be responding on Zosyn as evidence by complete resolution of leukocytosis  2  Suspicion for sepsis with elevated white cell count on admission, fever  Seems to have resolved  Patient seems to be responding to Zosyn  CBC showed complete resolution of leukocytosis with the white cell count back to normal 6 3 from 20 9 on 02/09/2019  Patient's differential showed neutrophil count of 76 lymphocytes 14 no bands  Patient has been afebrile with heart rate less than 100 since admission  3  Acute on Chronic Kidney Disease Stage 4  Dr Jaci Huston is on consult  BMP today is pending  Encourage PO fluids conservatively  4  Cardiac with history of Hypertension and Hyperlipidemia with history of NSTEMI and elevated Troponin on admission  Continue Lipitor 10mg, hydralazine 25mg twice per day, isosorbide dinitrate 10mg three times per day, and metoprolol 25mg twice per day  5  Type 2 Diabetes Mellitus with Nephropathy  Continue Januvia 25mg once daily, Levemir 4 units twice per day and low dose sliding scale coverage, with accu checks ACHS  Most recent HgbA1c is 6 1%  6  GERD  Protonix 40mg once daily  7  Constipation  Colace 100mg twice per day as needed  8  History of C  Diff Colitis  Prophylactic vancomycin 125mg every 12 hours    Continue Floranex three times per day  9  Iron Deficiency Anemia  Ferrous Sulfate 325mg once daily  10  DJD/OA  Tylenol as needed  11  Gait Disturbance  PT/OT is on consult  12  Osteopenia  Calcium Carbonate 500mg once daily  13  Dry Eyes  Artificial tears as needed      Medications:   Scheduled Meds:   Current Facility-Administered Medications:  acetaminophen 650 mg Oral Q6H PRN   calcium carbonate 1 tablet Oral Daily With Breakfast   cyanocobalamin 1,000 mcg Oral Daily   docusate sodium 100 mg Oral BID PRN   enoxaparin 30 mg Subcutaneous Daily   ferrous sulfate 325 mg Oral Daily With Breakfast   glucagon 1 mg Intravenous PRN   glucose 16 g Oral PRN   hydrALAZINE 25 mg Oral BID   insulin detemir 4 Units Subcutaneous BID   insulin lispro 1-5 Units Subcutaneous TID AC   isosorbide dinitrate 10 mg Oral TID   lactobacillus acidophilus-bulgaricus 1 packet Oral TID With Meals   metoprolol tartrate 25 mg Oral Q12H EMIGDIO   ondansetron 4 mg Oral Q8H PRN   pantoprazole 40 mg Oral Daily   piperacillin-tazobactam 3 375 g Intravenous Q12H   polyvinyl alcohol 1 drop Both Eyes Q3H PRN   sitaGLIPtin 25 mg Oral Daily   vancomycin 125 mg Oral Q12H [address]     Pertinent Labs/Diagnostic Results:   GFR 10  K+ 3 4  Cl 108  BUN/Cr 38/3 63  Ca 8 5    Age/Sex: 80 y o  female     Discharge Plan: TBD          '

## 2019-02-12 NOTE — PROGRESS NOTES
Progress Note - Ruth Mcginnis 80 y o  female MRN: [id number]    Unit/Bed#: -02 Encounter: [id number]      Assessment:  1  Acute kidney injury superimposed on chronic renal failure stage IV baseline  Creatinine continues to rise and is consistent with acute tubular necrosis secondary to volume deficit and urinary tract infection (interstitial nephritis)  She manifests no uremic symptoms  She remains non oliguric which con notes better prognosis  There are no electrolyte or acid-base abnormalities of clinical concern at this time  Continue with careful administration of p o  Fluids and trending chemistries for improvement or worsening of her renal function  I have ordered a renal ultrasound to exclude pyelonephritis or obstructive uropathy contributing to her worsening renal failure  Plan:  See above    Subjective:   Patient is poorly communicative  She can offer no subjective complaint  Objective:     Vitals: Blood pressure 140/62, pulse 76, temperature 97 9 °F (36 6 °C), temperature source Tympanic, resp  rate 16, height 5' 2" (1 575 m), weight 55 6 kg (122 lb 9 2 oz), SpO2 97 %  ,Body mass index is 22 42 kg/m²        Intake/Output Summary (Last 24 hours) at 2/12/2019 1717  Last data filed at 2/12/2019 1242  Gross per 24 hour   Intake 565 ml   Output    Net 565 ml       Physical Exam: /62   Pulse 76   Temp 97 9 °F (36 6 °C) (Tympanic)   Resp 16   Ht 5' 2" (1 575 m)   Wt 55 6 kg (122 lb 9 2 oz)   SpO2 97%   BMI 22 42 kg/m²     General Appearance:    Alert, cooperative, no distress, appears stated age   Head:    Normocephalic, without obvious abnormality, atraumatic   Eyes:    PERRL,   Ears:     Nose:    Throat:    Neck:   Supple, symmetrical, trachea midline, no adenopathy;     thyroid:  no enlargement/tenderness/nodules; no carotid    bruit or JVD   Back:     Symmetric, no curvature, ROM normal, no CVA tenderness   Lungs:     Clear to auscultation bilaterally, respirations unlabored Chest Wall:    No tenderness or deformity    Heart:    Regular rate and rhythm, S1 and S2 normal, no murmur, rub   or gallop       Abdomen:     Soft, non-tender, bowel sounds active all four quadrants,     no masses, no organomegaly           Extremities:   Extremities normal, atraumatic, no cyanosis or edema   Pulses:   2+ and symmetric all extremities   Skin:   Skin color, texture, turgor normal, no rashes or lesions   Lymph nodes:   Cervical, supraclavicular, and axillary nodes normal   Neurologic:   Marked dementia versus toxic metabolic encephalopathy (not knowing the patient baseline mental status)  Invasive Devices     Peripheral Intravenous Line            Peripheral IV 02/11/19 Left Forearm less than 1 day                Lab, Imaging and other studies: I have personally reviewed pertinent reports

## 2019-02-13 NOTE — SOCIAL WORK
Chart reviewed by case management, pt has been accepted to [facility], I called Holly @ 11:50 am to # [phone] and made her aware of this information and I reviewed the IMM with her over the phone, she stated she understood and a copy was sent to her, I spoke with Corby Holcomb and he wants the pt to have another day of iv antibiotics , tentative d/c for tomorrow and I spoke with Jason at the [facility] and made her aware, report needs to be called to [id number] and the pt will be going to rm 115-1, cm will continue to follow and assess for any additional d/c needs, d/c plan was discussed at care coordination rounds today

## 2019-02-13 NOTE — PLAN OF CARE
Problem: Potential for Falls  Goal: Patient will remain free of falls  Description  INTERVENTIONS:  - Assess patient frequently for physical needs  -  Identify cognitive and physical deficits and behaviors that affect risk of falls  -  [place] fall precautions as indicated by assessment   - Educate patient/family on patient safety including physical limitations  - Instruct patient to call for assistance with activity based on assessment  - Modify environment to reduce risk of injury  - Consider OT/PT consult to assist with strengthening/mobility    Outcome: Progressing     Problem: Prexisting or High Potential for Compromised Skin Integrity  Goal: Skin integrity is maintained or improved  Description  INTERVENTIONS:  - Identify patients at risk for skin breakdown  - Assess and monitor skin integrity  - Assess and monitor nutrition and hydration status  - Monitor labs (i e  albumin)  - Assess for incontinence   - Turn and reposition patient  - Assist with mobility/ambulation  - Relieve pressure over bony prominences  - Avoid friction and shearing  - Provide appropriate hygiene as needed including keeping skin clean and dry  - Evaluate need for skin moisturizer/barrier cream  - Collaborate with interdisciplinary team (i e  Nutrition, Rehabilitation, etc )   - Patient/family teaching   Outcome: Progressing     Problem: CARDIOVASCULAR - ADULT  Goal: Maintains optimal cardiac output and hemodynamic stability  Description  INTERVENTIONS:  - Monitor I/O, vital signs and rhythm  - Monitor for S/S and trends of decreased cardiac output i e  bleeding, hypotension  - Administer and titrate ordered vasoactive medications to optimize hemodynamic stability  - Assess quality of pulses, skin color and temperature  - Assess for signs of decreased coronary artery perfusion - ex   Angina  - Instruct patient to report change in severity of symptoms   Outcome: Progressing  Goal: Absence of cardiac dysrhythmias or at baseline rhythm  Description  INTERVENTIONS:  - Continuous cardiac monitoring, monitor vital signs, obtain 12 lead EKG if indicated  - Administer antiarrhythmic and heart rate control medications as ordered  - Monitor electrolytes and administer replacement therapy as ordered   Outcome: Progressing     Problem: GENITOURINARY - ADULT  Goal: Maintains or returns to baseline urinary function  Description  INTERVENTIONS:  - Assess urinary function  - Encourage oral fluids to ensure adequate hydration  - Administer IV fluids as ordered to ensure adequate hydration  - Administer ordered medications as needed  - Offer frequent toileting  - Follow urinary retention protocol if ordered   Outcome: Progressing  Goal: Absence of urinary retention  Description  INTERVENTIONS:  - Assess patient?s ability to void and empty bladder  - Monitor I/O  - Bladder scan as needed  - Discuss with physician/AP medications to alleviate retention as needed  - Discuss catheterization for long term situations as appropriate   Outcome: Progressing  Goal: Urinary catheter remains patent  Description  INTERVENTIONS:  - Assess patency of urinary catheter  - If patient has a chronic bradshaw, consider changing catheter if non-functioning  - Follow guidelines for intermittent irrigation of non-functioning urinary catheter   Outcome: Progressing     Problem: INFECTION - ADULT  Goal: Absence or prevention of progression during hospitalization  Description  INTERVENTIONS:  - Assess and monitor for signs and symptoms of infection  - Monitor lab/diagnostic results  - Monitor all insertion sites, i e  indwelling lines, tubes, and drains  - Monitor endotracheal (as able) and nasal secretions for changes in amount and color  - [place] appropriate cooling/warming therapies per order  - Administer medications as ordered  - Instruct and encourage patient and family to use good hand hygiene technique  - Identify and instruct in appropriate isolation precautions for identified infection/condition   Outcome: Progressing  Goal: Absence of fever/infection during neutropenic period  Description  INTERVENTIONS:  - Monitor WBC  - Implement neutropenic guidelines   Outcome: Progressing     Problem: SAFETY ADULT  Goal: Patient will remain free of falls  Description  INTERVENTIONS:  - Assess patient frequently for physical needs  -  Identify cognitive and physical deficits and behaviors that affect risk of falls    -  [place] fall precautions as indicated by assessment   - Educate patient/family on patient safety including physical limitations  - Instruct patient to call for assistance with activity based on assessment  - Modify environment to reduce risk of injury  - Consider OT/PT consult to assist with strengthening/mobility    Outcome: Progressing  Goal: Maintain or return to baseline ADL function  Description  INTERVENTIONS:  -  Assess patient's ability to carry out ADLs; assess patient's baseline for ADL function and identify physical deficits which impact ability to perform ADLs (bathing, care of mouth/teeth, toileting, grooming, dressing, etc )  - Assess/evaluate cause of self-care deficits   - Assess range of motion  - Assess patient's mobility; develop plan if impaired  - Assess patient's need for assistive devices and provide as appropriate  - Encourage maximum independence but intervene and supervise when necessary  ¯ Involve family in performance of ADLs  ¯ Assess for home care needs following discharge   ¯ Request OT consult to assist with ADL evaluation and planning for discharge  ¯ Provide patient education as appropriate   Outcome: Progressing  Goal: Maintain or return mobility status to optimal level  Description  INTERVENTIONS:  - Assess patient's baseline mobility status (ambulation, transfers, stairs, etc )    - Identify cognitive and physical deficits and behaviors that affect mobility  - Identify mobility aids required to assist with transfers and/or ambulation (gait belt, sit-to-stand, lift, walker, cane, etc )  - [place] fall precautions as indicated by assessment  - Record patient progress and toleration of activity level on Mobility SBAR; progress patient to next Phase/Stage  - Instruct patient to call for assistance with activity based on assessment  - Request Rehabilitation consult to assist with strengthening/weightbearing, etc    Outcome: Progressing     Problem: DISCHARGE PLANNING  Goal: Discharge to home or other facility with appropriate resources  Description  INTERVENTIONS:  - Identify barriers to discharge w/patient and caregiver  - Arrange for needed discharge resources and transportation as appropriate  - Identify discharge learning needs (meds, wound care, etc )  - Arrange for interpretive services to assist at discharge as needed  - Refer to Case Management Department for coordinating discharge planning if the patient needs post-hospital services based on physician/advanced practitioner order or complex needs related to functional status, cognitive ability, or social support system   Outcome: Progressing     Problem: DISCHARGE PLANNING - CARE MANAGEMENT  Goal: Discharge to post-acute care or home with appropriate resources  Description  INTERVENTIONS:  - Conduct assessment to determine patient/family and health care team treatment goals, and need for post-acute services based on payer coverage, community resources, and patient preferences, and barriers to discharge  - Address psychosocial, clinical, and financial barriers to discharge as identified in assessment in conjunction with the patient/family and health care team  - Arrange appropriate level of post-acute services according to patient's   needs and preference and payer coverage in collaboration with the physician and health care team  - Communicate with and update the patient/family, physician, and health care team regarding progress on the discharge plan  - Arrange appropriate transportation to post-acute venues  Return to [facility]   Outcome: Progressing     Problem: Nutrition/Hydration-ADULT  Goal: Nutrient/Hydration intake appropriate for improving, restoring or maintaining nutritional needs  Description  Monitor and assess patient's nutrition/hydration status for malnutrition (ex- brittle hair, bruises, dry skin, pale skin and conjunctiva, muscle wasting, smooth red tongue, and disorientation)  Collaborate with interdisciplinary team and initiate plan and interventions as ordered  Monitor patient's weight and dietary intake as ordered or per policy  Utilize nutrition screening tool and intervene per policy  Determine patient's food preferences and provide high-protein, high-caloric foods as appropriate       INTERVENTIONS:  - Monitor oral intake, urinary output, labs, and treatment plans  - Assess nutrition and hydration status and recommend course of action  - Evaluate amount of meals eaten  - Assist patient with eating if necessary   - Allow adequate time for meals  - Recommend/ encourage appropriate diets, oral nutritional supplements, and vitamin/mineral supplements  - Order, calculate, and assess calorie counts as needed  - Recommend, monitor, and adjust tube feedings and TPN/PPN based on assessed needs  - Assess need for intravenous fluids  - Provide specific nutrition/hydration education as appropriate  - Include patient/family/caregiver in decisions related to nutrition  Outcome: Progressing

## 2019-02-13 NOTE — OCCUPATIONAL THERAPY NOTE
Attempted to gain patient's purposeful participation in treatment but patient was preoccupied with going home, and had poor attention skills and poor response to re-direction  She did briefly accept a comb to comb her hair but did not complete same  Will continue with POC as able       GINA Farrell/KUSUM

## 2019-02-13 NOTE — SOCIAL WORK
Chart reviewed by case management, pt remains on iv antibiotics, [facility], d/c plan for the pt to go for rehab before going to [facility] will be discussed at cre coordiantion rounds today

## 2019-02-13 NOTE — PROGRESS NOTES
Judy Olguin#  QYO:[id number] F  QHD:[id number]    IVP:[id number]  Adm Date: 2/8/2019 0902  9:02 AM   ATT PHY: Maryellen Vale Md  [address]         [facility]     The patient was seen after reviewing the chart and discussing the case with caring staff  Today during our encounter, the patient reported no acute concerns  She is a poor historian, however, and was preoccupied with trying to obtain her shoes  Of note, the patient's kidney function shows modest improvement, with BUN from 38 to 25, creatinine from 3 63 to 3 42 with GFR of 11  Potassium is low at 3 3  Of note, renal US as ordered by Dr Radha Mullins shows no acute process  Objective     Vitals:    02/13/19 0709   BP: 140/82   Pulse: 69   Resp: 16   Temp: (!) 97 4 °F (36 3 °C)   SpO2: 96%       General Appearance: Awake and Alert  No acute distress  Patient is confused  HEENT: Normocephalic, atraumatic  PERRLA, EOMI, MMM  Heart: RRR, no murmurs  Normal S1 and S2   Lungs: CTA bilaterally with fair air entry  Assessment     Brenda garcia(n) 80y o  year old female with UTI with altered mental status and possible sepsis      1  Urinary Tract Infection (ESBL positive) with altered mental status  Overall improved  Patient seems to be more awake and alert today  Continue Zosyn 3 375g every 12 hours for 2 more days  Continue contact precautions  2  Suspicion for sepsis with elevated white cell count on admission, fever  Seems to have resolved  Patient seems to be responding to Zosyn  CBC showed complete resolution of leukocytosis with the white cell count back to normal 6 3 from 20 9 on 02/09/2019  Patient's differential showed neutrophil count of 76 lymphocytes 14 no bands  Patient has been afebrile with heart rate less than 100 since admission  3  Acute on Chronic Kidney Disease Stage 4  Dr Radha Mullins is on consult  BMP shows modest improvement in renal function   Encourage PO fluids conservatively  4  Cardiac with history of Hypertension and Hyperlipidemia with history of NSTEMI and elevated Troponin on admission  Continue Lipitor 10mg, hydralazine 25mg twice per day, isosorbide dinitrate 10mg three times per day, and metoprolol 25mg twice per day  5  Type 2 Diabetes Mellitus with Nephropathy  Continue Januvia 25mg once daily, Levemir 4 units twice per day and low dose sliding scale coverage, with accu checks [facility]  Most recent HgbA1c is 6 1%  6  GERD  Protonix 40mg once daily  7  Constipation  Colace 100mg twice per day as needed  8  History of C  Diff Colitis  Prophylactic vancomycin 125mg every 12 hours    Continue Floranex three times per day  9  Iron Deficiency Anemia  Ferrous Sulfate 325mg once daily  10  DJD/OA  Tylenol as needed  11  Gait Disturbance  PT/OT is on consult  12  Osteopenia  Calcium Carbonate 500mg once daily  13  Dry Eyes  Artificial tears as needed  14  Hypokalemia  I have ordered a one time dose of KCl 20MEq      DVT Prophylaxis: Lovenox 30mg (renally adjusted)     Disposition:  Patient is scheduled to be discharged to [facility] for STR before going to [facility] most likely tomorrow as I wish to continue her IV Zosyn to completion and monitor for continued improvement in renal function  The patient was discussed with Dr Mary Beth Salvador and he is in agreement with the above note

## 2019-02-13 NOTE — PROGRESS NOTES
Progress Note - Maki Jc 80 y o  female MRN: [id number]    Unit/Bed#: -02 Encounter: [id number]      Assessment:  Patient was examined at the bedside  She is demented and poorly communicative  [facility]ursing at [facility] staff have no acute issues to report over the last 24 hours and the patient was last examined by me  Nursing at [facility] staff report today significant urine output by MA times a need to change the patient  Laboratory studies performed earlier today show minimal improvement in the serum creatinine  Perhaps this is the plateau phase of her ATN (acute kidney injury) and if this should be the case, expect further in steady improvement of creatinine to the patient's baseline  Patient manifests no uremic signs or symptoms  Again there are no acid-base or electrolyte abnormalities of concern with this degree of renal failure  Renal ultrasound was reviewed  There are no findings pointing to obstructive uropathy or pyelonephritis  Plan is to continue with careful administration of p o  Fluids  Insensible losses estimated at approximately 600-800 mL per day and this should be given back at a minimum  Fluid replacement (as opposed to insensible losses) should be replaced at approximately 500 mL daily  Plan:  See above    Subjective:   Unable    Objective:     Vitals: Blood pressure 120/76, pulse 75, temperature 98 8 °F (37 1 °C), temperature source Temporal, resp  rate 16, height 5' 2" (1 575 m), weight 55 6 kg (122 lb 9 2 oz), SpO2 98 %  ,Body mass index is 22 42 kg/m²        Intake/Output Summary (Last 24 hours) at 2/13/2019 1838  Last data filed at 2/13/2019 1221  Gross per 24 hour   Intake 290 ml   Output    Net 290 ml       Physical Exam: /76   Pulse 75   Temp 98 8 °F (37 1 °C) (Temporal)   Resp 16   Ht 5' 2" (1 575 m)   Wt 55 6 kg (122 lb 9 2 oz)   SpO2 98%   BMI 22 42 kg/m²     General Appearance:       Head:    Normocephalic, without obvious abnormality, atraumatic   Eyes: PERRL,    Ears:     Nose:    Throat:    Neck:   Supple, symmetrical, trachea midline, no adenopathy;     thyroid:  no enlargement/tenderness/nodules; no carotid    bruit or JVD   Back:     Symmetric, no curvature, ROM normal, no CVA tenderness   Lungs:     Clear to auscultation bilaterally, respirations unlabored        Heart:    Regular rate and rhythm, S1 and S2 normal, harsh murmur, rub   or gallop       Abdomen:     Soft, non-tender, bowel sounds active all four quadrants,     no masses, no organomegaly           Extremities:   Extremities normal, atraumatic, no cyanosis or edema   Pulses:   2+ and symmetric all extremities   Skin:   Skin color, texture, turgor normal, no rashes or lesions   Lymph nodes:   Cervical, supraclavicular, and axillary nodes normal   Neurologic:    cranial nerves intact        Invasive Devices     Peripheral Intravenous Line            Peripheral IV 02/11/19 Left Forearm 2 days                Lab, Imaging and other studies: I have personally reviewed pertinent reports

## 2019-02-14 PROBLEM — N39.0 UTI DUE TO EXTENDED-SPECTRUM BETA LACTAMASE (ESBL) PRODUCING ESCHERICHIA COLI: Status: ACTIVE | Noted: 2019-01-01

## 2019-02-14 PROBLEM — B96.29 UTI DUE TO EXTENDED-SPECTRUM BETA LACTAMASE (ESBL) PRODUCING ESCHERICHIA COLI: Status: ACTIVE | Noted: 2019-01-01

## 2019-02-14 PROBLEM — Z16.12 UTI DUE TO EXTENDED-SPECTRUM BETA LACTAMASE (ESBL) PRODUCING ESCHERICHIA COLI: Status: ACTIVE | Noted: 2019-01-01

## 2019-02-14 NOTE — SOCIAL WORK
Chart reviewed by case management, d/c order written, [facility] called [facility] and made [facility] aware, rn was given the # for report and rm# [id number] report [id number], I spoke with her daughter Cathi Becerra and made her aware of the d/c , she will come with clothing and sign the paper work tomorrow, pt and family in agreement with the [facility] and then to [facility], d/c plan was discussed at care coordination rounds today

## 2019-02-14 NOTE — PLAN OF CARE
Problem: Potential for Falls  Goal: Patient will remain free of falls  Description  INTERVENTIONS:  - Assess patient frequently for physical needs  -  Identify cognitive and physical deficits and behaviors that affect risk of falls  -  [place] fall precautions as indicated by assessment   - Educate patient/family on patient safety including physical limitations  - Instruct patient to call for assistance with activity based on assessment  - Modify environment to reduce risk of injury  - Consider OT/PT consult to assist with strengthening/mobility    Outcome: Progressing     Problem: Prexisting or High Potential for Compromised Skin Integrity  Goal: Skin integrity is maintained or improved  Description  INTERVENTIONS:  - Identify patients at risk for skin breakdown  - Assess and monitor skin integrity  - Assess and monitor nutrition and hydration status  - Monitor labs (i e  albumin)  - Assess for incontinence   - Turn and reposition patient  - Assist with mobility/ambulation  - Relieve pressure over bony prominences  - Avoid friction and shearing  - Provide appropriate hygiene as needed including keeping skin clean and dry  - Evaluate need for skin moisturizer/barrier cream  - Collaborate with interdisciplinary team (i e  Nutrition, Rehabilitation, etc )   - Patient/family teaching   Outcome: Progressing     Problem: CARDIOVASCULAR - ADULT  Goal: Maintains optimal cardiac output and hemodynamic stability  Description  INTERVENTIONS:  - Monitor I/O, vital signs and rhythm  - Monitor for S/S and trends of decreased cardiac output i e  bleeding, hypotension  - Administer and titrate ordered vasoactive medications to optimize hemodynamic stability  - Assess quality of pulses, skin color and temperature  - Assess for signs of decreased coronary artery perfusion - ex   Angina  - Instruct patient to report change in severity of symptoms   Outcome: Progressing     Problem: CARDIOVASCULAR - ADULT  Goal: Absence of cardiac dysrhythmias or at baseline rhythm  Description  INTERVENTIONS:  - Continuous cardiac monitoring, monitor vital signs, obtain 12 lead EKG if indicated  - Administer antiarrhythmic and heart rate control medications as ordered  - Monitor electrolytes and administer replacement therapy as ordered   Outcome: Progressing     Problem: GENITOURINARY - ADULT  Goal: Maintains or returns to baseline urinary function  Description  INTERVENTIONS:  - Assess urinary function  - Encourage oral fluids to ensure adequate hydration  - Administer IV fluids as ordered to ensure adequate hydration  - Administer ordered medications as needed  - Offer frequent toileting  - Follow urinary retention protocol if ordered   Outcome: Progressing     Problem: GENITOURINARY - ADULT  Goal: Absence of urinary retention  Description  INTERVENTIONS:  - Assess patient?s ability to void and empty bladder  - Monitor I/O  - Bladder scan as needed  - Discuss with physician/AP medications to alleviate retention as needed  - Discuss catheterization for long term situations as appropriate   Outcome: Progressing

## 2019-02-14 NOTE — PLAN OF CARE
Encourage intakes at meals  Recommend ensure compact to improve energy and protein intake for improved skin integrity

## 2019-02-14 NOTE — DISCHARGE SUMMARY
Discharge Summary -   Wayne Henderson 80 y o  female MRN: [id number]  Unit/Bed#: -02 Encounter: [id number]    Admission Date: 2/8/2019     Discharge Date:  02/14/2019    Admitting Diagnosis: Lethargy [R53 83]  Urinary tract infection [N39 0]  Acute kidney injury ([address] ) [N17 9]    Discharge Diagnosis:  Resolving complicated UTI ESBL positive  Improving kidney function with history of chronic stage III kidney disease  Generalized weakness with lethargy    Attending:  Jimmy Khan MD    Consulting Physician(s):   Dr Sydnee Hutchinson nephrologist      [facility] Course:  Ms Wayne Henderson is 26-year-old who was admitted with acute change in mental status along with lethargy  On lab workup patient was noticed to have evidence of urinary tract infection along with a possible urosepsis with elevated white cell count 19,000 and left shift  Patient was started on IV Rocephin  It was found later that patient had ESBL positive UTI  At that time Rocephin was discontinued and patient was started on Zosyn for a total of 5 days  Patient was also noticed to have acute kidney injury with history of chronic stage 3 kidney disease  Patient's BUN on admission was found to be 51 with creatinine of 3 09 which has gone up to 3 63 during this admission  Dr Sydnee Hutchinson nephrologist was consulted  Patient received slow IV fluids with serial monitoring of renal functions  Dr Sydnee Hutchinson reviewed patient's renal ultrasound and monitored the patient closely  Patient's renal function is back to her baseline now with BUN 31 and creatinine 3 12  With history of recurrent C diff colitis patient was on prophylactic does vancomycin p o  Which continued during this admission  Patient's seems to have showed improvement with her renal function  Patient still continues to have generalized weakness  On the day of discharge patient's vitals were reviewed, lab work was reviewed    Patient's physical examination was within normal limits with the exception of confusion with history of dementia  Patient will be discharged back to [facility] today with 1 more day of Zosyn  Condition at Discharge:  FAIR     Discharge instructions/Information to patient and family:   See after visit summary for information provided to patient and family  Provisions for Follow-Up Care:  See after visit summary for information related to follow-up care and any pertinent home health orders  Disposition: Skilled nursing facility at [facility]    Discharge Statement   I spent 50 minutes discharging the patient  This time was spent on the day of discharge  I had direct contact with the patient on the day of discharge  Discharge Medications:  See after visit summary for reconciled discharge medications provided to patient and family

## 2019-02-14 NOTE — PLAN OF CARE
Problem: Potential for Falls  Goal: Patient will remain free of falls  Description  INTERVENTIONS:  - Assess patient frequently for physical needs  -  Identify cognitive and physical deficits and behaviors that affect risk of falls  -  [place] fall precautions as indicated by assessment   - Educate patient/family on patient safety including physical limitations  - Instruct patient to call for assistance with activity based on assessment  - Modify environment to reduce risk of injury  - Consider OT/PT consult to assist with strengthening/mobility    Outcome: Progressing     Problem: Prexisting or High Potential for Compromised Skin Integrity  Goal: Skin integrity is maintained or improved  Description  INTERVENTIONS:  - Identify patients at risk for skin breakdown  - Assess and monitor skin integrity  - Assess and monitor nutrition and hydration status  - Monitor labs (i e  albumin)  - Assess for incontinence   - Turn and reposition patient  - Assist with mobility/ambulation  - Relieve pressure over bony prominences  - Avoid friction and shearing  - Provide appropriate hygiene as needed including keeping skin clean and dry  - Evaluate need for skin moisturizer/barrier cream  - Collaborate with interdisciplinary team (i e  Nutrition, Rehabilitation, etc )   - Patient/family teaching   Outcome: Progressing     Problem: CARDIOVASCULAR - ADULT  Goal: Maintains optimal cardiac output and hemodynamic stability  Description  INTERVENTIONS:  - Monitor I/O, vital signs and rhythm  - Monitor for S/S and trends of decreased cardiac output i e  bleeding, hypotension  - Administer and titrate ordered vasoactive medications to optimize hemodynamic stability  - Assess quality of pulses, skin color and temperature  - Assess for signs of decreased coronary artery perfusion - ex   Angina  - Instruct patient to report change in severity of symptoms   Outcome: Progressing  Goal: Absence of cardiac dysrhythmias or at baseline rhythm  Description  INTERVENTIONS:  - Continuous cardiac monitoring, monitor vital signs, obtain 12 lead EKG if indicated  - Administer antiarrhythmic and heart rate control medications as ordered  - Monitor electrolytes and administer replacement therapy as ordered   Outcome: Progressing     Problem: GENITOURINARY - ADULT  Goal: Maintains or returns to baseline urinary function  Description  INTERVENTIONS:  - Assess urinary function  - Encourage oral fluids to ensure adequate hydration  - Administer IV fluids as ordered to ensure adequate hydration  - Administer ordered medications as needed  - Offer frequent toileting  - Follow urinary retention protocol if ordered   Outcome: Progressing  Goal: Absence of urinary retention  Description  INTERVENTIONS:  - Assess patient?s ability to void and empty bladder  - Monitor I/O  - Bladder scan as needed  - Discuss with physician/AP medications to alleviate retention as needed  - Discuss catheterization for long term situations as appropriate   Outcome: Progressing  Goal: Urinary catheter remains patent  Description  INTERVENTIONS:  - Assess patency of urinary catheter  - If patient has a chronic bradshaw, consider changing catheter if non-functioning  - Follow guidelines for intermittent irrigation of non-functioning urinary catheter   Outcome: Progressing     Problem: INFECTION - ADULT  Goal: Absence or prevention of progression during hospitalization  Description  INTERVENTIONS:  - Assess and monitor for signs and symptoms of infection  - Monitor lab/diagnostic results  - Monitor all insertion sites, i e  indwelling lines, tubes, and drains  - Monitor endotracheal (as able) and nasal secretions for changes in amount and color  - [place] appropriate cooling/warming therapies per order  - Administer medications as ordered  - Instruct and encourage patient and family to use good hand hygiene technique  - Identify and instruct in appropriate isolation precautions for identified infection/condition   Outcome: Progressing  Goal: Absence of fever/infection during neutropenic period  Description  INTERVENTIONS:  - Monitor WBC  - Implement neutropenic guidelines   Outcome: Progressing     Problem: SAFETY ADULT  Goal: Patient will remain free of falls  Description  INTERVENTIONS:  - Assess patient frequently for physical needs  -  Identify cognitive and physical deficits and behaviors that affect risk of falls    -  [place] fall precautions as indicated by assessment   - Educate patient/family on patient safety including physical limitations  - Instruct patient to call for assistance with activity based on assessment  - Modify environment to reduce risk of injury  - Consider OT/PT consult to assist with strengthening/mobility    Outcome: Progressing  Goal: Maintain or return to baseline ADL function  Description  INTERVENTIONS:  -  Assess patient's ability to carry out ADLs; assess patient's baseline for ADL function and identify physical deficits which impact ability to perform ADLs (bathing, care of mouth/teeth, toileting, grooming, dressing, etc )  - Assess/evaluate cause of self-care deficits   - Assess range of motion  - Assess patient's mobility; develop plan if impaired  - Assess patient's need for assistive devices and provide as appropriate  - Encourage maximum independence but intervene and supervise when necessary  ¯ Involve family in performance of ADLs  ¯ Assess for home care needs following discharge   ¯ Request OT consult to assist with ADL evaluation and planning for discharge  ¯ Provide patient education as appropriate   Outcome: Progressing  Goal: Maintain or return mobility status to optimal level  Description  INTERVENTIONS:  - Assess patient's baseline mobility status (ambulation, transfers, stairs, etc )    - Identify cognitive and physical deficits and behaviors that affect mobility  - Identify mobility aids required to assist with transfers and/or ambulation (gait belt, sit-to-stand, lift, walker, cane, etc )  - [place] fall precautions as indicated by assessment  - Record patient progress and toleration of activity level on Mobility SBAR; progress patient to next Phase/Stage  - Instruct patient to call for assistance with activity based on assessment  - Request Rehabilitation consult to assist with strengthening/weightbearing, etc    Outcome: Progressing     Problem: DISCHARGE PLANNING  Goal: Discharge to home or other facility with appropriate resources  Description  INTERVENTIONS:  - Identify barriers to discharge w/patient and caregiver  - Arrange for needed discharge resources and transportation as appropriate  - Identify discharge learning needs (meds, wound care, etc )  - Arrange for interpretive services to assist at discharge as needed  - Refer to Case Management Department for coordinating discharge planning if the patient needs post-hospital services based on physician/advanced practitioner order or complex needs related to functional status, cognitive ability, or social support system   Outcome: Progressing     Problem: DISCHARGE PLANNING - CARE MANAGEMENT  Goal: Discharge to post-acute care or home with appropriate resources  Description  INTERVENTIONS:  - Conduct assessment to determine patient/family and health care team treatment goals, and need for post-acute services based on payer coverage, community resources, and patient preferences, and barriers to discharge  - Address psychosocial, clinical, and financial barriers to discharge as identified in assessment in conjunction with the patient/family and health care team  - Arrange appropriate level of post-acute services according to patient's   needs and preference and payer coverage in collaboration with the physician and health care team  - Communicate with and update the patient/family, physician, and health care team regarding progress on the discharge plan  - Arrange appropriate transportation to post-acute venues    D/c to the [facility] for short term rehab and then to [facility]   Outcome: Progressing     Problem: Nutrition/Hydration-ADULT  Goal: Nutrient/Hydration intake appropriate for improving, restoring or maintaining nutritional needs  Description  Monitor and assess patient's nutrition/hydration status for malnutrition (ex- brittle hair, bruises, dry skin, pale skin and conjunctiva, muscle wasting, smooth red tongue, and disorientation)  Collaborate with interdisciplinary team and initiate plan and interventions as ordered  Monitor patient's weight and dietary intake as ordered or per policy  Utilize nutrition screening tool and intervene per policy  Determine patient's food preferences and provide high-protein, high-caloric foods as appropriate       INTERVENTIONS:  - Monitor oral intake, urinary output, labs, and treatment plans  - Assess nutrition and hydration status and recommend course of action  - Evaluate amount of meals eaten  - Assist patient with eating if necessary   - Allow adequate time for meals  - Recommend/ encourage appropriate diets, oral nutritional supplements, and vitamin/mineral supplements  - Order, calculate, and assess calorie counts as needed  - Recommend, monitor, and adjust tube feedings and TPN/PPN based on assessed needs  - Assess need for intravenous fluids  - Provide specific nutrition/hydration education as appropriate  - Include patient/family/caregiver in decisions related to nutrition  Outcome: Progressing

## 2019-02-21 PROBLEM — A04.72 CLOSTRIDIUM DIFFICILE DIARRHEA: Status: ACTIVE | Noted: 2019-01-01

## 2019-02-26 NOTE — PROGRESS NOTES
2/26/19-sent [facility] email with bundle info and requested updates for ongoing care management throughout bundle episode  Ant Farias

## 2019-03-04 NOTE — PROGRESS NOTES
2/27/19-per Yasmeen Pena pt  has a last skilled day of Friday, March 1, 2019  She will remain here long term as she was not accepted after evaluation by a personal care facility

## 2019-03-11 NOTE — PROGRESS NOTES
3/1/19-Pt  Transitioned to LTC within the [facility]  Will continue to monitor throughout bundle episode

## 2019-03-18 NOTE — PROGRESS NOTES
3/18/19-No response received from [employer] regarding bundle update  Received email to inform this CM of Melissa Vidal leaving the company and to forward emails to Jerald  Sent email to RASHAAD Pete of [employer] at [facility] to clarify , inform of newer bundle patient, and to request any updates for ongoing care management throughout bundle episode

## 2019-05-10 PROBLEM — J81.1 PULMONARY EDEMA: Status: ACTIVE | Noted: 2019-01-01

## 2019-10-14 PROBLEM — R30.0 BURNING WITH URINATION: Status: ACTIVE | Noted: 2019-01-01

## 2019-10-14 NOTE — PROGRESS NOTES
Progress Note - Marshal Xie 80 y o  female MRN: [id number]    Unit/Bed#: Lemuel Lord 112-02 Encounter: [id number]        Subjective:   Patient seen and examined at bedside after reviewing the chart and discussing the case with the caring staff, reviewing Vitals, Weight and Nursing Notes   On examination patient has no acute issues  ROS: All reviewed and are NEGATIVE except above    Current Med:   As per [facility]    Physical Exam     Vitals:  Patient's weight this month was found to be 115 lb which is a gain of 2 8 lb over the past 30 days  Temperature 96 3, heart rate 86, respirations 16, blood pressure 131/56  Constitutional: He appears well-developed  HENT: PERR, EOMI, MMM  Head: Normocephalic and atraumatic  Eyes: Conjunctivae and EOM are normal  Pupils are equal, round, and reactive to light  Neck: Normal range of motion  Neck supple  Cardiovascular: Normal rate and regular rhythm  Pulmonary/Chest: Effort normal and breath sounds normal    GI: Soft, + BS, NonTender, No Gaurding, Rigidity and Rebound Tenderness  Extremities: No Clubbing, Cyanosis or Edema  Neurological: CN II-XII grossly intact, No deficits    Assessment/Plan:  Marshal Xie is a(n) 80y o  year old female long-term resident at [place] [address]    1  Acute on Chronic Kidney Disease Stage 4  Dr Ashley Parrish is on consult  BMP shows modest improvement in renal function  Encourage PO fluids conservatively  2  Pulmonary edema  Seems to have improved with high-dose Lasix at the hospital   3  Cardiac with history of Hypertension and Hyperlipidemia with history of NSTEMI and elevated Troponin on admission  Continue Lipitor 10mg, hydralazine 25mg twice per day, isosorbide dinitrate 10mg three times per day, and metoprolol 25mg twice per day  4  Type 2 Diabetes Mellitus with Nephropathy  Continue Januvia 25mg once daily, Levemir 4 units twice per day and low dose sliding scale coverage, with accu checks [facility]CHS  Most recent HgbA1c is 6 1%    5  GERD  Protonix 40mg once daily  6  Constipation  Colace 100mg twice per day as needed  7  Iron Deficiency Anemia  Ferrous Sulfate 325mg once daily  8  DJD/OA  Tylenol as needed  9  Gait Disturbance  PT/OT is on consult  10  Osteopenia  Calcium Carbonate 500mg once daily  11  Dry Eyes  Artificial tears as needed

## 2019-10-21 PROBLEM — K92.1 BLOOD IN STOOL: Status: ACTIVE | Noted: 2019-01-01

## 2019-11-05 NOTE — PROGRESS NOTES
Progress Note - Albert Tolliver 80 y o  female MRN: [id number]    Unit/Bed#: Wendy Snowden 112-02 Encounter: [id number]        Subjective:   Patient seen and examined at bedside after reviewing the chart and discussing the case with the caring staff, reviewing Vitals, Weight and Nursing Notes   On examination patient has no acute issues  With history of end-stage renal disease along with dementia as per patient's family request patient was evaluated and accepted by [facility]  ROS: All reviewed and are NEGATIVE except above    Current Med:   As per [facility]    Physical Exam     Vitals:  Patient's weight this month was found to be 115 lb  Temperature 96 6, heart rate 78, respirations 16, blood pressure 142/68  Constitutional: He appears well-developed  HENT: PERR, EOMI, MMM  Head: Normocephalic and atraumatic  Eyes: Conjunctivae and EOM are normal  Pupils are equal, round, and reactive to light  Neck: Normal range of motion  Neck supple  Cardiovascular: Normal rate and regular rhythm  Pulmonary/Chest: Effort normal and breath sounds normal    GI: Soft, + BS, NonTender, No Gaurding, Rigidity and Rebound Tenderness  Extremities: No Clubbing, Cyanosis or Edema  Neurological: CN II-XII grossly intact, No deficits    Assessment/Plan:  Albert Tolliver is a(n) 80y o  year old female long-term resident at [place] [address] now under [address] [place] [address] END-STAGE RENAL DISEASE  Patient is under [facility]  Patient is receiving palliative care  Patient may continue to receive morphine sulfate, Ativan, Levsin and Zofran on as needed basis as a part of comfort care  1  Acute on Chronic Kidney Disease Stage 4  Patient will not receive any further blood workup as she is on hospice  2  Cardiac with history of Hypertension and Hyperlipidemia with history of NSTEMI and elevated Troponin on admission,  Type 2 Diabetes Mellitus with Nephropathy, GERD,  Iron Deficiency Anemia    All medications have been discontinued as part of hospice care  3  DJD/OA  Tylenol as needed  4  Constipation  Colace 100mg twice per day as needed

## 2019-11-28 NOTE — PROGRESS NOTES
Progress Note - Magali Morelos 80 y o  female MRN: [id number]    Unit/Bed#: Tano Frausto 112-02 Encounter: [id number]        Subjective:   Patient seen and examined at bedside after reviewing the chart and discussing the case with the caring staff, reviewing Vitals, Weight and Nursing Notes   It was reported that patient is detoriatting fast and seemingly actively dying  Patient examined at bedside patient appears very less responsive to verbals was not able to follow the verbal commands  ROS: All reviewed and are NEGATIVE except above    Current Med:   As per [facility]    Physical Exam     Vitals:  Temperature 96 9°, heart rate 100, respirations 36, blood pressure 100/58, O2 sats 94% on room air  Constitutional: He appears well-developed  HENT: PERR, EOMI, MMM  Head: Normocephalic and atraumatic  Eyes: Conjunctivae and EOM are normal  Pupils are equal, round, and reactive to light  Neck: Normal range of motion  Neck supple  Cardiovascular: Normal rate and regular rhythm  Pulmonary/Chest: Effort normal and breath sounds normal    GI: Soft, + BS, NonTender, No Gaurding, Rigidity and Rebound Tenderness  Extremities: No Clubbing, Cyanosis or Edema  Neurological: CN II-XII grossly intact, No deficits    Assessment/Plan:  Magali Morelos is a(n) 80y o  year old female long-term resident at [place] [address] now under [address] [place] [address] END-STAGE RENAL DISEASE  Patient is under [facility]  Patient is receiving palliative care  Patient may continue to receive morphine sulfate, Ativan, Levsin and Zofran on as needed basis as a part of comfort care  1  Acute on Chronic Kidney Disease Stage 4  Patient will not receive any further blood workup as she is on hospice  2  Cardiac with history of Hypertension and Hyperlipidemia with history of NSTEMI and elevated Troponin on admission,  Type 2 Diabetes Mellitus with Nephropathy, GERD,  Iron Deficiency Anemia    All medications have been discontinued as part of hospice care  3  DJD/OA  Tylenol as needed  4  Constipation  Colace 100mg twice per day as needed

## 2019-12-02 NOTE — DISCHARGE SUMMARY
Discharge Summary -   Cuco Morales 80 y o  female MRN: [id number]  Unit/Bed#: Anuardha Daniel 112-02 Encounter: [id number]    Admission Date: 5/23/2019     Discharge Date:  11/29/2019    Admitting Diagnosis: Urinary tract infection, site not specified [N39 0]  Advanced Alzheimer's dementia  End-stage renal disease    Discharge Diagnosis:  Death    Attending:  Isreal Boucher MD      Nursing home course:  Mr Cuco Morales was a 80year-old long-term resident of [place] [address]  Towards the end of her life patient was put under [facility] hospice due to advanced Alzheimer's dementia with end-stage renal disease  Patient was rapidly deteriorating physically  Patient was examined at bedside less than 24 hours prior to her death as it was reported that patient's condition is due to radiating and seemingly she is actively dying  On examination at bedside patient was unresponsive to verbals  Patient was pronounced dead on 11/29/2019 at 5:17 a m  In the morning  I reviewed patient's chart signed of patient's death certificate and the the paperwork  Condition at Discharge:  FAIR     Discharge instructions/Information to patient and family:   See after visit summary for information provided to patient and family  Provisions for Follow-Up Care:  See after visit summary for information related to follow-up care and any pertinent home health orders  [facility]    Discharge Statement   I spent 50 minutes discharging the patient  This time was spent on the day of discharge  I had direct contact with the patient on the day of discharge  Discharge Medications:  See after visit summary for reconciled discharge medications provided to patient and family

## 2020-06-30 NOTE — PROGRESS NOTES
Progress Note - Chapis Carrasco 80 y o  female MRN: [id number]    Unit/Bed#: -01 Encounter: [id number]      Assessment:  1   C diff colitis  Will treat with p  o  vancomycin  2   Acute on chronic renal failure  Serum creatinine 2 1 in 2015  Continue IV fluids changing to half-normal saline for purposes of free water  Both sodium and chloride elevated secondary to normal saline infusion over the last couple of days  3   Diabetes  On insulin coverage  4   Hypertension  Controlled  5   Dementia  Ongoing    Plan:  See above    Subjective:   No subjective complaint  Patient unable to give any account of her history of present illness etc secondary to her dementia  Objective:     Vitals: Blood pressure 146/60, pulse 74, temperature 98 2 °F (36 8 °C), temperature source Tympanic, resp  rate 18, height 5' 2" (1 575 m), weight 55 kg (121 lb 3 2 oz), SpO2 96 %  ,Body mass index is 22 17 kg/m²  Intake/Output Summary (Last 24 hours) at 12/08/18 1724  Last data filed at 12/08/18 1300   Gross per 24 hour   Intake              900 ml   Output                0 ml   Net              900 ml       Physical Exam: /60 (BP Location: Left arm)   Pulse 74   Temp 98 2 °F (36 8 °C) (Tympanic)   Resp 18   Ht 5' 2" (1 575 m)   Wt 55 kg (121 lb 3 2 oz)   SpO2 96%   BMI 22 17 kg/m²     General Appearance:    Alert, cooperative, no distress, appears stated age  Demented   Head:    Normocephalic, without obvious abnormality, atraumatic   Eyes:    PERRL, conjunctiva/corneas clear, EOM's intact,   Ears:     Nose:    Throat:    Neck:   Supple, symmetrical, trachea midline, no adenopathy;     thyroid:  no enlargement/tenderness/nodules; no carotid    bruit or JVD   Back:     Symmetric, no curvature, ROM normal, no CVA tenderness   Lungs:     Clear to auscultation bilaterally, respirations unlabored   Chest Wall:    No tenderness or deformity    Heart:    Regular rate and rhythm,  harsh murmur unchanged    No rub or gallop   Breast Exam:       Abdomen:     Soft, non-tender, bowel sounds active all four quadrants,     no masses, no organomegaly   Genitalia:     Rectal:     Extremities:   Extremities normal, atraumatic, no cyanosis or edema   Pulses:   2+ and symmetric all extremities   Skin:   Skin color, texture, turgor normal, no rashes or lesions   Lymph nodes:   Cervical, supraclavicular, and axillary nodes normal   Neurologic:         Invasive Devices     Peripheral Intravenous Line            Peripheral IV 12/07/18 Right Forearm 1 day                Lab, Imaging and other studies: I have personally reviewed pertinent reports      VTE Pharmacologic Prophylaxis: Heparin  VTE M no

## 2024-01-08 NOTE — PLAN OF CARE
Cystoscopy    Date/Time: 1/8/2024 11:20 AM    Performed by: Ifrah Falk MD  Authorized by: Ifrah Falk MD    Consent Done?:  Yes (Written)  Timeout: prior to procedure the correct patient, procedure, and site was verified    Prep: patient was prepped and draped in usual sterile fashion    Anesthesia:  Lidocaine jelly  Indications: history bladder cancer    Position:  Dorsal lithotomy  Anesthesia:  Lidocaine jelly  Patient sedated?: No    Preparation: Patient was prepped and draped in usual sterile fashion    Scope type:  Flexible cystoscope  Stent inserted: No    Stent removed: No    External exam normal: Yes    Digital exam performed: No    Urethra normal: Yes    Bladder neck normal: Yes    Bladder normal: Yes (Similar area of erythema in R lateral bladder wall - improved from previous check.)     patient tolerated the procedure well with no immediate complications  Comments:        Repeat cystoscopy in 1 year.      Problem: OCCUPATIONAL THERAPY ADULT  Goal: Performs self-care activities at highest level of function for planned discharge setting  See evaluation for individualized goals  Treatment Interventions: ADL retraining, Functional transfer training, UE strengthening/ROM, Endurance training, Patient/family training, Activityengagement          See flowsheet documentation for full assessment, interventions and recommendations  Outcome: Progressing  Limitation: Decreased ADL status, Decreased UE strength, Decreased cognition, Decreased endurance, Decreased Safe judgement during ADL, Decreased self-care trans  Prognosis: Fair  Assessment: Pt is a 80 y o  female seen for OT evaluation s/p admit to [address] on 12/7/2018 w/ Acute kidney injury ([facility] [address] )  Comorbidities affecting pt's functional performance at time of assessment include: limited hearing and limited cognition  Personal factors affecting pt at time of IE include:difficulty performing ADLS  Prior to admission, pt was requiring min assistance with self care ADL's and mobility with RW (D for IADL's)  Upon evaluation: Pt requires max to D with ADL's, Mod to max A with mobility and transfers r/t the following deficits impacting occupational performance: weakness, decreased strength, decreased balance, decreased tolerance and decreased safety awareness  Pt to benefit from continued skilled OT tx while in the hospital to address deficits as defined above and maximize level of functional independence w ADL's and functional mobility  Occupational Performance areas to address include: eating, grooming, bathing/shower, dressing and functional mobility  From OT standpoint, recommendation at time of d/c would be STR vs [facility] with services at facility         OT Discharge Recommendation: Short Term Rehab (vs return to [facility] with in facility therapies)     Ashley Clancy OT